# Patient Record
Sex: FEMALE | Race: WHITE | Employment: OTHER | URBAN - METROPOLITAN AREA
[De-identification: names, ages, dates, MRNs, and addresses within clinical notes are randomized per-mention and may not be internally consistent; named-entity substitution may affect disease eponyms.]

---

## 2017-01-03 ENCOUNTER — OFFICE VISIT (OUTPATIENT)
Dept: SURGERY | Facility: CLINIC | Age: 77
End: 2017-01-03

## 2017-01-03 VITALS — TEMPERATURE: 99 F

## 2017-01-03 DIAGNOSIS — K64.2 PROLAPSED INTERNAL HEMORRHOIDS, GRADE 3: Primary | ICD-10-CM

## 2017-01-03 DIAGNOSIS — N81.6 RECTOCELE: ICD-10-CM

## 2017-01-03 PROCEDURE — 46221 LIGATION OF HEMORRHOID(S): CPT | Performed by: COLON & RECTAL SURGERY

## 2017-01-03 NOTE — PROGRESS NOTES
Follow Up Visit Note       Active Problems      1. Prolapsed internal hemorrhoids, grade 3    2.  Rectocele          Chief Complaint   Hemorrhoid Banding    History of Present Illness        Allergies  Minerva is allergic to allergy; contrast dye; doxycyclin history and social history have been reviewed by me today.     Family History   Problem Relation Age of Onset   • Heart Attack Sister      AMI   • Other [Other] [OTHER] Mother      atherosclerosis   • Cancer Father      bladder   • Cancer Brother      bladd Psychiatric/Behavioral: Negative.          Physical Findings   Temp(Src) 99.1 °F (37.3 °C)  LMP 01/01/1992  Physical Exam  Assessment    Assessment   Prolapsed internal hemorrhoids, grade 3  (primary encounter diagnosis)  Rectocele    Plan   At today's of

## 2017-01-03 NOTE — PATIENT INSTRUCTIONS
At today's office visit we treated a left lateral internal hemorrhoid. At today's visit, the patient underwent an uncomplicated application of a rubber band and injection of a 5% phenol solution into the base of the rubberbanded hemorrhoid.     The patie

## 2017-01-03 NOTE — PROCEDURES
Pre op diagnosis: Internal Hemorrhoids    Post op diagnosis: Same    Procedure: Anoscopy with O-ring Rubber Band Ligation of Internal Hemorrhoids    Surgeon: Warrick Dancer, MD    History of present illness:    This patient has internal hemorrhoids that are s

## 2017-01-05 ENCOUNTER — HOSPITAL ENCOUNTER (OUTPATIENT)
Dept: MAMMOGRAPHY | Facility: HOSPITAL | Age: 77
Discharge: HOME | End: 2017-01-05
Attending: FAMILY MEDICINE
Payer: MEDICARE

## 2017-01-05 DIAGNOSIS — Z12.31 ENCOUNTER FOR SCREENING MAMMOGRAM FOR BREAST CANCER: ICD-10-CM

## 2017-01-05 PROCEDURE — 77067 SCR MAMMO BI INCL CAD: CPT

## 2017-01-23 ENCOUNTER — OFFICE VISIT (OUTPATIENT)
Dept: SURGERY | Facility: CLINIC | Age: 77
End: 2017-01-23

## 2017-01-23 VITALS
BODY MASS INDEX: 23.55 KG/M2 | SYSTOLIC BLOOD PRESSURE: 164 MMHG | TEMPERATURE: 98 F | HEIGHT: 62 IN | DIASTOLIC BLOOD PRESSURE: 82 MMHG | HEART RATE: 75 BPM | WEIGHT: 128 LBS | RESPIRATION RATE: 16 BRPM

## 2017-01-23 DIAGNOSIS — K64.2 PROLAPSED INTERNAL HEMORRHOIDS, GRADE 3: Primary | ICD-10-CM

## 2017-01-23 PROCEDURE — 46221 LIGATION OF HEMORRHOID(S): CPT | Performed by: COLON & RECTAL SURGERY

## 2017-01-23 NOTE — PROGRESS NOTES
Follow Up Visit Note       Active Problems      1.  Prolapsed internal hemorrhoids, grade 3          Chief Complaint   Hemorrhoid Banding    History of Present Illness        Allergies  Minerva is allergic to allergy; contrast dye; doxycycline; lansoprazole; social history have been reviewed by me today.     Family History   Problem Relation Age of Onset   • Heart Attack Sister      AMI   • Other [Other] [OTHER] Mother      atherosclerosis   • Cancer Father      bladder   • Cancer Brother      bladder   • Heart swallowing. Respiratory: Negative for apnea, cough, shortness of breath and wheezing. Cardiovascular: Negative for chest pain, palpitations and leg swelling.    Gastrointestinal: Negative for nausea, vomiting, abdominal pain, diarrhea, constipation, b

## 2017-01-24 NOTE — PATIENT INSTRUCTIONS
At today's office visit we treated a right posterior lateral internal hemorrhoid. At today's visit, the patient underwent an uncomplicated application of a rubber band and injection of a 5% phenol solution into the base of the rubberbanded hemorrhoid.

## 2017-01-24 NOTE — PROCEDURES
Pre op diagnosis: Internal Hemorrhoids    Post op diagnosis: Same    Procedure: Anoscopy with O-ring Rubber Band Ligation of Internal Hemorrhoids    Surgeon: Ml Pozo MD    History of present illness:    This patient has internal hemorrhoids that are s

## 2017-02-21 ENCOUNTER — OFFICE VISIT (OUTPATIENT)
Dept: SURGERY | Facility: CLINIC | Age: 77
End: 2017-02-21

## 2017-02-21 VITALS
HEART RATE: 87 BPM | SYSTOLIC BLOOD PRESSURE: 124 MMHG | DIASTOLIC BLOOD PRESSURE: 84 MMHG | HEIGHT: 62 IN | WEIGHT: 128 LBS | TEMPERATURE: 98 F | BODY MASS INDEX: 23.55 KG/M2

## 2017-02-21 DIAGNOSIS — K64.2 PROLAPSED INTERNAL HEMORRHOIDS, GRADE 3: Primary | ICD-10-CM

## 2017-02-21 PROCEDURE — 46221 LIGATION OF HEMORRHOID(S): CPT | Performed by: COLON & RECTAL SURGERY

## 2017-02-21 RX ORDER — CLINDAMYCIN HYDROCHLORIDE 150 MG/1
150 CAPSULE ORAL ONCE AS NEEDED
Refills: 0 | COMMUNITY
Start: 2016-12-20 | End: 2017-09-07 | Stop reason: ALTCHOICE

## 2017-02-21 NOTE — PROGRESS NOTES
Follow Up Visit Note       Active Problems      1. Prolapsed internal hemorrhoids, grade 3          Chief Complaint   Patient presents with:  Hemorrhoid Banding: pt here for possible 4th hemorrhoid banding tx.  Some soreness when having a BM, some bleeding adenoidectomy    THYROIDECTOMY      Comment partial    GASTRODUODENOSTOMY  11/22/2013    Comment +hiatal hernia    JACKIE NEEDLE LOCALIZATION W/ SPECIMEN 1 SITE LEFT  1985    JACKIE NEEDLE LOCALIZATION W/ SPECIMEN 1 SITE LEFT  1990       The family history and s has been reviewed by me during today. Review of Systems   Constitutional: Negative for fever, chills, diaphoresis, fatigue and unexpected weight change. HENT: Negative for hearing loss, nosebleeds, sore throat and trouble swallowing.     Respiratory: Neg her hemorrhoidal tissues. I have no further follow-up scheduled with this patient at this time. This patient can see me on an as-needed basis. This patient should return urgently for any problems or complications related to my surgical intervention.

## 2017-02-21 NOTE — PROCEDURES
Pre op diagnosis: Internal Hemorrhoids    Post op diagnosis: Same    Procedure: Anoscopy with O-ring Rubber Band Ligation of Internal Hemorrhoids    Surgeon: Dave Echols MD    History of present illness:    This patient has internal hemorrhoids that are s

## 2017-05-11 RX ORDER — FAMOTIDINE 40 MG/1
TABLET, FILM COATED ORAL
Qty: 60 TABLET | Refills: 0 | Status: SHIPPED | OUTPATIENT
Start: 2017-05-11 | End: 2017-07-16

## 2017-05-15 ENCOUNTER — PRIOR ORIGINAL RECORDS (OUTPATIENT)
Dept: OTHER | Age: 77
End: 2017-05-15

## 2017-05-15 ENCOUNTER — LAB ENCOUNTER (OUTPATIENT)
Dept: LAB | Facility: HOSPITAL | Age: 77
End: 2017-05-15
Attending: OTOLARYNGOLOGY
Payer: MEDICARE

## 2017-05-15 DIAGNOSIS — E04.1 NONTOXIC SINGLE THYROID NODULE: Primary | ICD-10-CM

## 2017-05-15 PROCEDURE — 84443 ASSAY THYROID STIM HORMONE: CPT

## 2017-05-15 PROCEDURE — 36415 COLL VENOUS BLD VENIPUNCTURE: CPT

## 2017-05-15 PROCEDURE — 82607 VITAMIN B-12: CPT

## 2017-05-15 PROCEDURE — 84439 ASSAY OF FREE THYROXINE: CPT

## 2017-05-16 ENCOUNTER — LAB ENCOUNTER (OUTPATIENT)
Dept: LAB | Facility: HOSPITAL | Age: 77
End: 2017-05-16
Attending: INTERNAL MEDICINE
Payer: MEDICARE

## 2017-05-16 ENCOUNTER — OFFICE VISIT (OUTPATIENT)
Dept: FAMILY MEDICINE CLINIC | Facility: CLINIC | Age: 77
End: 2017-05-16

## 2017-05-16 VITALS
BODY MASS INDEX: 24.11 KG/M2 | HEIGHT: 62 IN | SYSTOLIC BLOOD PRESSURE: 130 MMHG | WEIGHT: 131 LBS | DIASTOLIC BLOOD PRESSURE: 60 MMHG | RESPIRATION RATE: 14 BRPM | HEART RATE: 78 BPM

## 2017-05-16 DIAGNOSIS — K64.2 PROLAPSED INTERNAL HEMORRHOIDS, GRADE 3: ICD-10-CM

## 2017-05-16 DIAGNOSIS — J30.9 ALLERGIC RHINITIS, UNSPECIFIED ALLERGIC RHINITIS TRIGGER, UNSPECIFIED RHINITIS SEASONALITY: ICD-10-CM

## 2017-05-16 DIAGNOSIS — Z00.00 ENCOUNTER FOR ANNUAL HEALTH EXAMINATION: Primary | ICD-10-CM

## 2017-05-16 DIAGNOSIS — E78.00 PURE HYPERCHOLESTEROLEMIA: ICD-10-CM

## 2017-05-16 DIAGNOSIS — K21.9 GASTROESOPHAGEAL REFLUX DISEASE, ESOPHAGITIS PRESENCE NOT SPECIFIED: ICD-10-CM

## 2017-05-16 DIAGNOSIS — E04.1 NONTOXIC SINGLE THYROID NODULE: Primary | ICD-10-CM

## 2017-05-16 DIAGNOSIS — K57.30 DIVERTICULOSIS OF LARGE INTESTINE WITHOUT HEMORRHAGE: ICD-10-CM

## 2017-05-16 DIAGNOSIS — N81.6 RECTOCELE: ICD-10-CM

## 2017-05-16 DIAGNOSIS — Z12.11 SCREENING FOR COLON CANCER: ICD-10-CM

## 2017-05-16 DIAGNOSIS — E78.5 DYSLIPIDEMIA: ICD-10-CM

## 2017-05-16 DIAGNOSIS — E03.9 ACQUIRED HYPOTHYROIDISM: ICD-10-CM

## 2017-05-16 DIAGNOSIS — E55.9 VITAMIN D DEFICIENCY: ICD-10-CM

## 2017-05-16 DIAGNOSIS — G47.33 OSA (OBSTRUCTIVE SLEEP APNEA): ICD-10-CM

## 2017-05-16 DIAGNOSIS — H26.9 CATARACT OF BOTH EYES, UNSPECIFIED CATARACT TYPE: ICD-10-CM

## 2017-05-16 DIAGNOSIS — Z78.0 MENOPAUSE: ICD-10-CM

## 2017-05-16 DIAGNOSIS — R31.9 HEMATURIA, UNSPECIFIED: ICD-10-CM

## 2017-05-16 DIAGNOSIS — I10 ESSENTIAL HYPERTENSION: ICD-10-CM

## 2017-05-16 DIAGNOSIS — L30.9 ECZEMA, UNSPECIFIED TYPE: ICD-10-CM

## 2017-05-16 DIAGNOSIS — K44.9 HIATAL HERNIA: ICD-10-CM

## 2017-05-16 DIAGNOSIS — Z13.31 DEPRESSION SCREENING: ICD-10-CM

## 2017-05-16 DIAGNOSIS — F40.01 AGORAPHOBIA WITH PANIC DISORDER: ICD-10-CM

## 2017-05-16 DIAGNOSIS — M81.6 LOCALIZED OSTEOPOROSIS, UNSPECIFIED PATHOLOGICAL FRACTURE PRESENCE: ICD-10-CM

## 2017-05-16 PROCEDURE — G0439 PPPS, SUBSEQ VISIT: HCPCS | Performed by: FAMILY MEDICINE

## 2017-05-16 PROCEDURE — G0444 DEPRESSION SCREEN ANNUAL: HCPCS | Performed by: FAMILY MEDICINE

## 2017-05-16 RX ORDER — DIPHENHYDRAMINE HYDROCHLORIDE 25 MG/1
TABLET ORAL DAILY
COMMUNITY
End: 2019-05-07 | Stop reason: ALTCHOICE

## 2017-05-16 RX ORDER — AZELASTINE HCL 205.5 UG/1
SPRAY NASAL
COMMUNITY
End: 2018-02-05

## 2017-05-16 NOTE — PROGRESS NOTES
HPI:   Emilio Carrizales is a 78year old female who presents for a Medicare Subsequent Annual Wellness visit (Pt already had Initial Annual Wellness). Pt. Complains of fatigue and falling asleep at the wheel. Hx of MINGO.     Her last annual assessment h 5/16/17 encounter (Office Visit) with Elaina Gomez DO:  Azelastine HCl 0.15 % Nasal Solution by Nasal route. Biotin (BIOTIN 5000) 5 MG Oral Cap Take by mouth.    FAMOTIDINE 40 MG Oral Tab TAKE ONE TABLET BY MOUTH TWO TIMES A DAY   Multiple Vitamins-Mine SYSTEMS:   GENERAL: feels well otherwise  SKIN: denies any unusual skin lesions  EYES: denies blurred vision or double vision  HEENT: denies nasal congestion, sinus pain or ST  LUNGS: denies shortness of breath with exertion  CARDIOVASCULAR: denies chest p SUMMARY:   Diagnoses and all orders for this visit:    Encounter for annual health examination    Essential hypertension  -     Comp Metabolic Panel (14) [E]; Future    Dyslipidemia  -     Lipid Panel [E];  Future    Localized osteoporosis, unspecified path a good energy level?: Appropriate Exercise;Daily Walks    How would you describe your daily physical activity?: Moderate    How would you describe your current health state?: Good    How do you maintain positive mental well-being?: Social Interaction; Visit No     Please go to \"Cognitive Assessment\" under Medicare Assessment section in Charting, test patient and document. Then, refresh your progress note to see your input here.   Cognitive Assessment     What day of the week is this?: Correct    What nereida for: CHLAMYDIA No flowsheet data found.     Screening Mammogram      Mammogram Annually to 78, then as discussed Mammogram,1 Yr due on 01/05/2018 Update Health Maintenance if applicable     Immunizations (Update Immunization Activity if applicable)     Infl Creat/alb ratio  Annually      LDL  Annually LDL CHOLESTEROL (mg/dL)   Date Value   12/02/2016 88     LDL CHOLESTROL (mg/dL)   Date Value   09/06/2012 142*    No flowsheet data found. Dilated Eye exam  Annually No flowsheet data found.   No flowsheet da

## 2017-05-16 NOTE — PATIENT INSTRUCTIONS
Minerva Hameed's SCREENING SCHEDULE   Tests on this list are recommended by your physician but may not be covered, or covered at this frequency, by your insurer. Please check with your insurance carrier before scheduling to verify coverage.    PREVENTATI lifetime   • Anyone with a family history    Colorectal Cancer Screening  Covered up to Age 76     Colonoscopy Screen   Covered every 10 years- more often if abnormal Colonoscopy,10 Years due on 05/02/2017 Update Health Maintenance if applicable    Flex Si or performed in visit on 10/27/15  -FLU VACC [address] ANTIG   Orders placed or performed in visit on 10/24/14  -INFLUENZA VIRUS VACCINE, >=1YEARS OF AGE   Orders placed or performed in visit on 10/11/13  -INFLUENZA VIRUS VACCINE, PRESERV FREE, >=3 YE Directives. It also has the State forms available on it's website for anyone to review and print using their home computer and printer. (the forms are also available in [address])  [email or web address]. [email or web address] org  This link also has information from the [employer]

## 2017-05-25 ENCOUNTER — APPOINTMENT (OUTPATIENT)
Dept: LAB | Facility: HOSPITAL | Age: 77
End: 2017-05-25
Attending: FAMILY MEDICINE
Payer: MEDICARE

## 2017-05-25 ENCOUNTER — PRIOR ORIGINAL RECORDS (OUTPATIENT)
Dept: OTHER | Age: 77
End: 2017-05-25

## 2017-05-25 DIAGNOSIS — I10 ESSENTIAL HYPERTENSION: ICD-10-CM

## 2017-05-25 DIAGNOSIS — E78.5 DYSLIPIDEMIA: ICD-10-CM

## 2017-05-25 PROCEDURE — 80053 COMPREHEN METABOLIC PANEL: CPT

## 2017-05-25 PROCEDURE — 80061 LIPID PANEL: CPT

## 2017-05-25 PROCEDURE — 36415 COLL VENOUS BLD VENIPUNCTURE: CPT

## 2017-05-30 ENCOUNTER — OFFICE VISIT (OUTPATIENT)
Dept: FAMILY MEDICINE CLINIC | Facility: CLINIC | Age: 77
End: 2017-05-30

## 2017-05-30 ENCOUNTER — LAB ENCOUNTER (OUTPATIENT)
Dept: LAB | Age: 77
End: 2017-05-30
Attending: FAMILY MEDICINE
Payer: MEDICARE

## 2017-05-30 VITALS
HEART RATE: 88 BPM | HEIGHT: 62 IN | WEIGHT: 129 LBS | DIASTOLIC BLOOD PRESSURE: 60 MMHG | BODY MASS INDEX: 23.74 KG/M2 | TEMPERATURE: 103 F | SYSTOLIC BLOOD PRESSURE: 120 MMHG | RESPIRATION RATE: 14 BRPM

## 2017-05-30 DIAGNOSIS — R50.9 FEVER, UNSPECIFIED FEVER CAUSE: ICD-10-CM

## 2017-05-30 DIAGNOSIS — N12 PYELONEPHRITIS: Primary | ICD-10-CM

## 2017-05-30 DIAGNOSIS — N12 PYELONEPHRITIS: ICD-10-CM

## 2017-05-30 PROCEDURE — 87086 URINE CULTURE/COLONY COUNT: CPT

## 2017-05-30 PROCEDURE — 80053 COMPREHEN METABOLIC PANEL: CPT

## 2017-05-30 PROCEDURE — 87186 SC STD MICRODIL/AGAR DIL: CPT

## 2017-05-30 PROCEDURE — 87088 URINE BACTERIA CULTURE: CPT

## 2017-05-30 PROCEDURE — 99214 OFFICE O/P EST MOD 30 MIN: CPT | Performed by: FAMILY MEDICINE

## 2017-05-30 PROCEDURE — 81003 URINALYSIS AUTO W/O SCOPE: CPT | Performed by: FAMILY MEDICINE

## 2017-05-30 PROCEDURE — 85025 COMPLETE CBC W/AUTO DIFF WBC: CPT

## 2017-05-30 PROCEDURE — 36415 COLL VENOUS BLD VENIPUNCTURE: CPT

## 2017-05-30 RX ORDER — CIPROFLOXACIN 500 MG/1
500 TABLET, FILM COATED ORAL 2 TIMES DAILY
Qty: 20 TABLET | Refills: 0 | Status: SHIPPED | OUTPATIENT
Start: 2017-05-30 | End: 2017-06-12 | Stop reason: ALTCHOICE

## 2017-05-30 NOTE — PROGRESS NOTES
Patrick Ocampo is a 78year old female. HPI:   Patient states she has not been feeling well for the past 2-3 weeks. She initially noticed that when she would go to the bathroom she would notice a pressure down below.   She just checked it up to discomfor Face Flushing  Pcn [Penicillins]       Rash  Peanuts                     Comment:vomiting  Pravastatin             Myalgia  Simvastatin             Myalgia  Sulfa Antibiotics       Rash  Sulfa Drugs Cross R*    Rash  Valium                      Comment: Ta breath with exertion  CARDIOVASCULAR: denies chest pain on exertion  GI: denies abdominal pain,denies heartburn  : Discomfort while urinating and urge to urinate without any urine coming out  MUSCULOSKELETAL: denies back pain  EXTREMITIES:  No pain or nu

## 2017-06-01 ENCOUNTER — TELEPHONE (OUTPATIENT)
Dept: FAMILY MEDICINE CLINIC | Facility: CLINIC | Age: 77
End: 2017-06-01

## 2017-06-01 DIAGNOSIS — N10 ACUTE PYELONEPHRITIS: Primary | ICD-10-CM

## 2017-06-12 PROBLEM — G47.30 SLEEP-DISORDERED BREATHING: Status: ACTIVE | Noted: 2017-06-12

## 2017-06-13 ENCOUNTER — APPOINTMENT (OUTPATIENT)
Dept: LAB | Facility: HOSPITAL | Age: 77
End: 2017-06-13
Attending: FAMILY MEDICINE
Payer: MEDICARE

## 2017-06-13 DIAGNOSIS — N10 ACUTE PYELONEPHRITIS: ICD-10-CM

## 2017-06-13 PROCEDURE — 87086 URINE CULTURE/COLONY COUNT: CPT

## 2017-06-14 ENCOUNTER — TELEPHONE (OUTPATIENT)
Dept: FAMILY MEDICINE CLINIC | Facility: CLINIC | Age: 77
End: 2017-06-14

## 2017-06-14 NOTE — TELEPHONE ENCOUNTER
Call to pt reaches voice mail. Left vmm advising of dr Saul Noonan recommendation. Informed as soon as culture results received we will call back with dr's further recommendations.

## 2017-06-14 NOTE — TELEPHONE ENCOUNTER
Pt was seen on 5/30/17 for UTI. States she just finished Cipro on 6/9/17. States on Tuesday 6/13/17 she began \"burning and lower left back pain\". Said she had a urinalysis performed at THE [facility] ER on 6/13/17. Did not say results.   Is asking for Nephrolo

## 2017-06-14 NOTE — TELEPHONE ENCOUNTER
Per chart her cx is still pending, has had her sx's intermittent past few weeks. She is asking for nephrology referral.    Should we wait until final cx done? Any other recommendation? Imaging? Please advise. Thanks.

## 2017-06-16 DIAGNOSIS — M54.5 LEFT LOW BACK PAIN, UNSPECIFIED CHRONICITY, WITH SCIATICA PRESENCE UNSPECIFIED: ICD-10-CM

## 2017-06-16 DIAGNOSIS — R30.0 BURNING WITH URINATION: ICD-10-CM

## 2017-06-16 DIAGNOSIS — R31.9 HEMATURIA, UNSPECIFIED: Primary | ICD-10-CM

## 2017-06-30 ENCOUNTER — TELEPHONE (OUTPATIENT)
Dept: FAMILY MEDICINE CLINIC | Facility: CLINIC | Age: 77
End: 2017-06-30

## 2017-06-30 NOTE — TELEPHONE ENCOUNTER
Pt had a temp of 96 one day last week and today. Pt is a little concerned b/c she has kidney disease and is wondering if this is a side effect of the med she is taking for her kidney.

## 2017-07-03 NOTE — TELEPHONE ENCOUNTER
T.C. To pt. She does not have Kidney Disease. She had a UTI. Pt is feeling fine and her temperature has now been 97.0. I advised her to please make sure she calls back if she has any re-occurence of symptoms. Pt verbalized understanding.

## 2017-07-17 RX ORDER — FAMOTIDINE 40 MG/1
TABLET, FILM COATED ORAL
Qty: 60 TABLET | Refills: 0 | Status: SHIPPED | OUTPATIENT
Start: 2017-07-17 | End: 2017-09-15

## 2017-07-26 ENCOUNTER — OFFICE VISIT (OUTPATIENT)
Dept: FAMILY MEDICINE CLINIC | Facility: CLINIC | Age: 77
End: 2017-07-26

## 2017-07-26 VITALS
WEIGHT: 129 LBS | OXYGEN SATURATION: 98 % | BODY MASS INDEX: 23.74 KG/M2 | RESPIRATION RATE: 14 BRPM | HEIGHT: 62 IN | TEMPERATURE: 99 F | DIASTOLIC BLOOD PRESSURE: 62 MMHG | HEART RATE: 80 BPM | SYSTOLIC BLOOD PRESSURE: 120 MMHG

## 2017-07-26 DIAGNOSIS — M79.89 TOE SWELLING: ICD-10-CM

## 2017-07-26 DIAGNOSIS — T14.8XXA BLISTER: ICD-10-CM

## 2017-07-26 DIAGNOSIS — T63.441A BEE STING, ACCIDENTAL OR UNINTENTIONAL, INITIAL ENCOUNTER: Primary | ICD-10-CM

## 2017-07-26 PROCEDURE — 99214 OFFICE O/P EST MOD 30 MIN: CPT | Performed by: FAMILY MEDICINE

## 2017-07-26 PROCEDURE — 81015 MICROSCOPIC EXAM OF URINE: CPT | Performed by: UROLOGY

## 2017-07-26 NOTE — PROGRESS NOTES
Charles Villatoro is a 78year old female. HPI:   Patient states that last Wednesday she got stung on the lateral aspect of her right great toe. She was also wearing a shoe that is probably irritating the area.   She had noted over time that a blister forme Dysfunction of eustachian tube    • Dyspepsia and other specified disorders of function of stomach    • Dysphagia, unspecified(787.20)    • Heartburn    • MVP (mitral valve prolapse)    • Obstructive sleep apnea (adult) (pediatric)    • Personal history of encounter diagnosis)  Blister  Toe swelling    No orders of the defined types were placed in this encounter. Meds & Refills for this Visit:  No prescriptions requested or ordered in this encounter    Imaging & Consults:  None   Monitor.   Neosporin  No

## 2017-08-08 ENCOUNTER — OFFICE VISIT (OUTPATIENT)
Dept: FAMILY MEDICINE CLINIC | Facility: CLINIC | Age: 77
End: 2017-08-08

## 2017-08-08 VITALS
BODY MASS INDEX: 24 KG/M2 | SYSTOLIC BLOOD PRESSURE: 138 MMHG | RESPIRATION RATE: 14 BRPM | DIASTOLIC BLOOD PRESSURE: 70 MMHG | WEIGHT: 130 LBS | HEART RATE: 68 BPM | TEMPERATURE: 98 F

## 2017-08-08 DIAGNOSIS — R10.2 PELVIC PRESSURE IN FEMALE: ICD-10-CM

## 2017-08-08 DIAGNOSIS — L30.9 DERMATITIS: Primary | ICD-10-CM

## 2017-08-08 LAB
BILIRUB UR QL STRIP.AUTO: NEGATIVE
CLARITY UR REFRACT.AUTO: CLEAR
COLOR UR AUTO: YELLOW
GLUCOSE UR STRIP.AUTO-MCNC: NEGATIVE MG/DL
KETONES UR STRIP.AUTO-MCNC: NEGATIVE MG/DL
LEUKOCYTE ESTERASE UR QL STRIP.AUTO: NEGATIVE
NITRITE UR QL STRIP.AUTO: NEGATIVE
PH UR STRIP.AUTO: 5 [PH] (ref 4.5–8)
PROT UR STRIP.AUTO-MCNC: NEGATIVE MG/DL
RBC UR QL AUTO: NEGATIVE
SP GR UR STRIP.AUTO: 1.01 (ref 1–1.03)
UROBILINOGEN UR STRIP.AUTO-MCNC: <2 MG/DL

## 2017-08-08 PROCEDURE — 81003 URINALYSIS AUTO W/O SCOPE: CPT | Performed by: FAMILY MEDICINE

## 2017-08-08 PROCEDURE — 99213 OFFICE O/P EST LOW 20 MIN: CPT | Performed by: FAMILY MEDICINE

## 2017-08-08 NOTE — PROGRESS NOTES
Murali Saini is a 78year old female. HPI:   Patient states she had noticed her bee sting bite about 3 weeks ago. She used some Benadryl gel on her leg for the bee sting was. And she decided to take some old Benadryl from 2013.   She took it the day o Niacin                  Face Flushing  Pcn [Penicillins]       Rash  Peanuts                     Comment:vomiting  Pravastatin             Myalgia  Simvastatin             Myalgia  Sulfa Antibiotics       Rash  Sulfa Drugs Cross R*    Rash  Moncho Cells None Seen Small /LPF   Transitional Cells None Seen Small /LPF   Yeast Urine None Seen None Seen   Mucous Urine 1+ (A) None Seen       REVIEW OF SYSTEMS:   GENERAL: feels well otherwise  SKIN: denies any unusual skin lesions  LUNGS: denies shortness

## 2017-08-17 ENCOUNTER — PRIOR ORIGINAL RECORDS (OUTPATIENT)
Dept: OTHER | Age: 77
End: 2017-08-17

## 2017-08-17 LAB
CHOLESTEROL, TOTAL: 180 MG/DL
FREE T4: 1 MG/DL
HDL CHOLESTEROL: 61 MG/DL
LDL CHOLESTEROL: 91 MG/DL
NON-HDL CHOLESTEROL: 119 MG/DL
THYROID STIMULATING HORMONE: 2.84 MLU/L
TRIGLYCERIDES: 139 MG/DL

## 2017-09-05 ENCOUNTER — PRIOR ORIGINAL RECORDS (OUTPATIENT)
Dept: OTHER | Age: 77
End: 2017-09-05

## 2017-09-07 ENCOUNTER — HOSPITAL ENCOUNTER (OUTPATIENT)
Age: 77
Discharge: HOME | End: 2017-09-07
Attending: FAMILY MEDICINE
Payer: MEDICARE

## 2017-09-07 ENCOUNTER — TELEPHONE (OUTPATIENT)
Dept: FAMILY MEDICINE CLINIC | Facility: CLINIC | Age: 77
End: 2017-09-07

## 2017-09-07 VITALS
HEART RATE: 73 BPM | OXYGEN SATURATION: 100 % | DIASTOLIC BLOOD PRESSURE: 88 MMHG | SYSTOLIC BLOOD PRESSURE: 161 MMHG | RESPIRATION RATE: 18 BRPM | BODY MASS INDEX: 24 KG/M2 | TEMPERATURE: 98 F | WEIGHT: 130.06 LBS

## 2017-09-07 DIAGNOSIS — R30.0 DYSURIA: Primary | ICD-10-CM

## 2017-09-07 LAB
POCT BILIRUBIN URINE: NEGATIVE
POCT GLUCOSE URINE: NEGATIVE MG/DL
POCT KETONE URINE: NEGATIVE MG/DL
POCT LEUKOCYTE ESTERASE URINE: NEGATIVE
POCT NITRITE URINE: NEGATIVE
POCT PH URINE: 6.5 (ref 5–8)
POCT PROTEIN URINE: NEGATIVE MG/DL
POCT SPECIFIC GRAVITY URINE: 1.02
POCT URINE CLARITY: CLEAR
POCT URINE COLOR: YELLOW
POCT UROBILINOGEN URINE: 0.2 MG/DL

## 2017-09-07 PROCEDURE — 99212 OFFICE O/P EST SF 10 MIN: CPT

## 2017-09-07 PROCEDURE — 99202 OFFICE O/P NEW SF 15 MIN: CPT

## 2017-09-07 PROCEDURE — 81002 URINALYSIS NONAUTO W/O SCOPE: CPT | Performed by: FAMILY MEDICINE

## 2017-09-07 NOTE — ED PROVIDER NOTES
Patient Seen in: [address]    History   Patient presents with:  Urinary Symptoms (urologic)    Stated Complaint: possible UTI    The history is provided by the patient. Patient is a 30-year-old female.   Coming in [facility] TONSILLECTOMY      Comment: w/ adenoidectomy  3/29/2010: UPPER GI ENDOSCOPY,EXAM      Comment: A. STOMACH BX:  GASTRIC ANTRAL MUCOSA, NO PATH               CHANGES   B.  ESOPHAGEAL BX  BENIGN STRATIFIED                SQUAMOUS MUCOSA, NO PATH CHANGES.   NO °C)  Temp src: Temporal  SpO2: 100 %  O2 Device: None (Room air)    Current:BP (!) 161/88   Pulse 73   Temp 98.4 °F (36.9 °C) (Temporal)   Resp 18   Wt 59 kg   LMP 01/01/1992   SpO2 100%   BMI 23.79 kg/m²         Physical Exam   Constitutional: She is orie

## 2017-09-07 NOTE — TELEPHONE ENCOUNTER
Pt states she is having UTI symptoms:  Frequency, leaking before she reaches bathroom, left side towards back is sore occasionally. Symptoms for three days. Pt is asking if an order for a urine test can be placed.   She would like to go to [facility]

## 2017-09-07 NOTE — TELEPHONE ENCOUNTER
Pt has had hx of UTI. I have advised her to go to urgent care now. She is on her way home from a sleep study. I have asked her to stop at [facility]. pt agrees to do this. Just FYI

## 2017-09-13 ENCOUNTER — PRIOR ORIGINAL RECORDS (OUTPATIENT)
Dept: OTHER | Age: 77
End: 2017-09-13

## 2017-09-14 ENCOUNTER — OFFICE VISIT (OUTPATIENT)
Dept: SLEEP CENTER | Facility: HOSPITAL | Age: 77
End: 2017-09-14
Attending: INTERNAL MEDICINE
Payer: MEDICARE

## 2017-09-14 PROCEDURE — 95810 POLYSOM 6/> YRS 4/> PARAM: CPT

## 2017-09-18 RX ORDER — FAMOTIDINE 40 MG/1
TABLET, FILM COATED ORAL
Qty: 60 TABLET | Refills: 0 | Status: SHIPPED | OUTPATIENT
Start: 2017-09-18 | End: 2017-11-15

## 2017-09-22 NOTE — PROCEDURES
[address] [address],[facility]       Accredited by the [place] [employer] ([employer]ASM)    PATIENT'S NAME:        Mone Wood  ATTENDING PHYSICIAN:   Christine Pulido M.D.   REFERRING PHYSICIAN:   Christine Pulido M.D.  PA was 308 minutes for a sleep efficiency of 83.7% which is slightly decreased as compared to expected norms. Sleep onset latency was decreased at 5.4 minutes. Wake after sleep onset was slightly increased at 55.5 minutes.   Sleep was fragmented by arousals bedtime. The patient should avoid driving while sleepy.       Dictated By Anna Griffin M.D.  d: 09/22/2017 16:08:25  t: 09/22/2017 16:34:50  [facility] [id number]  AK/

## 2017-10-04 PROCEDURE — 88305 TISSUE EXAM BY PATHOLOGIST: CPT | Performed by: INTERNAL MEDICINE

## 2017-10-05 ENCOUNTER — PRIOR ORIGINAL RECORDS (OUTPATIENT)
Dept: OTHER | Age: 77
End: 2017-10-05

## 2017-10-19 ENCOUNTER — OFFICE VISIT (OUTPATIENT)
Dept: FAMILY MEDICINE CLINIC | Facility: CLINIC | Age: 77
End: 2017-10-19

## 2017-10-19 VITALS
SYSTOLIC BLOOD PRESSURE: 102 MMHG | RESPIRATION RATE: 14 BRPM | WEIGHT: 126 LBS | DIASTOLIC BLOOD PRESSURE: 60 MMHG | BODY MASS INDEX: 23 KG/M2 | HEART RATE: 80 BPM | TEMPERATURE: 98 F

## 2017-10-19 DIAGNOSIS — L03.114 CELLULITIS OF LEFT UPPER EXTREMITY: Primary | ICD-10-CM

## 2017-10-19 PROCEDURE — 99213 OFFICE O/P EST LOW 20 MIN: CPT | Performed by: FAMILY MEDICINE

## 2017-10-19 RX ORDER — METHYLPREDNISOLONE 4 MG/1
TABLET ORAL
Qty: 1 KIT | Refills: 0 | Status: SHIPPED | OUTPATIENT
Start: 2017-10-19 | End: 2017-11-16 | Stop reason: ALTCHOICE

## 2017-10-19 RX ORDER — AMOXICILLIN AND CLAVULANATE POTASSIUM 875; 125 MG/1; MG/1
1 TABLET, FILM COATED ORAL 2 TIMES DAILY
Qty: 20 TABLET | Refills: 0 | Status: SHIPPED | OUTPATIENT
Start: 2017-10-19 | End: 2017-10-29

## 2017-10-19 NOTE — PROGRESS NOTES
Marie Wolfe is a 78year old female. HPI:   Patient states she was working in the yard on Tuesday. She does not recall getting bitten but she did note to fainting-like marks on her left forearm after coming in from working in the garden.   Since then, Dysfunction of eustachian tube    • Dyspepsia and other specified disorders of function of stomach    • Dysphagia, unspecified(787.20)    • Heartburn    • MVP (mitral valve prolapse)    • Obstructive sleep apnea (adult) (pediatric) EDW PSG-9/14/17    AHI 4 one measures 0.2 cm, the other measures 0.3 cm in   greatest dimension. The specimen is submitted in toto in cassette A.   JW/tjf  [FORMATTING REMOVED]        REVIEW OF SYSTEMS:   GENERAL: feels well otherwise  SKIN: denies any unusual skin lesions; bite? stomach otherwise she has never had any issues with penicillins or amoxicillin in the past.  I did explain to her that usually a systemic reaction would occur from an allergy to medication not just a spot on the stomach.   She did state that Dr. Ronan Sweet had

## 2017-11-16 ENCOUNTER — OFFICE VISIT (OUTPATIENT)
Dept: FAMILY MEDICINE CLINIC | Facility: CLINIC | Age: 77
End: 2017-11-16

## 2017-11-16 VITALS
RESPIRATION RATE: 18 BRPM | BODY MASS INDEX: 24.29 KG/M2 | HEART RATE: 86 BPM | DIASTOLIC BLOOD PRESSURE: 78 MMHG | WEIGHT: 132 LBS | TEMPERATURE: 99 F | OXYGEN SATURATION: 99 % | HEIGHT: 62 IN | SYSTOLIC BLOOD PRESSURE: 118 MMHG

## 2017-11-16 DIAGNOSIS — R35.0 URINARY FREQUENCY: ICD-10-CM

## 2017-11-16 DIAGNOSIS — J31.0 RHINITIS, UNSPECIFIED CHRONICITY, UNSPECIFIED TYPE: Primary | ICD-10-CM

## 2017-11-16 PROCEDURE — 99213 OFFICE O/P EST LOW 20 MIN: CPT | Performed by: NURSE PRACTITIONER

## 2017-11-16 PROCEDURE — 87086 URINE CULTURE/COLONY COUNT: CPT | Performed by: NURSE PRACTITIONER

## 2017-11-16 PROCEDURE — 81003 URINALYSIS AUTO W/O SCOPE: CPT | Performed by: NURSE PRACTITIONER

## 2017-11-16 RX ORDER — FAMOTIDINE 40 MG/1
TABLET, FILM COATED ORAL
Qty: 60 TABLET | Refills: 0 | Status: SHIPPED | OUTPATIENT
Start: 2017-11-16 | End: 2018-01-22

## 2017-11-16 NOTE — PROGRESS NOTES
Radha Worthington is a 78year old female. HPI:   Patient presents today reporting nasal congestion, runny nose, sinus pressure and headaches x3 weeks.  She reports that her symptoms \"come and go\" and are not present every day or all day long when they are Social History:  Smoking status: Never Smoker                                                              Smokeless tobacco: Never Used                      Alcohol use: No                 REVIEW OF SYSTEMS:   GENERAL HEALTH: feels well otherwise.  Vijay Lorenz provider specified. 1. Rhinitis, unspecified chronicity, unspecified type  Discussed OTC Claritin or Allegra. Resume using Azelastine nasal spray. Monitor. 2. Urinary frequency  UA showed trace leukocytes. Negative Nitrates. Negative Blood.   Will

## 2017-11-17 ENCOUNTER — PRIOR ORIGINAL RECORDS (OUTPATIENT)
Dept: OTHER | Age: 77
End: 2017-11-17

## 2017-11-22 ENCOUNTER — PRIOR ORIGINAL RECORDS (OUTPATIENT)
Dept: OTHER | Age: 77
End: 2017-11-22

## 2017-11-28 ENCOUNTER — IMMUNIZATION (OUTPATIENT)
Dept: FAMILY MEDICINE CLINIC | Facility: CLINIC | Age: 77
End: 2017-11-28

## 2017-11-28 PROCEDURE — 90653 IIV ADJUVANT VACCINE IM: CPT | Performed by: FAMILY MEDICINE

## 2017-11-28 PROCEDURE — G0008 ADMIN INFLUENZA VIRUS VAC: HCPCS | Performed by: FAMILY MEDICINE

## 2017-12-11 ENCOUNTER — PRIOR ORIGINAL RECORDS (OUTPATIENT)
Dept: OTHER | Age: 77
End: 2017-12-11

## 2018-01-12 ENCOUNTER — PRIOR ORIGINAL RECORDS (OUTPATIENT)
Dept: OTHER | Age: 78
End: 2018-01-12

## 2018-01-16 ENCOUNTER — HOSPITAL ENCOUNTER (OUTPATIENT)
Dept: MAMMOGRAPHY | Age: 78
Discharge: HOME | End: 2018-01-16
Attending: FAMILY MEDICINE
Payer: MEDICARE

## 2018-01-16 ENCOUNTER — HOSPITAL ENCOUNTER (OUTPATIENT)
Dept: BONE DENSITY | Age: 78
Discharge: HOME | End: 2018-01-16
Attending: FAMILY MEDICINE
Payer: MEDICARE

## 2018-01-16 ENCOUNTER — TELEPHONE (OUTPATIENT)
Dept: FAMILY MEDICINE CLINIC | Facility: CLINIC | Age: 78
End: 2018-01-16

## 2018-01-16 DIAGNOSIS — M81.6 LOCALIZED OSTEOPOROSIS, UNSPECIFIED PATHOLOGICAL FRACTURE PRESENCE: ICD-10-CM

## 2018-01-16 DIAGNOSIS — Z12.31 ENCOUNTER FOR SCREENING MAMMOGRAM FOR MALIGNANT NEOPLASM OF BREAST: ICD-10-CM

## 2018-01-16 PROCEDURE — 77067 SCR MAMMO BI INCL CAD: CPT | Performed by: FAMILY MEDICINE

## 2018-01-16 PROCEDURE — 77080 DXA BONE DENSITY AXIAL: CPT | Performed by: FAMILY MEDICINE

## 2018-01-16 NOTE — TELEPHONE ENCOUNTER
Call to pt-advised of lynn's request for appt to discuss dexa scan results. Pt sts just received call from our ofc and scheduled appt with lynn for tomorrow-1/17/18 1130 am to discuss results.   Patient voices understanding/agrees with plan/no furth

## 2018-01-17 ENCOUNTER — OFFICE VISIT (OUTPATIENT)
Dept: FAMILY MEDICINE CLINIC | Facility: CLINIC | Age: 78
End: 2018-01-17

## 2018-01-17 ENCOUNTER — APPOINTMENT (OUTPATIENT)
Dept: LAB | Age: 78
End: 2018-01-17
Attending: NURSE PRACTITIONER
Payer: MEDICARE

## 2018-01-17 ENCOUNTER — PRIOR ORIGINAL RECORDS (OUTPATIENT)
Dept: OTHER | Age: 78
End: 2018-01-17

## 2018-01-17 VITALS
RESPIRATION RATE: 16 BRPM | HEIGHT: 62 IN | SYSTOLIC BLOOD PRESSURE: 124 MMHG | BODY MASS INDEX: 24.48 KG/M2 | DIASTOLIC BLOOD PRESSURE: 80 MMHG | HEART RATE: 74 BPM | TEMPERATURE: 98 F | WEIGHT: 133 LBS

## 2018-01-17 DIAGNOSIS — R35.0 URINARY FREQUENCY: ICD-10-CM

## 2018-01-17 DIAGNOSIS — Z86.39 HISTORY OF VITAMIN D DEFICIENCY: ICD-10-CM

## 2018-01-17 DIAGNOSIS — M81.0 OSTEOPOROSIS WITHOUT CURRENT PATHOLOGICAL FRACTURE, UNSPECIFIED OSTEOPOROSIS TYPE: ICD-10-CM

## 2018-01-17 DIAGNOSIS — M81.0 OSTEOPOROSIS WITHOUT CURRENT PATHOLOGICAL FRACTURE, UNSPECIFIED OSTEOPOROSIS TYPE: Primary | ICD-10-CM

## 2018-01-17 LAB
BILIRUB UR QL STRIP.AUTO: NEGATIVE
CLARITY UR REFRACT.AUTO: CLEAR
COLOR UR AUTO: YELLOW
GLUCOSE UR STRIP.AUTO-MCNC: NEGATIVE MG/DL
KETONES UR STRIP.AUTO-MCNC: NEGATIVE MG/DL
LEUKOCYTE ESTERASE UR QL STRIP.AUTO: NEGATIVE
NITRITE UR QL STRIP.AUTO: NEGATIVE
PH UR STRIP.AUTO: 6 [PH] (ref 4.5–8)
PROT UR STRIP.AUTO-MCNC: NEGATIVE MG/DL
RBC UR QL AUTO: NEGATIVE
SP GR UR STRIP.AUTO: 1.01 (ref 1–1.03)
UROBILINOGEN UR STRIP.AUTO-MCNC: <2 MG/DL

## 2018-01-17 PROCEDURE — 99214 OFFICE O/P EST MOD 30 MIN: CPT | Performed by: NURSE PRACTITIONER

## 2018-01-17 PROCEDURE — 81003 URINALYSIS AUTO W/O SCOPE: CPT

## 2018-01-17 PROCEDURE — 82306 VITAMIN D 25 HYDROXY: CPT

## 2018-01-17 PROCEDURE — 36415 COLL VENOUS BLD VENIPUNCTURE: CPT

## 2018-01-17 NOTE — PROGRESS NOTES
Kimberly Segovia is a 78year old female. HPI:   Patient presents today to follow up on her recent bone density scan. Her t-score for lumbar spine was -1.7. Hip -2.0. Femoral neck -2.9. She does not take any additional calcium supplements.  She reports she thyroid    • Unspecified sinusitis (chronic)    • Unspecified urinary incontinence       Social History:  Smoking status: Never Smoker                                                              Smokeless tobacco: Never Used                      Alcohol u pathological fracture, unspecified osteoporosis type  Discussed bone density results with the patient and osteoporosis. Discussed starting medication-including Alendronate and Prolia. Pt agreeable to starting Alendronate.   Pt reports she has an upcoming d

## 2018-01-17 NOTE — PATIENT INSTRUCTIONS
Dental exam tomorrow with Dr. Charmian Hodgkin call and update me after your appointment. Alendronate (Fosamax) once per week. Take this medication first thing in the morning with a LARGE glass of water. Sit upright for 30 minutes after taking medication.

## 2018-01-18 LAB — 25-HYDROXYVITAMIN D (TOTAL): 37.6 NG/ML (ref 30–100)

## 2018-01-23 RX ORDER — FAMOTIDINE 40 MG/1
TABLET, FILM COATED ORAL
Qty: 60 TABLET | Refills: 0 | Status: SHIPPED | OUTPATIENT
Start: 2018-01-23 | End: 2018-05-04

## 2018-01-29 ENCOUNTER — TELEPHONE (OUTPATIENT)
Dept: FAMILY MEDICINE CLINIC | Facility: CLINIC | Age: 78
End: 2018-01-29

## 2018-01-29 RX ORDER — ALENDRONATE SODIUM 70 MG/1
70 TABLET ORAL WEEKLY
Qty: 12 TABLET | Refills: 1 | Status: SHIPPED | OUTPATIENT
Start: 2018-01-29 | End: 2018-12-19

## 2018-01-29 NOTE — TELEPHONE ENCOUNTER
Rx sent over to patient's pharmacy for Fosamax. Remind her to take first thing in the AM- once weekly with a full glass of water and sit upright for 30 minutes after.

## 2018-01-29 NOTE — TELEPHONE ENCOUNTER
Pt. Called and was notified of information below. She thanked us for the reminder on how to take the med.

## 2018-01-29 NOTE — TELEPHONE ENCOUNTER
Patient was told to call and update Bhavani Howell in relation to Fosamax. Patient went to Dentist and has filling done. Dentist states he does not foresee any problems.

## 2018-02-07 ENCOUNTER — PRIOR ORIGINAL RECORDS (OUTPATIENT)
Dept: OTHER | Age: 78
End: 2018-02-07

## 2018-02-20 ENCOUNTER — OFFICE VISIT (OUTPATIENT)
Dept: FAMILY MEDICINE CLINIC | Facility: CLINIC | Age: 78
End: 2018-02-20

## 2018-02-20 VITALS
RESPIRATION RATE: 14 BRPM | HEIGHT: 62 IN | HEART RATE: 84 BPM | SYSTOLIC BLOOD PRESSURE: 124 MMHG | DIASTOLIC BLOOD PRESSURE: 70 MMHG | WEIGHT: 134 LBS | BODY MASS INDEX: 24.66 KG/M2

## 2018-02-20 DIAGNOSIS — I10 ESSENTIAL HYPERTENSION: Primary | ICD-10-CM

## 2018-02-20 DIAGNOSIS — R30.0 DYSURIA: ICD-10-CM

## 2018-02-20 DIAGNOSIS — G47.33 OSA (OBSTRUCTIVE SLEEP APNEA): ICD-10-CM

## 2018-02-20 DIAGNOSIS — K44.9 HIATAL HERNIA: ICD-10-CM

## 2018-02-20 DIAGNOSIS — Z78.0 MENOPAUSE: ICD-10-CM

## 2018-02-20 DIAGNOSIS — H26.9 CATARACT OF BOTH EYES, UNSPECIFIED CATARACT TYPE: ICD-10-CM

## 2018-02-20 DIAGNOSIS — E03.9 ACQUIRED HYPOTHYROIDISM: ICD-10-CM

## 2018-02-20 DIAGNOSIS — N81.6 RECTOCELE: ICD-10-CM

## 2018-02-20 DIAGNOSIS — Z87.19 HISTORY OF HEMORRHOIDS: ICD-10-CM

## 2018-02-20 DIAGNOSIS — I83.93 VARICOSE VEINS OF BOTH LOWER EXTREMITIES: ICD-10-CM

## 2018-02-20 DIAGNOSIS — R31.9 HEMATURIA, UNSPECIFIED TYPE: ICD-10-CM

## 2018-02-20 DIAGNOSIS — K57.30 DIVERTICULOSIS OF LARGE INTESTINE WITHOUT HEMORRHAGE: ICD-10-CM

## 2018-02-20 DIAGNOSIS — K21.9 GASTROESOPHAGEAL REFLUX DISEASE, ESOPHAGITIS PRESENCE NOT SPECIFIED: ICD-10-CM

## 2018-02-20 DIAGNOSIS — L30.9 ECZEMA, UNSPECIFIED TYPE: ICD-10-CM

## 2018-02-20 DIAGNOSIS — E55.9 VITAMIN D DEFICIENCY: ICD-10-CM

## 2018-02-20 DIAGNOSIS — F40.01 AGORAPHOBIA WITH PANIC DISORDER: ICD-10-CM

## 2018-02-20 DIAGNOSIS — G47.30 SLEEP-DISORDERED BREATHING: ICD-10-CM

## 2018-02-20 DIAGNOSIS — M81.6 LOCALIZED OSTEOPOROSIS, UNSPECIFIED PATHOLOGICAL FRACTURE PRESENCE: ICD-10-CM

## 2018-02-20 DIAGNOSIS — E78.00 PURE HYPERCHOLESTEROLEMIA: ICD-10-CM

## 2018-02-20 DIAGNOSIS — J30.9 ALLERGIC RHINITIS, UNSPECIFIED SEASONALITY, UNSPECIFIED TRIGGER: ICD-10-CM

## 2018-02-20 LAB — MULTISTIX LOT#: NORMAL NUMERIC

## 2018-02-20 PROCEDURE — 87086 URINE CULTURE/COLONY COUNT: CPT | Performed by: FAMILY MEDICINE

## 2018-02-20 PROCEDURE — 99214 OFFICE O/P EST MOD 30 MIN: CPT | Performed by: FAMILY MEDICINE

## 2018-02-20 PROCEDURE — 81003 URINALYSIS AUTO W/O SCOPE: CPT | Performed by: FAMILY MEDICINE

## 2018-02-20 NOTE — PROGRESS NOTES
Tracee Seo is a 78year old female. HPI:   Pt. Is here for med check.   HTN -- stable; not currently on meds  Dyslipidemia -- stable on lipitor; no side effects  GERD -- stable on famotadine -- taking only 40 mg at night and no side effects  Osteoporo g Rfl: 3        Allergy                     Comment:Denied Adhesive Tape  Contrast Dye [Gadol*        Comment:Iodinated Contrast Media  Doxycycline             Other (See Comments)    Comment:Abdominal cramps  Lansoprazole            Palpitations  Latex Negative Negative   Ketones Urine Negative Negative mg/dL   Blood Urine Negative Negative   pH Urine 6.0 4.5 - 8.0   Protein Urine Negative Negative mg/dl   Urobilinogen Urine <2.0 0.2 - 2.0 mg/dL   Nitrite Urine Negative Negative   Leukocyte Esterase Urin fracture presence  Cataract of both eyes, unspecified cataract type  Rectocele  [place] (obstructive sleep apnea)  Sleep-disordered breathing  History of hemorrhoids  Varicose veins of both lower extremities  Dysuria      Orders Placed This Encounter      Urin

## 2018-02-21 ENCOUNTER — PRIOR ORIGINAL RECORDS (OUTPATIENT)
Dept: OTHER | Age: 78
End: 2018-02-21

## 2018-03-20 ENCOUNTER — HOSPITAL ENCOUNTER (OUTPATIENT)
Dept: CT IMAGING | Facility: HOSPITAL | Age: 78
Discharge: HOME | End: 2018-03-20
Attending: OTOLARYNGOLOGY
Payer: MEDICARE

## 2018-03-20 DIAGNOSIS — J34.3 NASAL TURBINATE HYPERTROPHY: ICD-10-CM

## 2018-03-20 DIAGNOSIS — J32.0 CHRONIC MAXILLARY SINUSITIS: ICD-10-CM

## 2018-03-20 DIAGNOSIS — J34.89 NASAL OBSTRUCTION: ICD-10-CM

## 2018-03-20 PROCEDURE — 70486 CT MAXILLOFACIAL W/O DYE: CPT | Performed by: OTOLARYNGOLOGY

## 2018-04-09 ENCOUNTER — PRIOR ORIGINAL RECORDS (OUTPATIENT)
Dept: OTHER | Age: 78
End: 2018-04-09

## 2018-04-24 ENCOUNTER — MYAURORA ACCOUNT LINK (OUTPATIENT)
Dept: OTHER | Age: 78
End: 2018-04-24

## 2018-05-07 RX ORDER — FAMOTIDINE 40 MG/1
TABLET, FILM COATED ORAL
Qty: 60 TABLET | Refills: 0 | Status: SHIPPED | OUTPATIENT
Start: 2018-05-07 | End: 2018-07-03

## 2018-06-08 ENCOUNTER — OFFICE VISIT (OUTPATIENT)
Dept: FAMILY MEDICINE CLINIC | Facility: CLINIC | Age: 78
End: 2018-06-08

## 2018-06-08 VITALS
TEMPERATURE: 98 F | HEIGHT: 62 IN | HEART RATE: 72 BPM | SYSTOLIC BLOOD PRESSURE: 130 MMHG | RESPIRATION RATE: 18 BRPM | BODY MASS INDEX: 24.29 KG/M2 | WEIGHT: 132 LBS | DIASTOLIC BLOOD PRESSURE: 66 MMHG

## 2018-06-08 DIAGNOSIS — I83.93 VARICOSE VEINS OF BOTH LOWER EXTREMITIES, UNSPECIFIED WHETHER COMPLICATED: ICD-10-CM

## 2018-06-08 DIAGNOSIS — J30.9 ALLERGIC RHINITIS, UNSPECIFIED SEASONALITY, UNSPECIFIED TRIGGER: ICD-10-CM

## 2018-06-08 DIAGNOSIS — Z71.85 VACCINE COUNSELING: ICD-10-CM

## 2018-06-08 DIAGNOSIS — M81.6 LOCALIZED OSTEOPOROSIS, UNSPECIFIED PATHOLOGICAL FRACTURE PRESENCE: ICD-10-CM

## 2018-06-08 DIAGNOSIS — G47.33 OSA (OBSTRUCTIVE SLEEP APNEA): ICD-10-CM

## 2018-06-08 DIAGNOSIS — K44.9 HIATAL HERNIA: ICD-10-CM

## 2018-06-08 DIAGNOSIS — E55.9 VITAMIN D DEFICIENCY: ICD-10-CM

## 2018-06-08 DIAGNOSIS — Z78.0 MENOPAUSE: ICD-10-CM

## 2018-06-08 DIAGNOSIS — F40.01 AGORAPHOBIA WITH PANIC DISORDER: ICD-10-CM

## 2018-06-08 DIAGNOSIS — Z87.19 HISTORY OF HEMORRHOIDS: ICD-10-CM

## 2018-06-08 DIAGNOSIS — I10 ESSENTIAL HYPERTENSION: ICD-10-CM

## 2018-06-08 DIAGNOSIS — Z23 NEED FOR VACCINATION: ICD-10-CM

## 2018-06-08 DIAGNOSIS — Z01.419 WELL FEMALE EXAM WITH ROUTINE GYNECOLOGICAL EXAM: ICD-10-CM

## 2018-06-08 DIAGNOSIS — E78.00 PURE HYPERCHOLESTEROLEMIA: ICD-10-CM

## 2018-06-08 DIAGNOSIS — H26.9 CATARACT OF BOTH EYES, UNSPECIFIED CATARACT TYPE: ICD-10-CM

## 2018-06-08 DIAGNOSIS — Z13.31 DEPRESSION SCREENING: ICD-10-CM

## 2018-06-08 DIAGNOSIS — Z00.00 ENCOUNTER FOR ANNUAL HEALTH EXAMINATION: Primary | ICD-10-CM

## 2018-06-08 DIAGNOSIS — Z86.39 HISTORY OF VITAMIN D DEFICIENCY: ICD-10-CM

## 2018-06-08 DIAGNOSIS — L30.9 ECZEMA, UNSPECIFIED TYPE: ICD-10-CM

## 2018-06-08 DIAGNOSIS — K21.9 GASTROESOPHAGEAL REFLUX DISEASE, ESOPHAGITIS PRESENCE NOT SPECIFIED: ICD-10-CM

## 2018-06-08 DIAGNOSIS — K57.30 DIVERTICULOSIS OF LARGE INTESTINE WITHOUT HEMORRHAGE: ICD-10-CM

## 2018-06-08 DIAGNOSIS — E78.5 DYSLIPIDEMIA: ICD-10-CM

## 2018-06-08 DIAGNOSIS — E03.9 ACQUIRED HYPOTHYROIDISM: ICD-10-CM

## 2018-06-08 DIAGNOSIS — R31.9 HEMATURIA, UNSPECIFIED TYPE: ICD-10-CM

## 2018-06-08 DIAGNOSIS — N81.11 CYSTOCELE, MIDLINE: ICD-10-CM

## 2018-06-08 PROBLEM — G47.30 SLEEP-DISORDERED BREATHING: Status: RESOLVED | Noted: 2017-06-12 | Resolved: 2018-06-08

## 2018-06-08 PROCEDURE — G0101 CA SCREEN;PELVIC/BREAST EXAM: HCPCS | Performed by: FAMILY MEDICINE

## 2018-06-08 PROCEDURE — 88175 CYTOPATH C/V AUTO FLUID REDO: CPT | Performed by: FAMILY MEDICINE

## 2018-06-08 PROCEDURE — G0444 DEPRESSION SCREEN ANNUAL: HCPCS | Performed by: FAMILY MEDICINE

## 2018-06-08 PROCEDURE — G0439 PPPS, SUBSEQ VISIT: HCPCS | Performed by: FAMILY MEDICINE

## 2018-06-08 NOTE — PROGRESS NOTES
HPI:   Felix Whiting is a 78year old female who presents for a Medicare Subsequent Annual Wellness visit (Pt already had Initial Annual Wellness). Pt. Complains of nothing.     Her last annual assessment has been over 1 year: Annual Physical due on valium.     CURRENT MEDICATIONS:     Outpatient Prescriptions Marked as Taking for the 6/8/18 encounter (Office Visit) with Elaina Garcia DO:  Levothyroxine Sodium 25 MCG Oral Tab    FAMOTIDINE 40 MG Oral Tab TAKE 1 TABLET BY MOUTH 2 TIMES DAILY   AZELASTI father; Gastro-Intestinal Disorder in her sister; Heart Attack in her mother and sister; Heart Disease in her sister; Heart Surgery in her sister; Lung Disorder in her father; Other in her mother; Stroke in her mother; other in her maternal grandmother. B/L breasts symmetric. No tenderness. No nipple discharge. No masses. No axillary lymphadenopathy. ANUS & PERINEUM:  No visible rashes or lesions. RECTAL:  Normal sphincter tone. Ext. hemorrhoid. No masses. EXTERNAL GENITALIA:  Normal appearance.   No unspecified type    Vitamin D deficiency    Essential hypertension    Allergic rhinitis, unspecified seasonality, unspecified trigger    Eczema, unspecified type    Agoraphobia with panic disorder    Diverticulosis of large intestine without hemorrhage agrees to the plan. Return in about 3 months (around 9/8/2018) for dyslipidemia, osteopenia, medication management, GERD, etc....      Elaina Gomez DO, 5/16/2017     General Health     In the past six months, have you lost more than 10 pounds without tr go to [employer]" under Medicare Assessment section in Charting, test patient and document. Then, refresh your progress note to see your input here.   Cognitive Assessment     What day of the week is this?: Correct    What month is it?: Bharathi Annually if high risk No results found for: CHLAMYDIA No flowsheet data found. Screening Mammogram      Mammogram Annually to 78, then as discussed There are no preventive care reminders to display for this patient.  Update byUs if applicab found for: DIGOXIN, DIG, VALP No flowsheet data found. Diabetes      HgbA1C  Annually No results found for: A1C No flowsheet data found.     Creat/alb ratio  Annually      LDL  Annually LDL CHOLESTROL (mg/dL)   Date Value   09/06/2012 142 (H)     LDL Cho

## 2018-06-08 NOTE — PATIENT INSTRUCTIONS
Minerva Hameed's SCREENING SCHEDULE   Tests on this list are recommended by your physician but may not be covered, or covered at this frequency, by your insurer. Please check with your insurance carrier before scheduling to verify coverage.    PREVENTATI criteria:   • Men who are 73-68 years old and have smoked more than 100 cigarettes in their lifetime   • Anyone with a family history    Colorectal Cancer Screening  Covered up to Age 76     Colonoscopy Screen   Covered every 10 years- more often if abnorm Influenza  Covered Annually   Orders placed or performed in visit on 10/27/16  -FLU VACC [address] ANTIG   Orders placed or performed in visit on 10/27/15  -FLU VACC PRSV FREE INC ANTIG   Orders placed or performed in visit on 10/24/14  -INFLUENZA V [employer]. This site has a lot of good information including definitions of the different types of Advance Directives.  It also has the State forms available on it's website for anyone to review and print using their home computer and p

## 2018-06-13 ENCOUNTER — HOSPITAL ENCOUNTER (OUTPATIENT)
Dept: ULTRASOUND IMAGING | Age: 78
Discharge: HOME | End: 2018-06-13
Attending: OTOLARYNGOLOGY
Payer: MEDICARE

## 2018-06-13 DIAGNOSIS — E07.9 THYROID DISORDER: ICD-10-CM

## 2018-06-13 DIAGNOSIS — E04.1 THYROID NODULE: ICD-10-CM

## 2018-06-13 PROCEDURE — 76536 US EXAM OF HEAD AND NECK: CPT | Performed by: OTOLARYNGOLOGY

## 2018-07-03 RX ORDER — FAMOTIDINE 40 MG/1
TABLET, FILM COATED ORAL
Qty: 60 TABLET | Refills: 3 | Status: SHIPPED | OUTPATIENT
Start: 2018-07-03 | End: 2019-06-21

## 2018-07-13 ENCOUNTER — LAB ENCOUNTER (OUTPATIENT)
Dept: LAB | Age: 78
End: 2018-07-13
Attending: INTERNAL MEDICINE
Payer: MEDICARE

## 2018-07-13 DIAGNOSIS — E04.1 NONTOXIC UNINODULAR GOITER: Primary | ICD-10-CM

## 2018-07-13 DIAGNOSIS — G31.84 MILD COGNITIVE IMPAIRMENT, SO STATED: ICD-10-CM

## 2018-07-13 DIAGNOSIS — M81.0 SENILE OSTEOPOROSIS: ICD-10-CM

## 2018-07-13 LAB
HAV AB SERPL IA-ACNC: 435 PG/ML (ref 193–986)
TSI SER-ACNC: 1.46 MIU/ML (ref 0.35–5.5)

## 2018-07-13 PROCEDURE — 84443 ASSAY THYROID STIM HORMONE: CPT

## 2018-07-13 PROCEDURE — 36415 COLL VENOUS BLD VENIPUNCTURE: CPT

## 2018-07-13 PROCEDURE — 82607 VITAMIN B-12: CPT

## 2018-07-16 ENCOUNTER — TELEPHONE (OUTPATIENT)
Dept: FAMILY MEDICINE CLINIC | Facility: CLINIC | Age: 78
End: 2018-07-16

## 2018-07-16 NOTE — TELEPHONE ENCOUNTER
Call to pt-reports approx one wk hx of light tan stools. sts passes stools easily, no recent change in diet, frequency or consistency of stools and no abd pain.  Reports also feels abd muscles are weak-\"when standing, always used to be able to pull my abd

## 2018-07-16 NOTE — TELEPHONE ENCOUNTER
Pt stools have been a light tan color for a week now. Pt also states it feels like she is weak in the abdominal area.

## 2018-07-17 ENCOUNTER — LAB ENCOUNTER (OUTPATIENT)
Dept: LAB | Age: 78
End: 2018-07-17
Attending: NURSE PRACTITIONER
Payer: MEDICARE

## 2018-07-17 ENCOUNTER — OFFICE VISIT (OUTPATIENT)
Dept: FAMILY MEDICINE CLINIC | Facility: CLINIC | Age: 78
End: 2018-07-17
Payer: MEDICARE

## 2018-07-17 ENCOUNTER — PRIOR ORIGINAL RECORDS (OUTPATIENT)
Dept: OTHER | Age: 78
End: 2018-07-17

## 2018-07-17 VITALS
RESPIRATION RATE: 16 BRPM | HEIGHT: 62 IN | TEMPERATURE: 98 F | BODY MASS INDEX: 23.92 KG/M2 | HEART RATE: 80 BPM | DIASTOLIC BLOOD PRESSURE: 72 MMHG | WEIGHT: 130 LBS | SYSTOLIC BLOOD PRESSURE: 126 MMHG

## 2018-07-17 DIAGNOSIS — I10 ESSENTIAL HYPERTENSION: ICD-10-CM

## 2018-07-17 DIAGNOSIS — E03.9 ACQUIRED HYPOTHYROIDISM: ICD-10-CM

## 2018-07-17 DIAGNOSIS — R19.5 CHANGE IN STOOL: ICD-10-CM

## 2018-07-17 DIAGNOSIS — E78.00 PURE HYPERCHOLESTEROLEMIA: ICD-10-CM

## 2018-07-17 DIAGNOSIS — E55.9 VITAMIN D DEFICIENCY: ICD-10-CM

## 2018-07-17 DIAGNOSIS — R19.5 CHANGE IN STOOL: Primary | ICD-10-CM

## 2018-07-17 LAB
25-HYDROXYVITAMIN D (TOTAL): 31.7 NG/ML (ref 30–100)
ALBUMIN SERPL-MCNC: 4 G/DL (ref 3.5–4.8)
ALP LIVER SERPL-CCNC: 83 U/L (ref 55–142)
ALT SERPL-CCNC: 26 U/L (ref 14–54)
AST SERPL-CCNC: 19 U/L (ref 15–41)
BASOPHILS # BLD AUTO: 0.04 X10(3) UL (ref 0–0.1)
BASOPHILS NFR BLD AUTO: 0.7 %
BILIRUB SERPL-MCNC: 0.9 MG/DL (ref 0.1–2)
BUN BLD-MCNC: 19 MG/DL (ref 8–20)
CALCIUM BLD-MCNC: 9.4 MG/DL (ref 8.3–10.3)
CHLORIDE: 104 MMOL/L (ref 101–111)
CHOLEST SMN-MCNC: 197 MG/DL (ref ?–200)
CO2: 28 MMOL/L (ref 22–32)
CREAT BLD-MCNC: 0.75 MG/DL (ref 0.55–1.02)
EOSINOPHIL # BLD AUTO: 0.1 X10(3) UL (ref 0–0.3)
EOSINOPHIL NFR BLD AUTO: 1.7 %
ERYTHROCYTE [DISTWIDTH] IN BLOOD BY AUTOMATED COUNT: 12.5 % (ref 11.5–16)
FREE T4: 0.9 NG/DL (ref 0.9–1.8)
GLUCOSE BLD-MCNC: 74 MG/DL (ref 70–99)
HCT VFR BLD AUTO: 45.7 % (ref 34–50)
HDLC SERPL-MCNC: 63 MG/DL (ref 45–?)
HDLC SERPL: 3.13 {RATIO} (ref ?–4.44)
HGB BLD-MCNC: 14.1 G/DL (ref 12–16)
IMMATURE GRANULOCYTE COUNT: 0.01 X10(3) UL (ref 0–1)
IMMATURE GRANULOCYTE RATIO %: 0.2 %
LDLC SERPL CALC-MCNC: 106 MG/DL (ref ?–130)
LYMPHOCYTES # BLD AUTO: 1.55 X10(3) UL (ref 0.9–4)
LYMPHOCYTES NFR BLD AUTO: 26 %
M PROTEIN MFR SERPL ELPH: 7.7 G/DL (ref 6.1–8.3)
MCH RBC QN AUTO: 28.7 PG (ref 27–33.2)
MCHC RBC AUTO-ENTMCNC: 30.9 G/DL (ref 31–37)
MCV RBC AUTO: 92.9 FL (ref 81–100)
MONOCYTES # BLD AUTO: 0.6 X10(3) UL (ref 0.1–1)
MONOCYTES NFR BLD AUTO: 10.1 %
NEUTROPHIL ABS PRELIM: 3.66 X10 (3) UL (ref 1.3–6.7)
NEUTROPHILS # BLD AUTO: 3.66 X10(3) UL (ref 1.3–6.7)
NEUTROPHILS NFR BLD AUTO: 61.3 %
NONHDLC SERPL-MCNC: 134 MG/DL (ref ?–130)
PLATELET # BLD AUTO: 299 10(3)UL (ref 150–450)
POTASSIUM SERPL-SCNC: 4.3 MMOL/L (ref 3.6–5.1)
RBC # BLD AUTO: 4.92 X10(6)UL (ref 3.8–5.1)
RED CELL DISTRIBUTION WIDTH-SD: 42.9 FL (ref 35.1–46.3)
SODIUM SERPL-SCNC: 142 MMOL/L (ref 136–144)
TRIGL SERPL-MCNC: 138 MG/DL (ref ?–150)
VLDLC SERPL CALC-MCNC: 28 MG/DL (ref 5–40)
WBC # BLD AUTO: 6 X10(3) UL (ref 4–13)

## 2018-07-17 PROCEDURE — 82306 VITAMIN D 25 HYDROXY: CPT

## 2018-07-17 PROCEDURE — 85025 COMPLETE CBC W/AUTO DIFF WBC: CPT

## 2018-07-17 PROCEDURE — 36415 COLL VENOUS BLD VENIPUNCTURE: CPT

## 2018-07-17 PROCEDURE — 80061 LIPID PANEL: CPT

## 2018-07-17 PROCEDURE — 84439 ASSAY OF FREE THYROXINE: CPT

## 2018-07-17 PROCEDURE — 99213 OFFICE O/P EST LOW 20 MIN: CPT | Performed by: NURSE PRACTITIONER

## 2018-07-17 PROCEDURE — 80053 COMPREHEN METABOLIC PANEL: CPT

## 2018-07-17 NOTE — PROGRESS NOTES
Radha Worthington is a 78year old female. HPI:   Patient presents today reporting a change in her stools for the past 1.5-2 weeks. She reports that her stools have been tan in color up until this morning.  She reports that her stool this morning was brown i daily. Disp: 80 g Rfl: 3      Past Medical History:   Diagnosis Date   • Anxiety state, unspecified 1995 and 1993   • Dizziness and giddiness    • Dysfunction of eustachian tube    • Dyspepsia and other specified disorders of function of stomach    • Dysph bilaterally, throat clear no erythema without mass.    EYES: sclera clear without injection  NECK: supple,no adenopathy, thyroid normal to palpation  LUNGS: clear to auscultation no rales, rhonchi or wheezes  CARDIO: RRR without murmur  GI: Normal bowel killian

## 2018-08-07 ENCOUNTER — OFFICE VISIT (OUTPATIENT)
Dept: SURGERY | Facility: CLINIC | Age: 78
End: 2018-08-07
Payer: MEDICARE

## 2018-08-07 VITALS
HEART RATE: 89 BPM | BODY MASS INDEX: 23.92 KG/M2 | DIASTOLIC BLOOD PRESSURE: 74 MMHG | WEIGHT: 130 LBS | HEIGHT: 62 IN | SYSTOLIC BLOOD PRESSURE: 135 MMHG | TEMPERATURE: 99 F

## 2018-08-07 DIAGNOSIS — K64.8 INTERNAL AND EXTERNAL PROLAPSED HEMORRHOIDS: Primary | ICD-10-CM

## 2018-08-07 PROCEDURE — 46221 LIGATION OF HEMORRHOID(S): CPT | Performed by: COLON & RECTAL SURGERY

## 2018-08-07 NOTE — PROCEDURES
Pre op diagnosis: Internal Hemorrhoids    Post op diagnosis: Same    Procedure: Anoscopy with O-ring Rubber Band Ligation of Internal Hemorrhoids    Surgeon: Judy Torrez MD    History of present illness:    This patient has internal hemorrhoids that are s

## 2018-08-07 NOTE — PROGRESS NOTES
Follow Up Visit Note       Active Problems      1.  Internal and external prolapsed hemorrhoids          Chief Complaint   Patient presents with:  Hemorrhoids: est pt reccurent hemorrhoids, last seen in 2/2017 for hemorrhoids, had 5 bandings, Gyne told her She does not recall any significant tearing or an episiotomy with her delivery. I acted as a scribe in this encounter. The physician obtained a history, independently performed a physical exam, and developed an assessment and plan.   The physician gricelda JACKIE NEEDLE LOCALIZATION W/ SPECIMEN 1 SITE LEFT  No date: NASAL SURG PROC UNLISTED  7/3/2007: OTHER SURGICAL HISTORY      Comment: sleep study  1/8/2010: OTHER SURGICAL HISTORY      Comment: left thyroid removed nodule  No date: THYROIDECTOMY      Comment: tablet Rfl: 3   Clobetasol Propionate (TEMOVATE) 0.05 % External Ointment Bid to hands Disp: 60 g Rfl: 1   Azelastine HCl 0.1 % Nasal Solution 1 spray by Nasal route 2 (two) times daily.  Disp: 1 Bottle Rfl: 3   Levothyroxine Sodium 25 MCG Oral Tab Take 25 (36.9 °C)   Ht 62\"   Wt 130 lb   LMP 01/01/1992   BMI 23.78 kg/m²   Physical Exam   Constitutional: She is oriented to person, place, and time. She appears well-developed and well-nourished. No distress. HENT:   Head: Normocephalic and atraumatic.    Eye care physician, Dr. Giovani Chang. The patient felt a hemorrhoid returning in January 2018. This caused mild symptoms of protrusion. She then saw her primary care physician in June 2018.   She was told that she had a hemorrhoid and presents today for further sounds. Examination of the perineum reveals her to be a right anterior lateral mixed type III internal and external prolapsing hemorrhoid. Digital rectal exam demonstrates a large rectocele anteriorly. Anoscopy was performed.   This demonstrates the one

## 2018-08-07 NOTE — PATIENT INSTRUCTIONS
This patient presents today at the referral of her primary care physician, Dr. Alvarado Sanders. The patient felt a hemorrhoid returning in January 2018. This caused mild symptoms of protrusion. She then saw her primary care physician in June 2018.   She was to abdomen is soft, nontender, nondistended, normoactive bowel sounds. Examination of the perineum reveals her to be a right anterior lateral mixed type III internal and external prolapsing hemorrhoid.   Digital rectal exam demonstrates a large rectocele ante

## 2018-08-17 ENCOUNTER — OFFICE VISIT (OUTPATIENT)
Dept: FAMILY MEDICINE CLINIC | Facility: CLINIC | Age: 78
End: 2018-08-17
Payer: MEDICARE

## 2018-08-17 VITALS
BODY MASS INDEX: 24.29 KG/M2 | TEMPERATURE: 98 F | DIASTOLIC BLOOD PRESSURE: 80 MMHG | HEIGHT: 62 IN | RESPIRATION RATE: 14 BRPM | WEIGHT: 132 LBS | SYSTOLIC BLOOD PRESSURE: 130 MMHG | HEART RATE: 76 BPM

## 2018-08-17 DIAGNOSIS — F40.01 AGORAPHOBIA WITH PANIC DISORDER: ICD-10-CM

## 2018-08-17 DIAGNOSIS — I83.93 VARICOSE VEINS OF BOTH LOWER EXTREMITIES, UNSPECIFIED WHETHER COMPLICATED: ICD-10-CM

## 2018-08-17 DIAGNOSIS — K57.30 DIVERTICULOSIS OF LARGE INTESTINE WITHOUT HEMORRHAGE: ICD-10-CM

## 2018-08-17 DIAGNOSIS — E78.00 PURE HYPERCHOLESTEROLEMIA: Primary | ICD-10-CM

## 2018-08-17 DIAGNOSIS — K64.8 INTERNAL AND EXTERNAL PROLAPSED HEMORRHOIDS: ICD-10-CM

## 2018-08-17 DIAGNOSIS — E53.8 VITAMIN B12 DEFICIENCY: ICD-10-CM

## 2018-08-17 DIAGNOSIS — K21.9 GASTROESOPHAGEAL REFLUX DISEASE, ESOPHAGITIS PRESENCE NOT SPECIFIED: ICD-10-CM

## 2018-08-17 DIAGNOSIS — G47.33 OSA (OBSTRUCTIVE SLEEP APNEA): ICD-10-CM

## 2018-08-17 DIAGNOSIS — E55.9 VITAMIN D DEFICIENCY: ICD-10-CM

## 2018-08-17 DIAGNOSIS — J30.9 ALLERGIC RHINITIS, UNSPECIFIED SEASONALITY, UNSPECIFIED TRIGGER: ICD-10-CM

## 2018-08-17 DIAGNOSIS — Z12.31 ENCOUNTER FOR SCREENING MAMMOGRAM FOR MALIGNANT NEOPLASM OF BREAST: ICD-10-CM

## 2018-08-17 DIAGNOSIS — E78.5 DYSLIPIDEMIA: ICD-10-CM

## 2018-08-17 DIAGNOSIS — E03.9 ACQUIRED HYPOTHYROIDISM: ICD-10-CM

## 2018-08-17 DIAGNOSIS — E04.2 MULTIPLE THYROID NODULES: ICD-10-CM

## 2018-08-17 DIAGNOSIS — I10 ESSENTIAL HYPERTENSION: ICD-10-CM

## 2018-08-17 DIAGNOSIS — M81.6 LOCALIZED OSTEOPOROSIS, UNSPECIFIED PATHOLOGICAL FRACTURE PRESENCE: ICD-10-CM

## 2018-08-17 DIAGNOSIS — L30.9 ECZEMA, UNSPECIFIED TYPE: ICD-10-CM

## 2018-08-17 PROCEDURE — 99214 OFFICE O/P EST MOD 30 MIN: CPT | Performed by: FAMILY MEDICINE

## 2018-08-17 RX ORDER — CLOBETASOL PROPIONATE 0.5 MG/G
OINTMENT TOPICAL
Qty: 15 G | Refills: 1 | Status: SHIPPED | OUTPATIENT
Start: 2018-08-17 | End: 2019-03-22

## 2018-08-17 NOTE — PROGRESS NOTES
Shelly Patel is a 70year old female. HPI:   Pt. Is here for med check.   HTN -- stable; not currently on meds  Dyslipidemia -- stable on lipitor; no side effects  GERD -- stable on famotadine -- taking only 40 mg at night and no side effects  Osteoporo Sulfa Antibiotics       RASH, OTHER (SEE COMMENTS)    Comment:redness  Allergy                     Comment:Denied Adhesive Tape  Ciprofloxacin           RASH  Contrast Dye [Gadol*        Comment:Iodinated Contrast Media  Doxycycline             OTHE >=60   GFR, -American 90 >=60   Calcium, Total 9.4 8.3 - 10.3 mg/dL   Alkaline Phosphatase 83 55 - 142 U/L   AST 19 15 - 41 U/L   Alt 26 14 - 54 U/L   Bilirubin, Total 0.9 0.1 - 2.0 mg/dL   Total Protein 7.7 6.1 - 8.3 g/dL   Albumin 4.0 3.5 - 4.8 g/ on exertion  GI: denies abdominal pain,denies heartburn  :  dysuria   RECTAL: hemorrhoids  -- resolved  MUSCULOSKELETAL: denies back pain  NEURO: denies headaches  PSYCHE: denies depression or anxiety  HEMATOLOGIC: denies hx of anemia  ENDOCRINE: thyroid Imaging & Consults:  PNEUMOCOCCAL IMM (PNEUMOVAX)  JACKIE SCREENING BILAT (CPT=77081)   HTN -- stable; not currently on meds  Dyslipidemia -- stable on lipitor; no side effects  GERD -- stable on famotadine -- taking only 40 mg at night and no side ef

## 2018-08-27 ENCOUNTER — MYAURORA ACCOUNT LINK (OUTPATIENT)
Dept: OTHER | Age: 78
End: 2018-08-27

## 2018-08-27 ENCOUNTER — PRIOR ORIGINAL RECORDS (OUTPATIENT)
Dept: OTHER | Age: 78
End: 2018-08-27

## 2018-09-06 LAB
ALBUMIN: 4 G/DL
ALKALINE PHOSPHATATE(ALK PHOS): 83 IU/L
BILIRUBIN TOTAL: 0.9 MG/DL
BUN: 19 MG/DL
CALCIUM: 9.4 MG/DL
CHLORIDE: 104 MEQ/L
CHOLESTEROL, TOTAL: 197 MG/DL
CREATININE, SERUM: 0.75 MG/DL
FREE T4: 0.9 MG/DL
GLUCOSE: 74 MG/DL
HDL CHOLESTEROL: 63 MG/DL
HEMATOCRIT: 45.7 %
HEMOGLOBIN: 14.1 G/DL
LDL CHOLESTEROL: 106 MG/DL
PLATELETS: 299 K/UL
POTASSIUM, SERUM: 4.3 MEQ/L
PROTEIN, TOTAL: 7.7 G/DL
RED BLOOD COUNT: 4.92 X 10-6/U
SGOT (AST): 19 IU/L
SGPT (ALT): 26 IU/L
SODIUM: 142 MEQ/L
TRIGLYCERIDES: 138 MG/DL
VITAMIN D 25-OH: 31.7 NG/ML
WHITE BLOOD COUNT: 6 X 10-3/U

## 2018-09-25 ENCOUNTER — OFFICE VISIT (OUTPATIENT)
Dept: SLEEP CENTER | Facility: HOSPITAL | Age: 78
End: 2018-09-25
Attending: INTERNAL MEDICINE
Payer: MEDICARE

## 2018-09-25 DIAGNOSIS — G47.33 OSA (OBSTRUCTIVE SLEEP APNEA): ICD-10-CM

## 2018-09-25 PROCEDURE — 95810 POLYSOM 6/> YRS 4/> PARAM: CPT

## 2018-10-05 NOTE — PROCEDURES
[address]       Accredited by the [facility] of Sleep Medicine (AASM)    PATIENT'S NAME:        Cong Coulter  ATTENDING PHYSICIAN:   Xavi Elias M.D.   REFERRING PHYSICIAN:   Xavi Elias M.D.  PA pulse oximetry. In accordance with AASM recommendations, hypopnea events are scored based on an oxygen saturation more than or equal to 4 percent. Body position is documented via technician notes every 15 minutes.        In addition, oral appliance was wo 2. 8.    RECOMMENDATIONS:  At this time, use of an oral appliance in combination with positional therapy appears to effectively treat the patient's obstructive sleep apnea.   If the patient once again is unable to tolerate the oral appliance due to jaw pain

## 2018-11-20 ENCOUNTER — IMMUNIZATION (OUTPATIENT)
Dept: FAMILY MEDICINE CLINIC | Facility: CLINIC | Age: 78
End: 2018-11-20
Payer: MEDICARE

## 2018-11-20 PROCEDURE — G0008 ADMIN INFLUENZA VIRUS VAC: HCPCS | Performed by: FAMILY MEDICINE

## 2018-11-20 PROCEDURE — 90653 IIV ADJUVANT VACCINE IM: CPT | Performed by: FAMILY MEDICINE

## 2018-12-19 RX ORDER — ALENDRONATE SODIUM 70 MG/1
TABLET ORAL
Qty: 12 TABLET | Refills: 0 | Status: SHIPPED | OUTPATIENT
Start: 2018-12-19 | End: 2019-08-22

## 2019-02-15 ENCOUNTER — TELEPHONE (OUTPATIENT)
Dept: FAMILY MEDICINE CLINIC | Facility: CLINIC | Age: 79
End: 2019-02-15

## 2019-02-15 NOTE — TELEPHONE ENCOUNTER
I have LM for pt to cb for more info on this but do you do recall what she is referring to? ? You saw her 1/2018 for her dexa results. I don't see any notes suggesting her to do an [facility] (in case she meant the specials they run for that test in Feb).

## 2019-02-15 NOTE — TELEPHONE ENCOUNTER
Call back from pt-sts mention was made by [facility] at January 2018 appt re calling back to ofc re heart calcium testing offered by [facility]. Discussed that if heart calcium test recommended, it is called [facility].  Discussed this is a self pay test but edward run

## 2019-02-15 NOTE — TELEPHONE ENCOUNTER
Pt called. She said that Ryan Keene told her, at the end of 2018, to call the office around February 2019 to find out if [address] is offering a calcium test.  Please call pt and let her know.

## 2019-02-16 NOTE — TELEPHONE ENCOUNTER
I'm not entirely sure given this was 1+ years ago--would not be due to her osteoporosis. Looks as though patient does take Atorvastatin and her cholesterol has been stable.

## 2019-02-18 NOTE — TELEPHONE ENCOUNTER
[employer] message sent to pt w lynn comments and recommending she proceed w fasting labs dr Michael Razo ordered for January 2019, schedule follow up appt dr Michael Razo requested for February 2019 and discuss any need for heart calcium or other testing.

## 2019-02-28 VITALS
HEIGHT: 62 IN | RESPIRATION RATE: 16 BRPM | HEART RATE: 60 BPM | WEIGHT: 130 LBS | SYSTOLIC BLOOD PRESSURE: 132 MMHG | DIASTOLIC BLOOD PRESSURE: 72 MMHG | BODY MASS INDEX: 23.92 KG/M2

## 2019-02-28 VITALS
BODY MASS INDEX: 23.74 KG/M2 | DIASTOLIC BLOOD PRESSURE: 70 MMHG | SYSTOLIC BLOOD PRESSURE: 126 MMHG | WEIGHT: 129 LBS | HEART RATE: 84 BPM | HEIGHT: 62 IN

## 2019-02-28 VITALS
HEART RATE: 76 BPM | DIASTOLIC BLOOD PRESSURE: 76 MMHG | WEIGHT: 130 LBS | HEIGHT: 62 IN | RESPIRATION RATE: 16 BRPM | SYSTOLIC BLOOD PRESSURE: 124 MMHG | BODY MASS INDEX: 23.92 KG/M2

## 2019-02-28 VITALS
HEIGHT: 62 IN | SYSTOLIC BLOOD PRESSURE: 128 MMHG | DIASTOLIC BLOOD PRESSURE: 72 MMHG | WEIGHT: 132 LBS | BODY MASS INDEX: 24.29 KG/M2 | HEART RATE: 80 BPM

## 2019-03-07 ENCOUNTER — APPOINTMENT (OUTPATIENT)
Dept: LAB | Age: 79
End: 2019-03-07
Attending: FAMILY MEDICINE
Payer: MEDICARE

## 2019-03-07 DIAGNOSIS — E55.9 VITAMIN D DEFICIENCY: ICD-10-CM

## 2019-03-07 DIAGNOSIS — E78.5 DYSLIPIDEMIA: ICD-10-CM

## 2019-03-07 DIAGNOSIS — I10 ESSENTIAL HYPERTENSION: ICD-10-CM

## 2019-03-07 LAB
ALBUMIN SERPL-MCNC: 3.9 G/DL (ref 3.4–5)
ALBUMIN/GLOB SERPL: 1.1 {RATIO} (ref 1–2)
ALP LIVER SERPL-CCNC: 77 U/L (ref 55–142)
ALT SERPL-CCNC: 17 U/L (ref 13–56)
ANION GAP SERPL CALC-SCNC: 6 MMOL/L (ref 0–18)
AST SERPL-CCNC: 14 U/L (ref 15–37)
BILIRUB SERPL-MCNC: 1 MG/DL (ref 0.1–2)
BUN BLD-MCNC: 16 MG/DL (ref 7–18)
BUN/CREAT SERPL: 20.5 (ref 10–20)
CALCIUM BLD-MCNC: 8.9 MG/DL (ref 8.5–10.1)
CHLORIDE SERPL-SCNC: 105 MMOL/L (ref 98–107)
CHOLEST SMN-MCNC: 171 MG/DL (ref ?–200)
CO2 SERPL-SCNC: 28 MMOL/L (ref 21–32)
CREAT BLD-MCNC: 0.78 MG/DL (ref 0.55–1.02)
GLOBULIN PLAS-MCNC: 3.5 G/DL (ref 2.8–4.4)
GLUCOSE BLD-MCNC: 90 MG/DL (ref 70–99)
HDLC SERPL-MCNC: 71 MG/DL (ref 40–59)
LDLC SERPL CALC-MCNC: 83 MG/DL (ref ?–100)
M PROTEIN MFR SERPL ELPH: 7.4 G/DL (ref 6.4–8.2)
NONHDLC SERPL-MCNC: 100 MG/DL (ref ?–130)
OSMOLALITY SERPL CALC.SUM OF ELEC: 289 MOSM/KG (ref 275–295)
POTASSIUM SERPL-SCNC: 4.3 MMOL/L (ref 3.5–5.1)
SODIUM SERPL-SCNC: 139 MMOL/L (ref 136–145)
TRIGL SERPL-MCNC: 84 MG/DL (ref 30–149)
VIT D+METAB SERPL-MCNC: 37.2 NG/ML (ref 30–100)
VLDLC SERPL CALC-MCNC: 17 MG/DL (ref 0–30)

## 2019-03-07 PROCEDURE — 80053 COMPREHEN METABOLIC PANEL: CPT

## 2019-03-07 PROCEDURE — 80061 LIPID PANEL: CPT

## 2019-03-07 PROCEDURE — 82306 VITAMIN D 25 HYDROXY: CPT

## 2019-03-07 PROCEDURE — 36415 COLL VENOUS BLD VENIPUNCTURE: CPT

## 2019-05-07 ENCOUNTER — HOSPITAL ENCOUNTER (OUTPATIENT)
Dept: GENERAL RADIOLOGY | Age: 79
Discharge: HOME | End: 2019-05-07
Attending: NURSE PRACTITIONER
Payer: MEDICARE

## 2019-05-07 ENCOUNTER — OFFICE VISIT (OUTPATIENT)
Dept: FAMILY MEDICINE CLINIC | Facility: CLINIC | Age: 79
End: 2019-05-07
Payer: MEDICARE

## 2019-05-07 VITALS
HEIGHT: 62 IN | SYSTOLIC BLOOD PRESSURE: 138 MMHG | BODY MASS INDEX: 24.66 KG/M2 | HEART RATE: 84 BPM | DIASTOLIC BLOOD PRESSURE: 80 MMHG | WEIGHT: 134 LBS | TEMPERATURE: 100 F | RESPIRATION RATE: 20 BRPM

## 2019-05-07 DIAGNOSIS — R82.90 ABNORMAL URINE: ICD-10-CM

## 2019-05-07 DIAGNOSIS — R05.9 COUGH: ICD-10-CM

## 2019-05-07 DIAGNOSIS — R50.9 FEVER, UNSPECIFIED FEVER CAUSE: ICD-10-CM

## 2019-05-07 DIAGNOSIS — J06.9 URI WITH COUGH AND CONGESTION: Primary | ICD-10-CM

## 2019-05-07 PROCEDURE — 87081 CULTURE SCREEN ONLY: CPT | Performed by: NURSE PRACTITIONER

## 2019-05-07 PROCEDURE — 99214 OFFICE O/P EST MOD 30 MIN: CPT | Performed by: NURSE PRACTITIONER

## 2019-05-07 PROCEDURE — 81003 URINALYSIS AUTO W/O SCOPE: CPT | Performed by: NURSE PRACTITIONER

## 2019-05-07 PROCEDURE — 87086 URINE CULTURE/COLONY COUNT: CPT | Performed by: NURSE PRACTITIONER

## 2019-05-07 PROCEDURE — 71046 X-RAY EXAM CHEST 2 VIEWS: CPT | Performed by: NURSE PRACTITIONER

## 2019-05-07 PROCEDURE — 87880 STREP A ASSAY W/OPTIC: CPT | Performed by: NURSE PRACTITIONER

## 2019-05-07 RX ORDER — BENZONATATE 100 MG/1
100 CAPSULE ORAL 3 TIMES DAILY PRN
Qty: 12 CAPSULE | Refills: 0 | Status: SHIPPED | OUTPATIENT
Start: 2019-05-07 | End: 2019-05-17

## 2019-05-07 RX ORDER — METHYLPREDNISOLONE 4 MG/1
TABLET ORAL
Qty: 1 KIT | Refills: 0 | Status: SHIPPED | OUTPATIENT
Start: 2019-05-07 | End: 2019-05-17

## 2019-05-07 NOTE — PROGRESS NOTES
Murali Siani is a 70year old female. HPI:   Patient presents today reporting a productive cough, sore throat (feels like it is on \"fire\" at times), sinus congestion, fatigue and fever (100.1 F currently).  She reports that she is also losing her voic 3 times a week  Disp:  Rfl:       Past Medical History:   Diagnosis Date   • Anxiety state, unspecified 1995 and 1993   • Dizziness and giddiness    • Dysfunction of eustachian tube    • Dyspepsia and other specified disorders of function of stomach    • D auscultation--right base diminished-- no crackles, wheezing or rales. CARDIO: RRR without murmur  GI: abdomen is soft, non-tender, bowel sounds x4.   Neurological: nerves II through XII grossly intact no sensorimotor deficit  Psychological: Mood and affect agrees to the plan. The patient is asked to return if symptoms worsen or fail to improve.

## 2019-05-09 ENCOUNTER — TELEPHONE (OUTPATIENT)
Dept: FAMILY MEDICINE CLINIC | Facility: CLINIC | Age: 79
End: 2019-05-09

## 2019-05-09 NOTE — TELEPHONE ENCOUNTER
Update-Pt c/o fatigue and nasal drainage, cough and ST are a little better. Pt Denies any SOB and Fever. Pt is requesting a Zpak. States it helps with her nasal drainage. Please advise.

## 2019-05-09 NOTE — TELEPHONE ENCOUNTER
Pt calling stating that she is still not feeling better and she was wondering if a prescription for a Z-gissel could be called in. Please advise.

## 2019-05-10 RX ORDER — AZITHROMYCIN 250 MG/1
TABLET, FILM COATED ORAL
Qty: 6 TABLET | Refills: 0 | Status: SHIPPED | OUTPATIENT
Start: 2019-05-10 | End: 2019-05-17

## 2019-05-16 ENCOUNTER — TELEPHONE (OUTPATIENT)
Dept: FAMILY MEDICINE CLINIC | Facility: CLINIC | Age: 79
End: 2019-05-16

## 2019-05-16 NOTE — TELEPHONE ENCOUNTER
Pt calling w/condition update. Throat has improved (not completely), nasal congestion continues; still has lots of yellow/green mucous, fever gone, temp about 96-97. Voice is better but still weak, fatigue continues.     On May 11 her eyes looked red an

## 2019-05-17 ENCOUNTER — OFFICE VISIT (OUTPATIENT)
Dept: FAMILY MEDICINE CLINIC | Facility: CLINIC | Age: 79
End: 2019-05-17
Payer: MEDICARE

## 2019-05-17 VITALS
HEART RATE: 80 BPM | SYSTOLIC BLOOD PRESSURE: 120 MMHG | WEIGHT: 129 LBS | DIASTOLIC BLOOD PRESSURE: 64 MMHG | HEIGHT: 62 IN | RESPIRATION RATE: 18 BRPM | BODY MASS INDEX: 23.74 KG/M2 | TEMPERATURE: 99 F

## 2019-05-17 DIAGNOSIS — R05.9 COUGH: Primary | ICD-10-CM

## 2019-05-17 DIAGNOSIS — J30.9 ALLERGIC RHINITIS, UNSPECIFIED SEASONALITY, UNSPECIFIED TRIGGER: ICD-10-CM

## 2019-05-17 DIAGNOSIS — R23.8 VESICULAR RASH: ICD-10-CM

## 2019-05-17 PROCEDURE — 99214 OFFICE O/P EST MOD 30 MIN: CPT | Performed by: NURSE PRACTITIONER

## 2019-05-17 PROCEDURE — 87798 DETECT AGENT NOS DNA AMP: CPT | Performed by: NURSE PRACTITIONER

## 2019-05-17 RX ORDER — VALACYCLOVIR HYDROCHLORIDE 1 G/1
1 TABLET, FILM COATED ORAL EVERY 8 HOURS
Qty: 21 TABLET | Refills: 0 | Status: SHIPPED | OUTPATIENT
Start: 2019-05-17 | End: 2019-05-24

## 2019-05-17 RX ORDER — GUAIFENESIN 600 MG
1200 TABLET, EXTENDED RELEASE 12 HR ORAL 2 TIMES DAILY
COMMUNITY
End: 2019-06-25 | Stop reason: ALTCHOICE

## 2019-05-17 RX ORDER — LORATADINE 10 MG/1
CAPSULE, LIQUID FILLED ORAL
COMMUNITY
End: 2020-12-23 | Stop reason: ALTCHOICE

## 2019-05-17 NOTE — PROGRESS NOTES
Charles Villatoro is a 70year old female. HPI:   Patient presents today for a follow up on recent URI. Patient completed the course of Medrol and Azithromycin. She reports that her throat is feeling better and no longer sore.  She reports her voice is still WATER. SIT UPRIGHT FOR 30 MINUTES AFTER TAKING MEDICINE.  Disp: 12 tablet Rfl: 0   FAMOTIDINE 40 MG Oral Tab TAKE ONE TABLET BY MOUTH TWICE DAILY  (Patient taking differently: TAKE ONE TABLET BY MOUTH as needed) Disp: 60 tablet Rfl: 3   Levothyroxine Sodium 129 lb   LMP 01/01/1992   BMI 23.59 kg/m²   GENERAL: well developed, well nourished,in no apparent distress  SKIN: multiple mildly erythematous vesicular lesions noted to patient's right buttock.  I was able to unroof one lesion--clear fluid noted and a cul [id number]  Exp: 1/30/20  Dispense: 1 each; Refill: 0    2. Allergic rhinitis, unspecified seasonality, unspecified trigger  Continue Claritin. Continue Azelastine as ordered. 3. Vesicular rash  HSV 1/2 culture taken today.   Patient denies any pain, numbne

## 2019-05-24 ENCOUNTER — OFFICE VISIT (OUTPATIENT)
Dept: FAMILY MEDICINE CLINIC | Facility: CLINIC | Age: 79
End: 2019-05-24
Payer: MEDICARE

## 2019-05-24 VITALS
BODY MASS INDEX: 23.74 KG/M2 | TEMPERATURE: 99 F | WEIGHT: 129 LBS | RESPIRATION RATE: 18 BRPM | SYSTOLIC BLOOD PRESSURE: 124 MMHG | HEIGHT: 62 IN | HEART RATE: 84 BPM | DIASTOLIC BLOOD PRESSURE: 72 MMHG

## 2019-05-24 DIAGNOSIS — R05.9 COUGH: Primary | ICD-10-CM

## 2019-05-24 DIAGNOSIS — B00.9 HSV-2 INFECTION: ICD-10-CM

## 2019-05-24 DIAGNOSIS — J30.9 ALLERGIC RHINITIS, UNSPECIFIED SEASONALITY, UNSPECIFIED TRIGGER: ICD-10-CM

## 2019-05-24 PROCEDURE — 99213 OFFICE O/P EST LOW 20 MIN: CPT | Performed by: NURSE PRACTITIONER

## 2019-05-24 NOTE — PATIENT INSTRUCTIONS
Herpes Simplex Virus Culture and Typing  Does this test have other names? Viral isolation  What is this test?  This test looks for which type of herpes simplex virus (HSV) is causing your infection.   HSV is a common virus that comes in two types: HSV1 a · Antibodies against HSV in your blood  If your healthcare provider suspects that your brain has been affected by the infection, he or she may order a viral DNA test of your cerebrospinal fluid, the fluid around your brain and spinal cord.   What do my test

## 2019-05-24 NOTE — PROGRESS NOTES
Tracee Seo is a 70year old female. HPI:   Patient presents today for a follow up on her right buttock vesicular rash. A culture was completed last week and HSV-2 noted. She reports that she will finish the Valtrex today.  She reports the rash has got tablet by mouth daily. Disp:  Rfl:    Atorvastatin Calcium 10 MG Oral Tab Take 10 mg by mouth nightly.  Takes 3 times a week  Disp:  Rfl:       Past Medical History:   Diagnosis Date   • Anxiety state, unspecified 1995 and 1993   • Dizziness and giddiness PERRLA, EOM intact, sclera clear without injection  NECK: supple,no adenopathy  LUNGS: clear to auscultation no rales, rhonchi or wheezes  CARDIO: RRR without murmur  Neurological: nerves II through XII grossly intact no sensorimotor deficit  Psychological

## 2019-06-14 ENCOUNTER — LAB ENCOUNTER (OUTPATIENT)
Dept: LAB | Facility: HOSPITAL | Age: 79
End: 2019-06-14
Attending: OTOLARYNGOLOGY
Payer: MEDICARE

## 2019-06-14 DIAGNOSIS — E04.1 THYROID NODULE: ICD-10-CM

## 2019-06-14 DIAGNOSIS — E07.9 THYROID DISORDER: ICD-10-CM

## 2019-06-14 PROCEDURE — 84439 ASSAY OF FREE THYROXINE: CPT

## 2019-06-14 PROCEDURE — 84443 ASSAY THYROID STIM HORMONE: CPT

## 2019-06-14 PROCEDURE — 36415 COLL VENOUS BLD VENIPUNCTURE: CPT

## 2019-06-18 ENCOUNTER — HOSPITAL ENCOUNTER (OUTPATIENT)
Dept: ULTRASOUND IMAGING | Facility: HOSPITAL | Age: 79
Discharge: HOME | End: 2019-06-18
Attending: OTOLARYNGOLOGY
Payer: MEDICARE

## 2019-06-18 DIAGNOSIS — E04.1 THYROID NODULE: ICD-10-CM

## 2019-06-18 PROCEDURE — 76536 US EXAM OF HEAD AND NECK: CPT | Performed by: OTOLARYNGOLOGY

## 2019-06-21 RX ORDER — FAMOTIDINE 40 MG/1
TABLET, FILM COATED ORAL
Qty: 30 TABLET | Refills: 0 | Status: SHIPPED | OUTPATIENT
Start: 2019-06-21 | End: 2019-07-19

## 2019-06-25 ENCOUNTER — OFFICE VISIT (OUTPATIENT)
Dept: FAMILY MEDICINE CLINIC | Facility: CLINIC | Age: 79
End: 2019-06-25
Payer: MEDICARE

## 2019-06-25 VITALS
RESPIRATION RATE: 20 BRPM | DIASTOLIC BLOOD PRESSURE: 68 MMHG | BODY MASS INDEX: 23.92 KG/M2 | SYSTOLIC BLOOD PRESSURE: 118 MMHG | HEART RATE: 80 BPM | WEIGHT: 130 LBS | TEMPERATURE: 99 F | HEIGHT: 62 IN

## 2019-06-25 DIAGNOSIS — I83.93 VARICOSE VEINS OF BOTH LOWER EXTREMITIES, UNSPECIFIED WHETHER COMPLICATED: ICD-10-CM

## 2019-06-25 DIAGNOSIS — E78.2 MIXED HYPERLIPIDEMIA: ICD-10-CM

## 2019-06-25 DIAGNOSIS — E78.5 DYSLIPIDEMIA: ICD-10-CM

## 2019-06-25 DIAGNOSIS — H26.9 CATARACT OF BOTH EYES, UNSPECIFIED CATARACT TYPE: ICD-10-CM

## 2019-06-25 DIAGNOSIS — E78.00 PURE HYPERCHOLESTEROLEMIA: ICD-10-CM

## 2019-06-25 DIAGNOSIS — K64.8 INTERNAL AND EXTERNAL PROLAPSED HEMORRHOIDS: ICD-10-CM

## 2019-06-25 DIAGNOSIS — K21.9 GASTROESOPHAGEAL REFLUX DISEASE, ESOPHAGITIS PRESENCE NOT SPECIFIED: ICD-10-CM

## 2019-06-25 DIAGNOSIS — R79.89 ABNORMAL CBC: ICD-10-CM

## 2019-06-25 DIAGNOSIS — B00.9 HSV-2 INFECTION: ICD-10-CM

## 2019-06-25 DIAGNOSIS — K44.9 HIATAL HERNIA: ICD-10-CM

## 2019-06-25 DIAGNOSIS — Z13.31 DEPRESSION SCREENING: ICD-10-CM

## 2019-06-25 DIAGNOSIS — I10 ESSENTIAL HYPERTENSION: ICD-10-CM

## 2019-06-25 DIAGNOSIS — K57.30 DIVERTICULOSIS OF LARGE INTESTINE WITHOUT HEMORRHAGE: ICD-10-CM

## 2019-06-25 DIAGNOSIS — E03.9 ACQUIRED HYPOTHYROIDISM: ICD-10-CM

## 2019-06-25 DIAGNOSIS — F40.01 AGORAPHOBIA WITH PANIC DISORDER: ICD-10-CM

## 2019-06-25 DIAGNOSIS — J30.9 ALLERGIC RHINITIS, UNSPECIFIED SEASONALITY, UNSPECIFIED TRIGGER: ICD-10-CM

## 2019-06-25 DIAGNOSIS — N81.11 CYSTOCELE, MIDLINE: ICD-10-CM

## 2019-06-25 DIAGNOSIS — L30.9 ECZEMA, UNSPECIFIED TYPE: ICD-10-CM

## 2019-06-25 DIAGNOSIS — E53.8 VITAMIN B12 DEFICIENCY: ICD-10-CM

## 2019-06-25 DIAGNOSIS — E04.2 MULTIPLE THYROID NODULES: ICD-10-CM

## 2019-06-25 DIAGNOSIS — G47.33 OSA (OBSTRUCTIVE SLEEP APNEA): ICD-10-CM

## 2019-06-25 DIAGNOSIS — E55.9 VITAMIN D DEFICIENCY: ICD-10-CM

## 2019-06-25 DIAGNOSIS — Z00.00 ENCOUNTER FOR ANNUAL HEALTH EXAMINATION: Primary | ICD-10-CM

## 2019-06-25 DIAGNOSIS — M81.6 LOCALIZED OSTEOPOROSIS, UNSPECIFIED PATHOLOGICAL FRACTURE PRESENCE: ICD-10-CM

## 2019-06-25 PROCEDURE — 99214 OFFICE O/P EST MOD 30 MIN: CPT | Performed by: FAMILY MEDICINE

## 2019-06-25 PROCEDURE — G0439 PPPS, SUBSEQ VISIT: HCPCS | Performed by: FAMILY MEDICINE

## 2019-06-25 PROCEDURE — G0444 DEPRESSION SCREEN ANNUAL: HCPCS | Performed by: FAMILY MEDICINE

## 2019-06-25 NOTE — PATIENT INSTRUCTIONS
Minerva Hameed's SCREENING SCHEDULE   Tests on this list are recommended by your physician but may not be covered, or covered at this frequency, by your insurer. Please check with your insurance carrier before scheduling to verify coverage.    PREVENTATI 73-68 years old and have smoked more than 100 cigarettes in their lifetime   • Anyone with a family history    Colorectal Cancer Screening  Covered up to Age 76     Colonoscopy Screen   Covered every 10 years- more often if abnormal Colonoscopy due on 10/0 Orders placed or performed in visit on 10/27/16   • FLU VACC [address] ANTIG   Orders placed or performed in visit on 10/27/15   • FLU VACC [address] ANTIG   Orders placed or performed in visit on 10/24/14   • INFLUENZA VIRUS VACCINE, >=3 YEARS OF Health. This site has a lot of good information including definitions of the different types of Advance Directives.  It also has the State forms available on it's website for anyone to review and print using their home computer and printer. (the forms are a

## 2019-06-25 NOTE — PROGRESS NOTES
HPI:   Marie Wolfe is a 70year old female who presents for a Medicare Subsequent Annual Wellness visit (Pt already had Initial Annual Wellness). Pt. Is here for AWV. Pt. States her last dental exam was 4 months ago.   Last eye exam -- 11/18   See on file in [place].    Advance care planning including the explanation and discussion of advance directives standard forms performed Face to Face with patient and Family/surrogate (if present), and forms available to patient in AVS         She has never smoked HGB 14.1 07/17/2018    .0 07/17/2018        ALLERGIES:   She is allergic to sulfa antibiotics; allergy; ciprofloxacin; contrast dye [gadolinium derivatives]; doxycycline; lansoprazole; latex; metoprolol tartrate; mushrooms; niacin; peanuts; penic gastroduodenostomy (11/22/2013); yobany needle localization w/ specimen 1 site left (1985); yobany needle localization w/ specimen 1 site left (1990); and colonoscopy (N/A, 10/4/2017).     Her family history includes Arthritis in her mother; Cancer in her brother ear canals, both ears   Nose: Nares normal, septum midline,mucosa normal, no drainage or sinus tenderness   Throat: Lips, mucosa, and tongue normal; teeth and gums normal   Neck: Supple, symmetrical, trachea midline, no adenopathy;  thyroid: not enlarged, 25-HYDROXY; Future    Abnormal CBC  -     CBC WITH DIFFERENTIAL WITH PLATELET;  Future    Allergic rhinitis, unspecified seasonality, unspecified trigger    HSV-2 infection    Acquired hypothyroidism    Gastroesophageal reflux disease, esophagitis presence (obstructive sleep apnea)  Stable; CPM    18. Varicose veins of both lower extremities, unspecified whether complicated  Stable; CPM    19. Internal and external prolapsed hemorrhoids  Stable; CPM    20. Multiple thyroid nodules  Stable; CPM    21.  Vitamin found. Fecal Occult Blood Annually No results found for: FOB No flowsheet data found. Glaucoma Screening      Ophthalmology Visit Annually: Diabetics, FHx Glaucoma, AA>50, > 65 No flowsheet data found.     Bone Density Screening      Dexascan FEMALE [24042]

## 2019-06-29 NOTE — PROGRESS NOTES
Received notification of unviewed test results. Informed pt of results and recommendations per KO, verb understanding.

## 2019-07-09 RX ORDER — ATORVASTATIN CALCIUM 10 MG/1
TABLET, FILM COATED ORAL
Qty: 12 TABLET | Refills: 5 | Status: SHIPPED | OUTPATIENT
Start: 2019-07-09 | End: 2020-05-15 | Stop reason: SDUPTHER

## 2019-07-19 RX ORDER — FAMOTIDINE 40 MG/1
TABLET, FILM COATED ORAL
Qty: 30 TABLET | Refills: 5 | Status: SHIPPED | OUTPATIENT
Start: 2019-07-19 | End: 2020-02-02 | Stop reason: SDUPTHER

## 2019-08-01 ENCOUNTER — OFFICE VISIT (OUTPATIENT)
Dept: CARDIOLOGY | Age: 79
End: 2019-08-01

## 2019-08-01 VITALS
SYSTOLIC BLOOD PRESSURE: 132 MMHG | DIASTOLIC BLOOD PRESSURE: 72 MMHG | BODY MASS INDEX: 24.11 KG/M2 | HEART RATE: 90 BPM | WEIGHT: 131 LBS | HEIGHT: 62 IN

## 2019-08-01 DIAGNOSIS — I65.23 ASYMPTOMATIC BILATERAL CAROTID ARTERY STENOSIS: ICD-10-CM

## 2019-08-01 DIAGNOSIS — Z13.1 ENCOUNTER FOR SCREENING FOR DIABETES MELLITUS: Primary | ICD-10-CM

## 2019-08-01 DIAGNOSIS — I34.1 MITRAL VALVE PROLAPSE: ICD-10-CM

## 2019-08-01 DIAGNOSIS — E78.5 DYSLIPIDEMIA: ICD-10-CM

## 2019-08-01 DIAGNOSIS — Z87.898 HISTORY OF PALPITATIONS: ICD-10-CM

## 2019-08-01 DIAGNOSIS — I10 ESSENTIAL HYPERTENSION: ICD-10-CM

## 2019-08-01 DIAGNOSIS — I83.813 VARICOSE VEINS OF BILATERAL LOWER EXTREMITIES WITH PAIN: ICD-10-CM

## 2019-08-01 PROCEDURE — 99214 OFFICE O/P EST MOD 30 MIN: CPT | Performed by: INTERNAL MEDICINE

## 2019-08-01 PROCEDURE — 3075F SYST BP GE 130 - 139MM HG: CPT | Performed by: INTERNAL MEDICINE

## 2019-08-01 PROCEDURE — 3078F DIAST BP <80 MM HG: CPT | Performed by: INTERNAL MEDICINE

## 2019-08-01 RX ORDER — CLOBETASOL PROPIONATE 0.5 MG/G
OINTMENT TOPICAL
COMMUNITY
Start: 2019-03-22

## 2019-08-01 RX ORDER — MULTIVIT,IRON,MINERALS/LUTEIN
1 TABLET ORAL
COMMUNITY
End: 2020-10-02 | Stop reason: ALTCHOICE

## 2019-08-01 RX ORDER — ALENDRONATE SODIUM 70 MG/1
TABLET ORAL
COMMUNITY
Start: 2018-12-19 | End: 2020-10-02 | Stop reason: ALTCHOICE

## 2019-08-01 RX ORDER — FAMOTIDINE 40 MG/1
40 TABLET, FILM COATED ORAL
COMMUNITY
Start: 2017-08-17 | End: 2022-10-07 | Stop reason: DRUGHIGH

## 2019-08-01 RX ORDER — LEVOTHYROXINE SODIUM 0.03 MG/1
TABLET ORAL
COMMUNITY
Start: 2010-06-17

## 2019-08-01 RX ORDER — AZELASTINE HCL 205.5 UG/1
SPRAY NASAL
COMMUNITY
Start: 2019-05-13 | End: 2020-10-02 | Stop reason: ALTCHOICE

## 2019-08-01 RX ORDER — LORATADINE 10 MG/1
CAPSULE, LIQUID FILLED ORAL
COMMUNITY
End: 2020-10-02 | Stop reason: ALTCHOICE

## 2019-08-01 ASSESSMENT — ENCOUNTER SYMPTOMS
COUGH: 0
WEIGHT LOSS: 0
HEMATOCHEZIA: 0
FEVER: 0
WEIGHT GAIN: 0
CHILLS: 0
BRUISES/BLEEDS EASILY: 0
SUSPICIOUS LESIONS: 0
ALLERGIC/IMMUNOLOGIC COMMENTS: NO NEW FOOD ALLERGIES
HEMOPTYSIS: 0

## 2019-08-02 ENCOUNTER — LAB ENCOUNTER (OUTPATIENT)
Dept: LAB | Facility: HOSPITAL | Age: 79
End: 2019-08-02
Attending: CLINICAL NURSE SPECIALIST
Payer: MEDICARE

## 2019-08-02 DIAGNOSIS — Z13.1 SCREENING FOR DIABETES MELLITUS: Primary | ICD-10-CM

## 2019-08-02 DIAGNOSIS — E16.2 HYPOGLYCEMIA, UNSPECIFIED: ICD-10-CM

## 2019-08-02 LAB
ALBUMIN SERPL-MCNC: NORMAL G/DL
ALBUMIN/GLOB SERPL: NORMAL {RATIO}
ALP SERPL-CCNC: NORMAL U/L
ALT SERPL-CCNC: NORMAL U/L
ANION GAP SERPL CALC-SCNC: NORMAL MMOL/L
AST SERPL-CCNC: NORMAL U/L
BILIRUB SERPL-MCNC: NORMAL MG/DL
BUN SERPL-MCNC: NORMAL MG/DL
BUN/CREAT SERPL: NORMAL
CALCIUM SERPL-MCNC: NORMAL MG/DL
CHLORIDE SERPL-SCNC: NORMAL MMOL/L
CO2 SERPL-SCNC: NORMAL MMOL/L
CREAT SERPL-MCNC: NORMAL MG/DL
GLOBULIN SER-MCNC: NORMAL G/DL
GLUCOSE 1H P GLC SERPL-MCNC: 112 MG/DL
GLUCOSE BLD-MCNC: 86 MG/DL (ref 70–99)
GLUCOSE P FAST SERPL-MCNC: 92 MG/DL (ref 70–99)
GLUCOSE SERPL-MCNC: 92 MG/DL
LENGTH OF FAST TIME PATIENT: NORMAL H
POTASSIUM SERPL-SCNC: NORMAL MMOL/L
PROT SERPL-MCNC: NORMAL G/DL
SODIUM SERPL-SCNC: NORMAL MMOL/L

## 2019-08-02 PROCEDURE — 82962 GLUCOSE BLOOD TEST: CPT

## 2019-08-02 PROCEDURE — 82950 GLUCOSE TEST: CPT

## 2019-08-02 PROCEDURE — 82947 ASSAY GLUCOSE BLOOD QUANT: CPT

## 2019-08-02 PROCEDURE — 36415 COLL VENOUS BLD VENIPUNCTURE: CPT

## 2019-08-05 ENCOUNTER — MED REC SCAN ONLY (OUTPATIENT)
Dept: FAMILY MEDICINE CLINIC | Facility: CLINIC | Age: 79
End: 2019-08-05

## 2019-08-06 ENCOUNTER — CLINICAL ABSTRACT (OUTPATIENT)
Dept: CARDIOLOGY | Age: 79
End: 2019-08-06

## 2019-08-06 ENCOUNTER — TELEPHONE (OUTPATIENT)
Dept: CARDIOLOGY | Age: 79
End: 2019-08-06

## 2019-08-06 DIAGNOSIS — R42 DIZZINESS: Primary | ICD-10-CM

## 2019-08-10 ENCOUNTER — HOSPITAL ENCOUNTER (OUTPATIENT)
Dept: CV DIAGNOSTICS | Facility: HOSPITAL | Age: 79
Discharge: HOME | End: 2019-08-10
Attending: INTERNAL MEDICINE
Payer: MEDICARE

## 2019-08-10 DIAGNOSIS — R42 DIZZINESS: ICD-10-CM

## 2019-08-10 PROCEDURE — 93226 XTRNL ECG REC<48 HR SCAN A/R: CPT | Performed by: INTERNAL MEDICINE

## 2019-08-10 PROCEDURE — 93225 XTRNL ECG REC<48 HRS REC: CPT | Performed by: INTERNAL MEDICINE

## 2019-08-10 PROCEDURE — 93227 XTRNL ECG REC<48 HR R&I: CPT | Performed by: INTERNAL MEDICINE

## 2019-08-22 RX ORDER — ALENDRONATE SODIUM 70 MG/1
TABLET ORAL
Qty: 12 TABLET | Refills: 0 | Status: SHIPPED | OUTPATIENT
Start: 2019-08-22 | End: 2020-01-27

## 2019-08-26 ENCOUNTER — TELEPHONE (OUTPATIENT)
Dept: CARDIOLOGY | Age: 79
End: 2019-08-26

## 2019-08-27 ENCOUNTER — TELEPHONE (OUTPATIENT)
Dept: CARDIOLOGY | Age: 79
End: 2019-08-27

## 2019-09-18 ENCOUNTER — OFFICE VISIT (OUTPATIENT)
Dept: SLEEP CENTER | Age: 79
End: 2019-09-18
Attending: DENTIST
Payer: MEDICARE

## 2019-09-18 DIAGNOSIS — R06.81 APNEA: ICD-10-CM

## 2019-09-18 PROCEDURE — 95810 POLYSOM 6/> YRS 4/> PARAM: CPT

## 2019-09-26 NOTE — PROCEDURES
[address] [address],[facility] [address]       Accredited by the [facility] of Sleep Medicine (AASM)    PATIENT'S NAME:        Jenny Delma  ATTENDING PHYSICIAN:   Luis Daniel Barroso M.D.   REFERRING PHYSICIAN:     PATIENT ACCOUNT #: norms. Sleep onset latency was slightly decreased at 9.1 minutes. Wake after sleep onset was increased at 101.5 minutes. Sleep was fragmented by occasional arousals due to respiratory and idiopathic events.   The amount of stage 1, or light sleep, was se correlation is recommended. The patient will follow up with Dr. Andry Torres, [address] [place] [address], for further clinical decision making.     2.   The patient should attempt to avoid supine sleep given the significant worsening of the patient's respiratory events in this posit

## 2019-09-27 ENCOUNTER — LAB ENCOUNTER (OUTPATIENT)
Dept: LAB | Facility: HOSPITAL | Age: 79
End: 2019-09-27
Attending: FAMILY MEDICINE
Payer: MEDICARE

## 2019-09-27 DIAGNOSIS — I10 ESSENTIAL HYPERTENSION: ICD-10-CM

## 2019-09-27 DIAGNOSIS — R79.89 ABNORMAL CBC: ICD-10-CM

## 2019-09-27 DIAGNOSIS — E78.2 MIXED HYPERLIPIDEMIA: ICD-10-CM

## 2019-09-27 DIAGNOSIS — E55.9 VITAMIN D DEFICIENCY: ICD-10-CM

## 2019-09-27 LAB
ALBUMIN SERPL-MCNC: 3.6 G/DL (ref 3.4–5)
ALBUMIN/GLOB SERPL: 0.9 {RATIO} (ref 1–2)
ALP LIVER SERPL-CCNC: 80 U/L (ref 55–142)
ALT SERPL-CCNC: 18 U/L (ref 13–56)
ANION GAP SERPL CALC-SCNC: 4 MMOL/L (ref 0–18)
AST SERPL-CCNC: 17 U/L (ref 15–37)
BASOPHILS # BLD AUTO: 0.02 X10(3) UL (ref 0–0.2)
BASOPHILS NFR BLD AUTO: 0.4 %
BILIRUB SERPL-MCNC: 0.5 MG/DL (ref 0.1–2)
BUN BLD-MCNC: 17 MG/DL (ref 7–18)
BUN/CREAT SERPL: 19.8 (ref 10–20)
CALCIUM BLD-MCNC: 9.1 MG/DL (ref 8.5–10.1)
CHLORIDE SERPL-SCNC: 104 MMOL/L (ref 98–112)
CHOLEST SMN-MCNC: 157 MG/DL (ref ?–200)
CO2 SERPL-SCNC: 30 MMOL/L (ref 21–32)
CREAT BLD-MCNC: 0.86 MG/DL (ref 0.55–1.02)
DEPRECATED RDW RBC AUTO: 40.2 FL (ref 35.1–46.3)
EOSINOPHIL # BLD AUTO: 0.16 X10(3) UL (ref 0–0.7)
EOSINOPHIL NFR BLD AUTO: 2.9 %
ERYTHROCYTE [DISTWIDTH] IN BLOOD BY AUTOMATED COUNT: 12.4 % (ref 11–15)
GLOBULIN PLAS-MCNC: 3.8 G/DL (ref 2.8–4.4)
GLUCOSE BLD-MCNC: 85 MG/DL (ref 70–99)
HCT VFR BLD AUTO: 42 % (ref 35–48)
HDLC SERPL-MCNC: 55 MG/DL (ref 40–59)
HGB BLD-MCNC: 13.7 G/DL (ref 12–16)
IMM GRANULOCYTES # BLD AUTO: 0.02 X10(3) UL (ref 0–1)
IMM GRANULOCYTES NFR BLD: 0.4 %
LDLC SERPL CALC-MCNC: 75 MG/DL (ref ?–100)
LYMPHOCYTES # BLD AUTO: 1.5 X10(3) UL (ref 1–4)
LYMPHOCYTES NFR BLD AUTO: 26.8 %
M PROTEIN MFR SERPL ELPH: 7.4 G/DL (ref 6.4–8.2)
MCH RBC QN AUTO: 29.2 PG (ref 26–34)
MCHC RBC AUTO-ENTMCNC: 32.6 G/DL (ref 31–37)
MCV RBC AUTO: 89.6 FL (ref 80–100)
MONOCYTES # BLD AUTO: 0.57 X10(3) UL (ref 0.1–1)
MONOCYTES NFR BLD AUTO: 10.2 %
NEUTROPHILS # BLD AUTO: 3.32 X10 (3) UL (ref 1.5–7.7)
NEUTROPHILS # BLD AUTO: 3.32 X10(3) UL (ref 1.5–7.7)
NEUTROPHILS NFR BLD AUTO: 59.3 %
NONHDLC SERPL-MCNC: 102 MG/DL (ref ?–130)
OSMOLALITY SERPL CALC.SUM OF ELEC: 287 MOSM/KG (ref 275–295)
PLATELET # BLD AUTO: 277 10(3)UL (ref 150–450)
POTASSIUM SERPL-SCNC: 4.8 MMOL/L (ref 3.5–5.1)
RBC # BLD AUTO: 4.69 X10(6)UL (ref 3.8–5.3)
SODIUM SERPL-SCNC: 138 MMOL/L (ref 136–145)
TRIGL SERPL-MCNC: 133 MG/DL (ref 30–149)
VIT D+METAB SERPL-MCNC: 40.2 NG/ML (ref 30–100)
VLDLC SERPL CALC-MCNC: 27 MG/DL (ref 0–30)
WBC # BLD AUTO: 5.6 X10(3) UL (ref 4–11)

## 2019-09-27 PROCEDURE — 36415 COLL VENOUS BLD VENIPUNCTURE: CPT

## 2019-09-27 PROCEDURE — 82306 VITAMIN D 25 HYDROXY: CPT

## 2019-09-27 PROCEDURE — 80061 LIPID PANEL: CPT

## 2019-09-27 PROCEDURE — 80053 COMPREHEN METABOLIC PANEL: CPT

## 2019-09-27 PROCEDURE — 85025 COMPLETE CBC W/AUTO DIFF WBC: CPT

## 2019-10-01 ENCOUNTER — OFFICE VISIT (OUTPATIENT)
Dept: FAMILY MEDICINE CLINIC | Facility: CLINIC | Age: 79
End: 2019-10-01
Payer: MEDICARE

## 2019-10-01 VITALS
TEMPERATURE: 99 F | DIASTOLIC BLOOD PRESSURE: 84 MMHG | HEIGHT: 62 IN | BODY MASS INDEX: 24.66 KG/M2 | HEART RATE: 64 BPM | RESPIRATION RATE: 16 BRPM | WEIGHT: 134 LBS | SYSTOLIC BLOOD PRESSURE: 132 MMHG

## 2019-10-01 DIAGNOSIS — J06.9 UPPER RESPIRATORY INFECTION WITH COUGH AND CONGESTION: Primary | ICD-10-CM

## 2019-10-01 PROCEDURE — 99213 OFFICE O/P EST LOW 20 MIN: CPT | Performed by: NURSE PRACTITIONER

## 2019-10-01 RX ORDER — AZITHROMYCIN 250 MG/1
TABLET, FILM COATED ORAL
Qty: 6 TABLET | Refills: 0 | Status: SHIPPED | OUTPATIENT
Start: 2019-10-01 | End: 2020-01-27 | Stop reason: ALTCHOICE

## 2019-10-01 NOTE — PROGRESS NOTES
Beckie Pedraza is a 70year old female. HPI:   Patient presents today reporting a 2 week history of sinus congestion, cough- sometimes productive other times dry, on/off sore throat. She denies any sinus pressure.  She has had on/off fever's- T-Max 101 F- unspecified 1995 and 1993   • Dizziness and giddiness    • Dysfunction of eustachian tube    • Dyspepsia and other specified disorders of function of stomach    • Dysphagia, unspecified(787.20)    • Heartburn    • MVP (mitral valve prolapse)    • Obstructi Upper respiratory infection with cough and congestion  (primary encounter diagnosis)    No orders of the defined types were placed in this encounter.       Meds & Refills for this Visit:  Requested Prescriptions     Signed Prescriptions Disp Refills   •

## 2019-10-09 ENCOUNTER — IMMUNIZATION (OUTPATIENT)
Dept: FAMILY MEDICINE CLINIC | Facility: CLINIC | Age: 79
End: 2019-10-09
Payer: MEDICARE

## 2019-10-09 DIAGNOSIS — Z23 NEED FOR VACCINATION: ICD-10-CM

## 2019-10-09 PROCEDURE — 90662 IIV NO PRSV INCREASED AG IM: CPT | Performed by: FAMILY MEDICINE

## 2019-10-09 PROCEDURE — G0008 ADMIN INFLUENZA VIRUS VAC: HCPCS | Performed by: FAMILY MEDICINE

## 2020-01-27 ENCOUNTER — HOSPITAL ENCOUNTER (OUTPATIENT)
Dept: GENERAL RADIOLOGY | Age: 80
Discharge: HOME | End: 2020-01-27
Attending: NURSE PRACTITIONER
Payer: MEDICARE

## 2020-01-27 ENCOUNTER — OFFICE VISIT (OUTPATIENT)
Dept: FAMILY MEDICINE CLINIC | Facility: CLINIC | Age: 80
End: 2020-01-27
Payer: MEDICARE

## 2020-01-27 VITALS
WEIGHT: 133 LBS | HEART RATE: 88 BPM | TEMPERATURE: 98 F | DIASTOLIC BLOOD PRESSURE: 76 MMHG | OXYGEN SATURATION: 98 % | RESPIRATION RATE: 16 BRPM | BODY MASS INDEX: 24.48 KG/M2 | SYSTOLIC BLOOD PRESSURE: 128 MMHG | HEIGHT: 62 IN

## 2020-01-27 DIAGNOSIS — M54.42 ACUTE LEFT-SIDED LOW BACK PAIN WITH LEFT-SIDED SCIATICA: Primary | ICD-10-CM

## 2020-01-27 DIAGNOSIS — M54.42 ACUTE LEFT-SIDED LOW BACK PAIN WITH LEFT-SIDED SCIATICA: ICD-10-CM

## 2020-01-27 PROCEDURE — 99213 OFFICE O/P EST LOW 20 MIN: CPT | Performed by: NURSE PRACTITIONER

## 2020-01-27 PROCEDURE — 72110 X-RAY EXAM L-2 SPINE 4/>VWS: CPT | Performed by: NURSE PRACTITIONER

## 2020-01-27 NOTE — PROGRESS NOTES
Osmin Vinson is a 70year old female. HPI:   Patient presents today reporting left low back pain- will radiate down the left leg \"on occasion. \" Reports discomfort started after she went to a silver heels exercise class- was doing a lot of twisting mo week         Past Medical History:   Diagnosis Date   • Anxiety state, unspecified 1995 and 1993   • Dizziness and giddiness    • Dysfunction of eustachian tube    • Dyspepsia and other specified disorders of function of stomach    • Dysphagia, unspecified patient's spine or to her left or right lower back. No discomfort with bilateral straight leg raises. No discomfort with flexion, extension, internal/external rotation, adduction or abduction of bilateral lower extremities. No calf tenderness.  +2 patellar

## 2020-02-02 RX ORDER — FAMOTIDINE 20 MG/1
TABLET, FILM COATED ORAL
Qty: 180 TABLET | Refills: 0 | Status: SHIPPED | OUTPATIENT
Start: 2020-02-02 | End: 2020-12-23

## 2020-02-19 ENCOUNTER — TELEPHONE (OUTPATIENT)
Dept: CARDIOLOGY | Age: 80
End: 2020-02-19

## 2020-02-27 ENCOUNTER — APPOINTMENT (OUTPATIENT)
Dept: PHYSICAL THERAPY | Facility: HOSPITAL | Age: 80
End: 2020-02-27
Attending: NURSE PRACTITIONER
Payer: MEDICARE

## 2020-03-03 ENCOUNTER — OFFICE VISIT (OUTPATIENT)
Dept: PHYSICAL THERAPY | Facility: HOSPITAL | Age: 80
End: 2020-03-03
Attending: NURSE PRACTITIONER
Payer: MEDICARE

## 2020-03-03 PROCEDURE — 97110 THERAPEUTIC EXERCISES: CPT

## 2020-03-03 PROCEDURE — 97161 PT EVAL LOW COMPLEX 20 MIN: CPT

## 2020-03-05 ENCOUNTER — OFFICE VISIT (OUTPATIENT)
Dept: PHYSICAL THERAPY | Facility: HOSPITAL | Age: 80
End: 2020-03-05
Attending: NURSE PRACTITIONER
Payer: MEDICARE

## 2020-03-05 PROCEDURE — 97140 MANUAL THERAPY 1/> REGIONS: CPT

## 2020-03-05 PROCEDURE — 97110 THERAPEUTIC EXERCISES: CPT

## 2020-03-05 NOTE — PROGRESS NOTES
Dx: low back pain with sciatica (L>R)         Insurance (Authorized # of Visits):  Medicare (8 per POC)          Authorizing Physician: Dr. Edith García MD visit: none scheduled  Fall Risk: standard         Precautions: n/a             Subjective: Pt states TX#: 4/ Date:                 TX#: 5/ Date:    Tx#: 6/   5 min recum bike, level 3       15x lower trunk rotation  10x6\" DKTC red SB  15 marches with ta brace       Pt in R sidelying over foam cylinder: flex/rotate mob, pt tolerated well; L sided QL re

## 2020-03-10 ENCOUNTER — OFFICE VISIT (OUTPATIENT)
Dept: PHYSICAL THERAPY | Facility: HOSPITAL | Age: 80
End: 2020-03-10
Attending: NURSE PRACTITIONER
Payer: MEDICARE

## 2020-03-10 PROCEDURE — 97140 MANUAL THERAPY 1/> REGIONS: CPT

## 2020-03-10 PROCEDURE — 97110 THERAPEUTIC EXERCISES: CPT

## 2020-03-10 NOTE — PROGRESS NOTES
Dx: low back pain with sciatica (L>R)         Insurance (Authorized # of Visits):  Medicare (8 per POC)          Authorizing Physician: Dr. Klaudia Segovia  Next MD visit: none scheduled  Fall Risk: standard         Precautions: n/a             Subjective: Pt report Date:                 TX#: 5/ Date:    Tx#: 6/   5 min recum bike, level 3 5 min recum bike level 3      15x lower trunk rotation  10x6\" DKTC red SB  15 marches with ta brace 15x lower trunk rotation  10x6\" DKTC red SB  15 marches with ta brace      Pt in

## 2020-03-12 ENCOUNTER — OFFICE VISIT (OUTPATIENT)
Dept: PHYSICAL THERAPY | Facility: HOSPITAL | Age: 80
End: 2020-03-12
Attending: NURSE PRACTITIONER
Payer: MEDICARE

## 2020-03-12 PROCEDURE — 97140 MANUAL THERAPY 1/> REGIONS: CPT

## 2020-03-12 PROCEDURE — 97110 THERAPEUTIC EXERCISES: CPT

## 2020-03-12 NOTE — PROGRESS NOTES
Dx: low back pain with sciatica (L>R)         Insurance (Authorized # of Visits):  Medicare (8 per POC)          Authorizing Physician: Dr. Melinda Castelan  Next MD visit: none scheduled  Fall Risk: standard         Precautions: n/a             Subjective: Pt states 3/5/2020  TX#: 2/8 Date:3/10/2020                TX#: 3/8 Date: 3/12/2020              TX#: 4/8 Date:                 TX#: 5/ Date:    Tx#: 6/   5 min recum bike, level 3 5 min recum bike level 3 5 min recum level 4     15x lower trunk rotation  10x6\" DKTC

## 2020-03-16 ENCOUNTER — TELEPHONE (OUTPATIENT)
Dept: PHYSICAL THERAPY | Facility: HOSPITAL | Age: 80
End: 2020-03-16

## 2020-03-17 ENCOUNTER — APPOINTMENT (OUTPATIENT)
Dept: PHYSICAL THERAPY | Facility: HOSPITAL | Age: 80
End: 2020-03-17
Attending: NURSE PRACTITIONER
Payer: MEDICARE

## 2020-03-17 ENCOUNTER — TELEPHONE (OUTPATIENT)
Dept: PHYSICAL THERAPY | Facility: HOSPITAL | Age: 80
End: 2020-03-17

## 2020-03-17 NOTE — TELEPHONE ENCOUNTER
Contacted pt to discuss department's initiative to protect all non-essential and high risk patients from COVID-19 exposure. No answer so left VM. This PT is not primary PT so did not give any recommendations regarding HEP.  Explained that the clinic is canc

## 2020-03-19 ENCOUNTER — APPOINTMENT (OUTPATIENT)
Dept: PHYSICAL THERAPY | Facility: HOSPITAL | Age: 80
End: 2020-03-19
Attending: NURSE PRACTITIONER
Payer: MEDICARE

## 2020-03-24 ENCOUNTER — APPOINTMENT (OUTPATIENT)
Dept: PHYSICAL THERAPY | Facility: HOSPITAL | Age: 80
End: 2020-03-24
Attending: NURSE PRACTITIONER
Payer: MEDICARE

## 2020-03-26 ENCOUNTER — APPOINTMENT (OUTPATIENT)
Dept: PHYSICAL THERAPY | Facility: HOSPITAL | Age: 80
End: 2020-03-26
Attending: NURSE PRACTITIONER
Payer: MEDICARE

## 2020-05-04 ENCOUNTER — LAB ENCOUNTER (OUTPATIENT)
Dept: LAB | Facility: HOSPITAL | Age: 80
End: 2020-05-04
Attending: FAMILY MEDICINE
Payer: MEDICARE

## 2020-05-04 ENCOUNTER — TELEMEDICINE (OUTPATIENT)
Dept: FAMILY MEDICINE CLINIC | Facility: CLINIC | Age: 80
End: 2020-05-04

## 2020-05-04 DIAGNOSIS — R82.90 CLOUDY URINE: Primary | ICD-10-CM

## 2020-05-04 DIAGNOSIS — R30.0 DYSURIA: ICD-10-CM

## 2020-05-04 DIAGNOSIS — R82.90 CLOUDY URINE: ICD-10-CM

## 2020-05-04 PROBLEM — I83.813 VARICOSE VEINS OF BILATERAL LOWER EXTREMITIES WITH PAIN: Status: ACTIVE | Noted: 2017-09-05

## 2020-05-04 PROCEDURE — 81001 URINALYSIS AUTO W/SCOPE: CPT

## 2020-05-04 PROCEDURE — 99213 OFFICE O/P EST LOW 20 MIN: CPT | Performed by: FAMILY MEDICINE

## 2020-05-04 PROCEDURE — 87186 SC STD MICRODIL/AGAR DIL: CPT

## 2020-05-04 PROCEDURE — 87086 URINE CULTURE/COLONY COUNT: CPT

## 2020-05-04 PROCEDURE — 87088 URINE BACTERIA CULTURE: CPT

## 2020-05-04 NOTE — PROGRESS NOTES
Minerva Hameedverbally consents to a virtual check in-service on [unfilled]. Patient understands and accepts the financial responsibility for any deductible, coinsurance, and/or co-pays associated with the service. Pebbles Saleh is a 70year old female. cramps  Lansoprazole            PALPITATIONS  Latex                   RASH  Metoprolol Tartrate     UNKNOWN    Comment:POWD  Mushrooms                 Niacin                  FACE FLUSHING  Peanuts                     Comment:vomiting  Penicillin G 17 15 - 37 U/L    ALT 18 13 - 56 U/L    Alkaline Phosphatase 80 55 - 142 U/L    Bilirubin, Total 0.5 0.1 - 2.0 mg/dL    Total Protein 7.4 6.4 - 8.2 g/dL    Albumin 3.6 3.4 - 5.0 g/dL    Globulin  3.8 2.8 - 4.4 g/dL    A/G Ratio 0.9 (L) 1.0 - 2.0   LIPID PA diagnosis)  Dysuria    Orders Placed This Encounter      UA/M WITH CULTURE REFLEX [3020]      Meds & Refills for this Visit:  Requested Prescriptions      No prescriptions requested or ordered in this encounter       Imaging & Consults:  None     Push flui

## 2020-05-15 RX ORDER — ATORVASTATIN CALCIUM 10 MG/1
TABLET, FILM COATED ORAL
Qty: 12 TABLET | Refills: 5 | Status: SHIPPED | OUTPATIENT
Start: 2020-05-15 | End: 2020-05-26 | Stop reason: SDUPTHER

## 2020-05-28 RX ORDER — ATORVASTATIN CALCIUM 10 MG/1
TABLET, FILM COATED ORAL
Qty: 12 TABLET | Refills: 5 | Status: SHIPPED | OUTPATIENT
Start: 2020-05-28 | End: 2020-07-01 | Stop reason: SDUPTHER

## 2020-06-08 ENCOUNTER — APPOINTMENT (OUTPATIENT)
Dept: LAB | Facility: HOSPITAL | Age: 80
End: 2020-06-08
Attending: FAMILY MEDICINE
Payer: MEDICARE

## 2020-06-08 DIAGNOSIS — N30.01 ACUTE CYSTITIS WITH HEMATURIA: ICD-10-CM

## 2020-06-08 PROCEDURE — 81003 URINALYSIS AUTO W/O SCOPE: CPT

## 2020-07-01 ENCOUNTER — TELEPHONE (OUTPATIENT)
Dept: CARDIOLOGY | Age: 80
End: 2020-07-01

## 2020-07-01 RX ORDER — ATORVASTATIN CALCIUM 10 MG/1
TABLET, FILM COATED ORAL
Qty: 36 TABLET | Refills: 1 | Status: SHIPPED | OUTPATIENT
Start: 2020-07-01 | End: 2022-10-07 | Stop reason: SDUPTHER

## 2020-07-08 ENCOUNTER — TELEPHONE (OUTPATIENT)
Dept: FAMILY MEDICINE CLINIC | Facility: CLINIC | Age: 80
End: 2020-07-08

## 2020-07-08 ENCOUNTER — OFFICE VISIT (OUTPATIENT)
Dept: FAMILY MEDICINE CLINIC | Facility: CLINIC | Age: 80
End: 2020-07-08
Payer: MEDICARE

## 2020-07-08 VITALS
DIASTOLIC BLOOD PRESSURE: 68 MMHG | WEIGHT: 123 LBS | HEART RATE: 84 BPM | TEMPERATURE: 99 F | RESPIRATION RATE: 18 BRPM | HEIGHT: 62 IN | SYSTOLIC BLOOD PRESSURE: 118 MMHG | BODY MASS INDEX: 22.63 KG/M2

## 2020-07-08 DIAGNOSIS — Z78.0 MENOPAUSE: ICD-10-CM

## 2020-07-08 DIAGNOSIS — Z12.31 ENCOUNTER FOR SCREENING MAMMOGRAM FOR BREAST CANCER: ICD-10-CM

## 2020-07-08 DIAGNOSIS — K21.9 GASTROESOPHAGEAL REFLUX DISEASE, ESOPHAGITIS PRESENCE NOT SPECIFIED: ICD-10-CM

## 2020-07-08 DIAGNOSIS — I65.23 ASYMPTOMATIC BILATERAL CAROTID ARTERY STENOSIS: ICD-10-CM

## 2020-07-08 DIAGNOSIS — M81.6 LOCALIZED OSTEOPOROSIS, UNSPECIFIED PATHOLOGICAL FRACTURE PRESENCE: ICD-10-CM

## 2020-07-08 DIAGNOSIS — Z01.419 WELL WOMAN EXAM: ICD-10-CM

## 2020-07-08 DIAGNOSIS — L30.9 ECZEMA, UNSPECIFIED TYPE: ICD-10-CM

## 2020-07-08 DIAGNOSIS — E55.9 VITAMIN D DEFICIENCY: ICD-10-CM

## 2020-07-08 DIAGNOSIS — E04.2 MULTIPLE THYROID NODULES: ICD-10-CM

## 2020-07-08 DIAGNOSIS — Z00.00 ENCOUNTER FOR MEDICARE ANNUAL WELLNESS EXAM: ICD-10-CM

## 2020-07-08 DIAGNOSIS — F40.01 AGORAPHOBIA WITH PANIC DISORDER: ICD-10-CM

## 2020-07-08 DIAGNOSIS — K57.30 DIVERTICULOSIS OF LARGE INTESTINE WITHOUT HEMORRHAGE: ICD-10-CM

## 2020-07-08 DIAGNOSIS — I34.1 MITRAL VALVE PROLAPSE: ICD-10-CM

## 2020-07-08 DIAGNOSIS — K44.9 HIATAL HERNIA: ICD-10-CM

## 2020-07-08 DIAGNOSIS — Z13.31 DEPRESSION SCREENING: ICD-10-CM

## 2020-07-08 DIAGNOSIS — E03.9 ACQUIRED HYPOTHYROIDISM: ICD-10-CM

## 2020-07-08 DIAGNOSIS — K64.8 INTERNAL AND EXTERNAL HEMORRHOIDS WITHOUT COMPLICATION: ICD-10-CM

## 2020-07-08 DIAGNOSIS — R79.89 ABNORMAL CBC: ICD-10-CM

## 2020-07-08 DIAGNOSIS — B00.9 HSV-2 INFECTION: ICD-10-CM

## 2020-07-08 DIAGNOSIS — Z87.898 HISTORY OF PALPITATIONS: ICD-10-CM

## 2020-07-08 DIAGNOSIS — Z01.419 ENCOUNTER FOR ROUTINE GYNECOLOGICAL EXAMINATION WITH PAPANICOLAOU SMEAR OF CERVIX: ICD-10-CM

## 2020-07-08 DIAGNOSIS — N81.11 CYSTOCELE, MIDLINE: ICD-10-CM

## 2020-07-08 DIAGNOSIS — E53.8 VITAMIN B12 DEFICIENCY: ICD-10-CM

## 2020-07-08 DIAGNOSIS — H26.9 CATARACT OF BOTH EYES, UNSPECIFIED CATARACT TYPE: ICD-10-CM

## 2020-07-08 DIAGNOSIS — J30.9 ALLERGIC RHINITIS, UNSPECIFIED SEASONALITY, UNSPECIFIED TRIGGER: ICD-10-CM

## 2020-07-08 DIAGNOSIS — Z13.0 SCREENING FOR DEFICIENCY ANEMIA: ICD-10-CM

## 2020-07-08 DIAGNOSIS — G47.33 OSA (OBSTRUCTIVE SLEEP APNEA): ICD-10-CM

## 2020-07-08 DIAGNOSIS — K64.4 INTERNAL AND EXTERNAL HEMORRHOIDS WITHOUT COMPLICATION: ICD-10-CM

## 2020-07-08 DIAGNOSIS — Z00.00 ENCOUNTER FOR ANNUAL HEALTH EXAMINATION: Primary | ICD-10-CM

## 2020-07-08 DIAGNOSIS — E78.2 MIXED HYPERLIPIDEMIA: ICD-10-CM

## 2020-07-08 DIAGNOSIS — I83.93 VARICOSE VEINS OF BOTH LOWER EXTREMITIES, UNSPECIFIED WHETHER COMPLICATED: ICD-10-CM

## 2020-07-08 DIAGNOSIS — I10 ESSENTIAL HYPERTENSION: ICD-10-CM

## 2020-07-08 PROCEDURE — G0439 PPPS, SUBSEQ VISIT: HCPCS | Performed by: FAMILY MEDICINE

## 2020-07-08 PROCEDURE — G0101 CA SCREEN;PELVIC/BREAST EXAM: HCPCS | Performed by: FAMILY MEDICINE

## 2020-07-08 PROCEDURE — 88175 CYTOPATH C/V AUTO FLUID REDO: CPT | Performed by: FAMILY MEDICINE

## 2020-07-08 PROCEDURE — 99214 OFFICE O/P EST MOD 30 MIN: CPT | Performed by: FAMILY MEDICINE

## 2020-07-08 PROCEDURE — G0444 DEPRESSION SCREEN ANNUAL: HCPCS | Performed by: FAMILY MEDICINE

## 2020-07-08 NOTE — PROGRESS NOTES
HPI:   Kleber Strauss is a 70year old female who presents for a Medicare Subsequent Annual Wellness visit (Pt already had Initial Annual Wellness). PT. Is her for AWV and med check.   HTN -- stable; not currently on meds  Dyslipidemia -- stable on li planning including the explanation and discussion of advance directives standard forms performed Face to Face with patient and Family/surrogate (if present), and forms available to patient in AVS         She has never smoked tobacco.  Ms. Luis Robersongoldy does not c Encounters:  07/08/20 : 123 lb (55.8 kg)  01/27/20 : 133 lb (60.3 kg)  12/09/19 : 132 lb (59.9 kg)     Last Cholesterol Labs:   Lab Results   Component Value Date    CHOLEST 157 09/27/2019    HDL 55 09/27/2019    LDL 75 09/27/2019    TRIG 133 09/27/2019 disorder of thyroid, Unspecified sinusitis (chronic), and Unspecified urinary incontinence.     She  has a past surgical history that includes breast surgery procedure unlisted (1985 and 1991); colonoscopy (5/2/2007); upper gi endoscopy,exam (3/29/2010); na kg/m² as calculated from the following:    Height as of this encounter: 62\". Weight as of this encounter: 123 lb (55.8 kg).     Medicare Hearing Assessment  (Required for AWV/SWV)    Hearing Screening    Screening Method:  Finger Rub  Finger Rub Result: Free (02650) 10/09/2019   • FLUAD High Dose 65 yr and older (31926) 11/28/2017, 11/20/2018   • HIGH DOSE FLU 65 YRS AND OLDER PRSV FREE SINGLE D (75891) FLU CLINIC 10/27/2015, 10/27/2016   • Influenza 11/02/2011, 11/06/2012, 10/11/2013   • Pneumococcal (Pr exam    Depression screening  -     DEPRESSION SCREEN ANNUAL    Encounter for screening mammogram for breast cancer  -     [facility] EZRA 2D+3D SCREENING BILAT (CPT=77067/26310);  Future    Screening for deficiency anemia  -     CBC WITH DIFFERENTIAL WITH LANG stable; CPM    Varicose veins of both lower extremities, unspecified whether complicated  -- stable; CPM    Encounter for Medicare annual wellness exam  -- done today     Depression screening  -- done today  -     Sarai Armenta    Encounter for Fecal Occult Blood Annually No results found for: FOB No flowsheet data found. Glaucoma Screening      Ophthalmology Visit Annually: Diabetics, FHx Glaucoma, AA>50, > 65 No flowsheet data found.     Bone Density Screening      Dexascan Every t [63692]

## 2020-07-08 NOTE — PATIENT INSTRUCTIONS
Minerva Hameed's SCREENING SCHEDULE   Tests on this list are recommended by your physician but may not be covered, or covered at this frequency, by your insurer. Please check with your insurance carrier before scheduling to verify coverage.    PREVENTATI 73-68 years old and have smoked more than 100 cigarettes in their lifetime   • Anyone with a family history    Colorectal Cancer Screening  Covered up to Age 76     Colonoscopy Screen   Covered every 10 years- more often if abnormal Colonoscopy due on 10/0 Orders placed or performed in visit on 10/09/19   • FLU VACC HIGH DOSE PRSV FREE   Orders placed or performed in visit on 10/27/16   • FLU VACC [address] ANTIG   Orders placed or performed in visit on 10/27/15   • FLU VACC PRSV FREE INC ANTIG   Orders http://www. [place].state. [place]/public/books/advin.htm  A link to the [employer]. This site has a lot of good information including definitions of the different types of Advance Directives.  It also has the State forms available on it's webs

## 2020-08-20 ENCOUNTER — APPOINTMENT (OUTPATIENT)
Dept: CARDIOLOGY | Age: 80
End: 2020-08-20

## 2020-09-02 ENCOUNTER — HOSPITAL ENCOUNTER (OUTPATIENT)
Dept: BONE DENSITY | Age: 80
Discharge: HOME | End: 2020-09-02
Attending: FAMILY MEDICINE
Payer: MEDICARE

## 2020-09-02 ENCOUNTER — HOSPITAL ENCOUNTER (OUTPATIENT)
Dept: MAMMOGRAPHY | Age: 80
Discharge: HOME | End: 2020-09-02
Attending: FAMILY MEDICINE
Payer: MEDICARE

## 2020-09-02 DIAGNOSIS — M81.0 OSTEOPOROSIS: ICD-10-CM

## 2020-09-02 DIAGNOSIS — Z12.31 ENCOUNTER FOR SCREENING MAMMOGRAM FOR BREAST CANCER: ICD-10-CM

## 2020-09-02 PROCEDURE — 77063 BREAST TOMOSYNTHESIS BI: CPT | Performed by: FAMILY MEDICINE

## 2020-09-02 PROCEDURE — 77067 SCR MAMMO BI INCL CAD: CPT | Performed by: FAMILY MEDICINE

## 2020-09-02 PROCEDURE — 77080 DXA BONE DENSITY AXIAL: CPT | Performed by: FAMILY MEDICINE

## 2020-09-18 ENCOUNTER — HOSPITAL ENCOUNTER (OUTPATIENT)
Dept: ULTRASOUND IMAGING | Age: 80
Discharge: HOME | End: 2020-09-18
Attending: OTOLARYNGOLOGY
Payer: MEDICARE

## 2020-09-18 ENCOUNTER — HOSPITAL ENCOUNTER (OUTPATIENT)
Dept: ULTRASOUND IMAGING | Age: 80
Discharge: HOME | End: 2020-09-18
Attending: FAMILY MEDICINE
Payer: MEDICARE

## 2020-09-18 DIAGNOSIS — I65.23 ASYMPTOMATIC BILATERAL CAROTID ARTERY STENOSIS: ICD-10-CM

## 2020-09-18 DIAGNOSIS — E07.9 THYROID DYSFUNCTION: ICD-10-CM

## 2020-09-18 PROCEDURE — 76536 US EXAM OF HEAD AND NECK: CPT | Performed by: OTOLARYNGOLOGY

## 2020-09-18 PROCEDURE — 93880 EXTRACRANIAL BILAT STUDY: CPT | Performed by: FAMILY MEDICINE

## 2020-09-21 ENCOUNTER — APPOINTMENT (OUTPATIENT)
Dept: CARDIOLOGY | Age: 80
End: 2020-09-21

## 2020-09-23 NOTE — PROGRESS NOTES
Minerva, your thyroid ultrasound is showing the left thyroid nodule is slightly decreased in size along with a benign appearing lymph node that is also smaller. Please follow up as scheduled with  Dr Whit Sullivan to further discuss the next step.

## 2020-10-12 ENCOUNTER — IMMUNIZATION (OUTPATIENT)
Dept: FAMILY MEDICINE CLINIC | Facility: CLINIC | Age: 80
End: 2020-10-12
Payer: MEDICARE

## 2020-10-12 DIAGNOSIS — Z23 NEED FOR VACCINATION: ICD-10-CM

## 2020-10-12 PROCEDURE — G0008 ADMIN INFLUENZA VIRUS VAC: HCPCS | Performed by: FAMILY MEDICINE

## 2020-10-12 PROCEDURE — 90662 IIV NO PRSV INCREASED AG IM: CPT | Performed by: FAMILY MEDICINE

## 2020-10-14 ENCOUNTER — MED REC SCAN ONLY (OUTPATIENT)
Dept: FAMILY MEDICINE CLINIC | Facility: CLINIC | Age: 80
End: 2020-10-14

## 2020-10-22 ENCOUNTER — OFFICE VISIT (OUTPATIENT)
Dept: CARDIOLOGY | Age: 80
End: 2020-10-22

## 2020-10-22 VITALS
WEIGHT: 125 LBS | SYSTOLIC BLOOD PRESSURE: 128 MMHG | BODY MASS INDEX: 23 KG/M2 | HEART RATE: 74 BPM | HEIGHT: 62 IN | DIASTOLIC BLOOD PRESSURE: 82 MMHG

## 2020-10-22 DIAGNOSIS — I83.813 VARICOSE VEINS OF BILATERAL LOWER EXTREMITIES WITH PAIN: Primary | ICD-10-CM

## 2020-10-22 DIAGNOSIS — I34.1 MITRAL VALVE PROLAPSE: ICD-10-CM

## 2020-10-22 DIAGNOSIS — I65.23 ASYMPTOMATIC BILATERAL CAROTID ARTERY STENOSIS: ICD-10-CM

## 2020-10-22 DIAGNOSIS — I10 ESSENTIAL HYPERTENSION: ICD-10-CM

## 2020-10-22 PROCEDURE — 99214 OFFICE O/P EST MOD 30 MIN: CPT | Performed by: INTERNAL MEDICINE

## 2020-10-22 SDOH — HEALTH STABILITY: PHYSICAL HEALTH: ON AVERAGE, HOW MANY DAYS PER WEEK DO YOU ENGAGE IN MODERATE TO STRENUOUS EXERCISE (LIKE A BRISK WALK)?: 0 DAYS

## 2020-10-22 SDOH — HEALTH STABILITY: PHYSICAL HEALTH: ON AVERAGE, HOW MANY MINUTES DO YOU ENGAGE IN EXERCISE AT THIS LEVEL?: 0 MIN

## 2020-10-22 ASSESSMENT — PATIENT HEALTH QUESTIONNAIRE - PHQ9
SUM OF ALL RESPONSES TO PHQ9 QUESTIONS 1 AND 2: 0
SUM OF ALL RESPONSES TO PHQ9 QUESTIONS 1 AND 2: 0
1. LITTLE INTEREST OR PLEASURE IN DOING THINGS: NOT AT ALL
2. FEELING DOWN, DEPRESSED OR HOPELESS: NOT AT ALL
CLINICAL INTERPRETATION OF PHQ2 SCORE: NO FURTHER SCREENING NEEDED
CLINICAL INTERPRETATION OF PHQ9 SCORE: NO FURTHER SCREENING NEEDED

## 2020-10-22 ASSESSMENT — ENCOUNTER SYMPTOMS
CHILLS: 0
HEMATOCHEZIA: 0
WEIGHT GAIN: 0
HEMOPTYSIS: 0
ALLERGIC/IMMUNOLOGIC COMMENTS: NO NEW FOOD ALLERGIES
BRUISES/BLEEDS EASILY: 0
COUGH: 0
SUSPICIOUS LESIONS: 0
FEVER: 0
WEIGHT LOSS: 0

## 2020-10-26 ENCOUNTER — LAB ENCOUNTER (OUTPATIENT)
Dept: LAB | Age: 80
End: 2020-10-26
Attending: FAMILY MEDICINE
Payer: MEDICARE

## 2020-10-26 DIAGNOSIS — Z13.0 SCREENING FOR DEFICIENCY ANEMIA: ICD-10-CM

## 2020-10-26 DIAGNOSIS — E78.2 MIXED HYPERLIPIDEMIA: ICD-10-CM

## 2020-10-26 DIAGNOSIS — E07.9 THYROID DYSFUNCTION: ICD-10-CM

## 2020-10-26 DIAGNOSIS — E53.8 VITAMIN B12 DEFICIENCY: ICD-10-CM

## 2020-10-26 DIAGNOSIS — I10 ESSENTIAL HYPERTENSION: ICD-10-CM

## 2020-10-26 DIAGNOSIS — E55.9 VITAMIN D DEFICIENCY: ICD-10-CM

## 2020-10-26 PROCEDURE — 82607 VITAMIN B-12: CPT

## 2020-10-26 PROCEDURE — 84439 ASSAY OF FREE THYROXINE: CPT

## 2020-10-26 PROCEDURE — 84443 ASSAY THYROID STIM HORMONE: CPT

## 2020-10-26 PROCEDURE — 85025 COMPLETE CBC W/AUTO DIFF WBC: CPT

## 2020-10-26 PROCEDURE — 82306 VITAMIN D 25 HYDROXY: CPT

## 2020-10-26 PROCEDURE — 80053 COMPREHEN METABOLIC PANEL: CPT

## 2020-10-26 PROCEDURE — 36415 COLL VENOUS BLD VENIPUNCTURE: CPT

## 2020-10-26 PROCEDURE — 80061 LIPID PANEL: CPT

## 2020-11-10 ENCOUNTER — APPOINTMENT (OUTPATIENT)
Dept: GENERAL RADIOLOGY | Facility: HOSPITAL | Age: 80
End: 2020-11-10
Attending: EMERGENCY MEDICINE
Payer: MEDICARE

## 2020-11-10 ENCOUNTER — HOSPITAL ENCOUNTER (EMERGENCY)
Facility: HOSPITAL | Age: 80
Discharge: HOME | End: 2020-11-10
Attending: EMERGENCY MEDICINE
Payer: MEDICARE

## 2020-11-10 ENCOUNTER — TELEPHONE (OUTPATIENT)
Dept: FAMILY MEDICINE CLINIC | Facility: CLINIC | Age: 80
End: 2020-11-10

## 2020-11-10 VITALS
HEIGHT: 62 IN | RESPIRATION RATE: 18 BRPM | OXYGEN SATURATION: 98 % | DIASTOLIC BLOOD PRESSURE: 82 MMHG | WEIGHT: 124 LBS | TEMPERATURE: 97 F | BODY MASS INDEX: 22.82 KG/M2 | HEART RATE: 77 BPM | SYSTOLIC BLOOD PRESSURE: 154 MMHG

## 2020-11-10 DIAGNOSIS — S00.83XA CONTUSION OF FACE, INITIAL ENCOUNTER: Primary | ICD-10-CM

## 2020-11-10 DIAGNOSIS — S80.02XA CONTUSION OF LEFT KNEE, INITIAL ENCOUNTER: ICD-10-CM

## 2020-11-10 DIAGNOSIS — S09.93XA DENTAL TRAUMA, INITIAL ENCOUNTER: ICD-10-CM

## 2020-11-10 PROCEDURE — 99283 EMERGENCY DEPT VISIT LOW MDM: CPT

## 2020-11-10 PROCEDURE — 73560 X-RAY EXAM OF KNEE 1 OR 2: CPT | Performed by: EMERGENCY MEDICINE

## 2020-11-10 NOTE — TELEPHONE ENCOUNTER
Sunday patient was playing tennis when her R foot was forward, stepped on twig and lost her balance. Pt fell and her face/chin hit the pavement. She denies any loss of consciousness.  She states it took her <1 minute to stand up, despite not feeling she

## 2020-11-10 NOTE — TELEPHONE ENCOUNTER
I called pt she was notified she needs to go to the IC or the ER. Pt will go to the IC either in [facility] or [place]. Pt verbalized understanding.

## 2020-11-10 NOTE — TELEPHONE ENCOUNTER
1. What are your symptoms? Patient has bruise on abdomen right below belly button. Patient states yesterday was quite small. Patient states it is now about 2-3 inches by 2 to 3 inches. No nausea no vomiting no pain. Patient did not hit head.   Does h

## 2020-11-11 NOTE — ED INITIAL ASSESSMENT (HPI)
Pt reports falling on tennis court Sunday injurying lower lip and two front upper teeth. Denies LOC. Denies other injury. Told by pcp to be checked.

## 2020-11-11 NOTE — ED PROVIDER NOTES
Patient Seen in: [facility] Emergency Department      History   Patient presents with:  Trauma  Dental Problem  Laceration/Abrasion    Stated Complaint: fall on Sunday, injury to lip and front teeth. denies LOC or blood thinners.     HPI    [facility]   • GASTRODUODENOSTOMY  11/22/2013    +hiatal hernia   • GASTROSCOPY N/A 11/22/2013    Performed by Trey Leblanc MD at [facility] ENDOSCOPY   • JACKIE LOCALIZATION WIRE 1 SITE LEFT (CPT=19281)  1985   • JACKIE LOCALIZATION WIRE 1 SITE LEFT (CPT=19281) looseness. Mandible  Also displays no bony tenderness or looseness. Teeth otherwise come together normally. No tenderness of the facial bones. Nose: Nose normal.   Eyes: EOM are normal. Pupils are equal, round, and reactive to light.    Anterior c List

## 2020-12-23 ENCOUNTER — OFFICE VISIT (OUTPATIENT)
Dept: FAMILY MEDICINE CLINIC | Facility: CLINIC | Age: 80
End: 2020-12-23
Payer: MEDICARE

## 2020-12-23 VITALS
HEIGHT: 62 IN | DIASTOLIC BLOOD PRESSURE: 70 MMHG | WEIGHT: 130 LBS | TEMPERATURE: 97 F | HEART RATE: 84 BPM | SYSTOLIC BLOOD PRESSURE: 128 MMHG | BODY MASS INDEX: 23.92 KG/M2 | RESPIRATION RATE: 16 BRPM

## 2020-12-23 DIAGNOSIS — E53.8 VITAMIN B12 DEFICIENCY: ICD-10-CM
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
## 2020-12-23 DIAGNOSIS — E04.2 MULTIPLE THYROID NODULES: ICD-10-CM

## 2020-12-23 DIAGNOSIS — E55.9 VITAMIN D DEFICIENCY: ICD-10-CM

## 2020-12-23 DIAGNOSIS — K21.9 GASTROESOPHAGEAL REFLUX DISEASE, UNSPECIFIED WHETHER ESOPHAGITIS PRESENT: ICD-10-CM

## 2020-12-23 DIAGNOSIS — E03.9 ACQUIRED HYPOTHYROIDISM: ICD-10-CM

## 2020-12-23 DIAGNOSIS — J30.9 ALLERGIC RHINITIS, UNSPECIFIED SEASONALITY, UNSPECIFIED TRIGGER: ICD-10-CM

## 2020-12-23 DIAGNOSIS — Z79.899 MEDICATION MANAGEMENT: ICD-10-CM

## 2020-12-23 DIAGNOSIS — T14.8XXA HEMATOMA: ICD-10-CM

## 2020-12-23 DIAGNOSIS — Z71.85 VACCINE COUNSELING: ICD-10-CM

## 2020-12-23 DIAGNOSIS — I83.93 VARICOSE VEINS OF BOTH LOWER EXTREMITIES, UNSPECIFIED WHETHER COMPLICATED: ICD-10-CM

## 2020-12-23 DIAGNOSIS — G47.33 OSA (OBSTRUCTIVE SLEEP APNEA): ICD-10-CM

## 2020-12-23 DIAGNOSIS — M81.6 LOCALIZED OSTEOPOROSIS, UNSPECIFIED PATHOLOGICAL FRACTURE PRESENCE: ICD-10-CM

## 2020-12-23 DIAGNOSIS — K44.9 HIATAL HERNIA: ICD-10-CM

## 2020-12-23 DIAGNOSIS — E78.2 MIXED HYPERLIPIDEMIA: ICD-10-CM

## 2020-12-23 DIAGNOSIS — Z78.0 MENOPAUSE: ICD-10-CM

## 2020-12-23 DIAGNOSIS — I34.1 MITRAL VALVE PROLAPSE: ICD-10-CM

## 2020-12-23 DIAGNOSIS — I10 ESSENTIAL HYPERTENSION: Primary | ICD-10-CM

## 2020-12-23 PROCEDURE — 99214 OFFICE O/P EST MOD 30 MIN: CPT | Performed by: FAMILY MEDICINE

## 2020-12-23 RX ORDER — FAMOTIDINE 20 MG/1
20 TABLET ORAL DAILY PRN
Qty: 90 TABLET | Refills: 1 | Status: SHIPPED | OUTPATIENT
Start: 2020-12-23

## 2020-12-23 RX ORDER — LEVOTHYROXINE SODIUM 0.03 MG/1
25 TABLET ORAL
Qty: 90 TABLET | Refills: 1 | Status: SHIPPED | OUTPATIENT
Start: 2020-12-23 | End: 2021-06-27

## 2020-12-23 RX ORDER — ATORVASTATIN CALCIUM 10 MG/1
10 TABLET, FILM COATED ORAL
Qty: 36 TABLET | Refills: 1 | Status: SHIPPED | OUTPATIENT
Start: 2020-12-23 | End: 2021-03-23

## 2020-12-23 NOTE — PROGRESS NOTES
Nnamdi Lantigua is a 68year old female. HPI:   Pt. Is here for med check.   HTN -- stable; not currently on meds  Dyslipidemia -- stable on lipitor; no side effects  GERD -- stable on famotadine -- taking only 40 mg at night as needed and no side effects daily.            Sulfa Antibiotics       RASH, OTHER (SEE COMMENTS)    Comment:redness  Allergy                     Comment:Denied Adhesive Tape  Ciprofloxacin           RASH  Contrast Dye [Gadol*        Comment:Iodinated Contrast Media  Doxycycline ng/dL    TSH 2.690 0.358 - 3.740 mIU/mL   COMP METABOLIC PANEL (14)   Result Value Ref Range    Glucose 90 70 - 99 mg/dL    Sodium 137 136 - 145 mmol/L    Potassium 4.5 3.5 - 5.1 mmol/L    Chloride 104 98 - 112 mmol/L    CO2 31.0 21.0 - 32.0 mmol/L    Anio 61.1 %    Lymphocyte % 27.8 %    Monocyte % 9.1 %    Eosinophil % 1.1 %    Basophil % 0.6 %    Immature Granulocyte % 0.3 %       REVIEW OF SYSTEMS:   GENERAL: feels well otherwise  SKIN: denies any unusual skin lesions  EYES:denies blurred vision or doubl sequela    No orders of the defined types were placed in this encounter.       Meds & Refills for this Visit:  Requested Prescriptions     Signed Prescriptions Disp Refills   • Levothyroxine Sodium 25 MCG Oral Tab 90 tablet 1     Sig: Take 1 tablet (25 mcg

## 2020-12-29 ENCOUNTER — HOSPITAL ENCOUNTER (OUTPATIENT)
Dept: CV DIAGNOSTICS | Facility: HOSPITAL | Age: 80
Discharge: HOME | End: 2020-12-29
Attending: FAMILY MEDICINE
Payer: MEDICARE

## 2020-12-29 DIAGNOSIS — T14.8XXA HEMATOMA: ICD-10-CM

## 2020-12-29 DIAGNOSIS — E78.2 MIXED HYPERLIPIDEMIA: ICD-10-CM

## 2020-12-29 DIAGNOSIS — E03.9 ACQUIRED HYPOTHYROIDISM: ICD-10-CM

## 2020-12-29 DIAGNOSIS — I10 ESSENTIAL HYPERTENSION: ICD-10-CM

## 2020-12-29 DIAGNOSIS — I34.1 MITRAL VALVE PROLAPSE: ICD-10-CM

## 2020-12-29 DIAGNOSIS — G47.33 OSA (OBSTRUCTIVE SLEEP APNEA): ICD-10-CM

## 2020-12-29 PROCEDURE — 93306 TTE W/DOPPLER COMPLETE: CPT | Performed by: FAMILY MEDICINE

## 2021-03-12 DIAGNOSIS — Z23 NEED FOR VACCINATION: ICD-10-CM

## 2021-03-15 ENCOUNTER — IMMUNIZATION (OUTPATIENT)
Dept: LAB | Age: 81
End: 2021-03-15
Attending: HOSPITALIST
Payer: MEDICARE

## 2021-03-15 DIAGNOSIS — Z23 NEED FOR VACCINATION: Primary | ICD-10-CM

## 2021-03-15 PROCEDURE — 0001A SARSCOV2 VAC 30MCG/0.3ML IM: CPT

## 2021-04-05 ENCOUNTER — IMMUNIZATION (OUTPATIENT)
Dept: LAB | Age: 81
End: 2021-04-05
Attending: HOSPITALIST
Payer: MEDICARE

## 2021-04-05 DIAGNOSIS — Z23 NEED FOR VACCINATION: Primary | ICD-10-CM

## 2021-04-05 PROCEDURE — 0002A SARSCOV2 VAC 30MCG/0.3ML IM: CPT

## 2021-06-27 RX ORDER — LEVOTHYROXINE SODIUM 0.03 MG/1
25 TABLET ORAL
Qty: 90 TABLET | Refills: 1 | Status: SHIPPED | OUTPATIENT
Start: 2021-06-27 | End: 2021-12-21

## 2021-08-23 ENCOUNTER — OFFICE VISIT (OUTPATIENT)
Dept: FAMILY MEDICINE CLINIC | Facility: CLINIC | Age: 81
End: 2021-08-23
Payer: MEDICARE

## 2021-08-23 VITALS
WEIGHT: 130 LBS | RESPIRATION RATE: 16 BRPM | BODY MASS INDEX: 23.92 KG/M2 | HEIGHT: 62 IN | DIASTOLIC BLOOD PRESSURE: 78 MMHG | HEART RATE: 64 BPM | SYSTOLIC BLOOD PRESSURE: 134 MMHG

## 2021-08-23 DIAGNOSIS — I83.93 VARICOSE VEINS OF BOTH LOWER EXTREMITIES, UNSPECIFIED WHETHER COMPLICATED: ICD-10-CM

## 2021-08-23 DIAGNOSIS — M25.551 RIGHT HIP PAIN: ICD-10-CM
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
## 2021-08-23 DIAGNOSIS — M15.9 PRIMARY OSTEOARTHRITIS INVOLVING MULTIPLE JOINTS: ICD-10-CM

## 2021-08-23 DIAGNOSIS — R60.0 LEG EDEMA, RIGHT: ICD-10-CM

## 2021-08-23 PROCEDURE — 99214 OFFICE O/P EST MOD 30 MIN: CPT | Performed by: FAMILY MEDICINE

## 2021-08-23 NOTE — PROGRESS NOTES
Vilma Wong is a 80year old female. HPI:   Patient complains of a bug bite that was initially on her right medial ankle on August 10.   She also developed another bug bite on the posterior right calf and the more distal one on the posterior right calf PALPITATIONS  Latex                   RASH  Metoprolol Tartrate     UNKNOWN    Comment:POWD  Mushrooms                 Niacin                  FACE FLUSHING  Peanuts                     Comment:vomiting  Penicillin G            RASH  Pravastatin Calcium, Total 9.2 8.5 - 10.1 mg/dL    Calculated Osmolality 283 275 - 295 mOsm/kg    GFR, Non- 75 >=60    GFR, -American 87 >=60    AST 15 15 - 37 U/L    ALT 22 13 - 56 U/L    Alkaline Phosphatase 90 55 - 142 U/L    Bilirubin, Total denies back pain; right hip pain  EXTREMITIES:  No pain or numbness    EXAM:   /78   Pulse 64   Resp 16   Ht 5' 2\" (1.575 m)   Wt 130 lb (59 kg)   LMP 01/01/1992   BMI 23.78 kg/m²   GENERAL: well developed, well nourished,in no apparent distress  SK

## 2021-09-28 ENCOUNTER — OFFICE VISIT (OUTPATIENT)
Dept: FAMILY MEDICINE CLINIC | Facility: CLINIC | Age: 81
End: 2021-09-28
Payer: MEDICARE

## 2021-09-28 ENCOUNTER — TELEPHONE (OUTPATIENT)
Dept: FAMILY MEDICINE CLINIC | Facility: CLINIC | Age: 81
End: 2021-09-28

## 2021-09-28 VITALS
SYSTOLIC BLOOD PRESSURE: 126 MMHG | BODY MASS INDEX: 23.55 KG/M2 | WEIGHT: 128 LBS | HEART RATE: 84 BPM | HEIGHT: 62 IN | DIASTOLIC BLOOD PRESSURE: 74 MMHG | RESPIRATION RATE: 16 BRPM

## 2021-09-28 DIAGNOSIS — I34.1 MITRAL VALVE PROLAPSE: ICD-10-CM

## 2021-09-28 DIAGNOSIS — Z87.898 HISTORY OF PALPITATIONS: ICD-10-CM

## 2021-09-28 DIAGNOSIS — E04.2 MULTIPLE THYROID NODULES: ICD-10-CM

## 2021-09-28 DIAGNOSIS — K57.30 DIVERTICULOSIS OF LARGE INTESTINE WITHOUT HEMORRHAGE: ICD-10-CM

## 2021-09-28 DIAGNOSIS — M15.9 PRIMARY OSTEOARTHRITIS INVOLVING MULTIPLE JOINTS: ICD-10-CM

## 2021-09-28 DIAGNOSIS — Z13.31 DEPRESSION SCREENING: ICD-10-CM

## 2021-09-28 DIAGNOSIS — J30.9 ALLERGIC RHINITIS, UNSPECIFIED SEASONALITY, UNSPECIFIED TRIGGER: ICD-10-CM

## 2021-09-28 DIAGNOSIS — E55.9 VITAMIN D DEFICIENCY: ICD-10-CM

## 2021-09-28 DIAGNOSIS — R31.9 HEMATURIA, UNSPECIFIED TYPE: ICD-10-CM

## 2021-09-28 DIAGNOSIS — F40.01 AGORAPHOBIA WITH PANIC DISORDER: ICD-10-CM

## 2021-09-28 DIAGNOSIS — L30.9 ECZEMA, UNSPECIFIED TYPE: ICD-10-CM

## 2021-09-28 DIAGNOSIS — E78.2 MIXED HYPERLIPIDEMIA: ICD-10-CM

## 2021-09-28 DIAGNOSIS — K44.9 HIATAL HERNIA: ICD-10-CM

## 2021-09-28 DIAGNOSIS — I65.23 ASYMPTOMATIC BILATERAL CAROTID ARTERY STENOSIS: ICD-10-CM

## 2021-09-28 DIAGNOSIS — K21.9 GASTROESOPHAGEAL REFLUX DISEASE, UNSPECIFIED WHETHER ESOPHAGITIS PRESENT: ICD-10-CM

## 2021-09-28 DIAGNOSIS — K64.8 INTERNAL AND EXTERNAL HEMORRHOIDS WITHOUT COMPLICATION: ICD-10-CM

## 2021-09-28 DIAGNOSIS — N81.11 CYSTOCELE, MIDLINE: ICD-10-CM

## 2021-09-28 DIAGNOSIS — Z13.0 SCREENING FOR DEFICIENCY ANEMIA: ICD-10-CM

## 2021-09-28 DIAGNOSIS — K64.4 INTERNAL AND EXTERNAL HEMORRHOIDS WITHOUT COMPLICATION: ICD-10-CM

## 2021-09-28 DIAGNOSIS — E03.9 ACQUIRED HYPOTHYROIDISM: ICD-10-CM

## 2021-09-28 DIAGNOSIS — Z00.00 ENCOUNTER FOR ANNUAL HEALTH EXAMINATION: Primary | ICD-10-CM

## 2021-09-28 DIAGNOSIS — E53.8 VITAMIN B12 DEFICIENCY: ICD-10-CM

## 2021-09-28 DIAGNOSIS — Z78.0 MENOPAUSE: ICD-10-CM

## 2021-09-28 DIAGNOSIS — Z12.31 ENCOUNTER FOR SCREENING MAMMOGRAM FOR MALIGNANT NEOPLASM OF BREAST: ICD-10-CM

## 2021-09-28 DIAGNOSIS — G47.33 OSA (OBSTRUCTIVE SLEEP APNEA): ICD-10-CM

## 2021-09-28 DIAGNOSIS — I10 ESSENTIAL HYPERTENSION: ICD-10-CM

## 2021-09-28 DIAGNOSIS — H26.9 CATARACT OF BOTH EYES, UNSPECIFIED CATARACT TYPE: ICD-10-CM

## 2021-09-28 DIAGNOSIS — R82.998 PINK-COLORED URINE: Primary | ICD-10-CM

## 2021-09-28 DIAGNOSIS — M81.6 LOCALIZED OSTEOPOROSIS, UNSPECIFIED PATHOLOGICAL FRACTURE PRESENCE: ICD-10-CM

## 2021-09-28 DIAGNOSIS — B00.9 HSV-2 INFECTION: ICD-10-CM

## 2021-09-28 DIAGNOSIS — I83.93 VARICOSE VEINS OF BOTH LOWER EXTREMITIES, UNSPECIFIED WHETHER COMPLICATED: ICD-10-CM

## 2021-09-28 PROCEDURE — G0444 DEPRESSION SCREEN ANNUAL: HCPCS | Performed by: FAMILY MEDICINE

## 2021-09-28 PROCEDURE — 99214 OFFICE O/P EST MOD 30 MIN: CPT | Performed by: FAMILY MEDICINE

## 2021-09-28 PROCEDURE — G0439 PPPS, SUBSEQ VISIT: HCPCS | Performed by: FAMILY MEDICINE

## 2021-09-28 RX ORDER — ASPIRIN 325 MG
325 TABLET ORAL DAILY
COMMUNITY
End: 2022-01-22

## 2021-09-28 NOTE — PROGRESS NOTES
HPI:   Charles Villatoro is a 80year old female who presents for a Medicare Subsequent Annual Wellness visit (Pt already had Initial Annual Wellness). Pt. Is here for AWV and med check.   HTN -- stable; not currently on meds  Dyslipidemia -- stable on l the explanation and discussion of advance directives standard forms performed Face to Face with patient and Family/surrogate (if present), and forms available to patient in AVS       She has never smoked tobacco.    CAGE screening score of 0 on 9/28/2021, 10/26/2020    TSH 2.690 10/26/2020    CREATSERUM 0.76 10/26/2020    GLU 90 10/26/2020        CBC  (most recent labs)   Lab Results   Component Value Date    WBC 6.4 10/26/2020    HGB 14.3 10/26/2020    .0 10/26/2020        ALLERGIES:   She is allerg other surgical history (1/8/2010); tonsillectomy (1951); thyroidectomy; gastroduodenostomy (11/22/2013); yobany localization wire 1 site left (cpt=19281) (1985); yobany localization wire 1 site left (cpt=19281) (1990); and colonoscopy (N/A, 10/4/2017).     Her fa Head:  Normocephalic, without obvious abnormality, atraumatic   Eyes:  Conjunctiva/corneas clear, EOM's intact both eyes   Ears:  Normal TM's and external ear canals, both ears   Nose: WEARING MASK DUE TO COVID   Throat: WEARING MASK DUE TO COVID   Neck: Diagnoses and all orders for this visit:    Encounter for annual health examination  -- done today    Essential hypertension  -- stable; CPM  -     COMP METABOLIC PANEL (14); Future    Mixed hyperlipidemia  -- stable ; CPM  -     LIPID PANEL;  Future    V Future    Depression screening  -- done today  -     DEPRESSION SCREEN ANNUAL         Diet assessment: good     PLAN:  The patient indicates understanding of these issues and agrees to the plan. Reinforced healthy diet, lifestyle, and exercise.   1. Encoun Cancel: Urinalysis, Routine; Future; Expected date: 09/28/2021      Return in 6 months (on 3/28/2022).      Elaina Gomez DO, 9/28/2021     General Health     In the past six months, have you lost more than 10 pounds without trying?: 2 - No  Has your appeti of the following:    Colonoscopy   Covered every 10 years    Covered every 2 years if patient is at high risk or previous colonoscopy was abnormal 10/04/2017    Colonoscopy due on 10/04/2022    Flexible Sigmoidoscopy   Covered every 4 years -    Fecal Occu

## 2021-09-28 NOTE — TELEPHONE ENCOUNTER
Pt requesting urinalysis be added to her labs. States she very occasionally sees a very, very light tinge of pink in her urine. Please advise.

## 2021-09-28 NOTE — PATIENT INSTRUCTIONS
Minerva Hameed's SCREENING SCHEDULE   Tests on this list are recommended by your physician but may not be covered, or covered at this frequency, by your insurer. Please check with your insurance carrier before scheduling to verify coverage.    Archana Maxwell 09/02/2020      No recommendations at this time   Pap and Pelvic    Pap   Covered every 2 years for women at normal risk;  Annually if at high risk -  No recommendations at this time    Chlamydia Annually if high risk -  No recommendations at this time   [employer] Association regarding Advance Directives.

## 2021-09-29 ENCOUNTER — LAB ENCOUNTER (OUTPATIENT)
Dept: LAB | Age: 81
End: 2021-09-29
Attending: FAMILY MEDICINE
Payer: MEDICARE

## 2021-09-29 DIAGNOSIS — E55.9 VITAMIN D DEFICIENCY: ICD-10-CM

## 2021-09-29 DIAGNOSIS — Z13.0 SCREENING FOR DEFICIENCY ANEMIA: ICD-10-CM

## 2021-09-29 DIAGNOSIS — R82.998 PINK-COLORED URINE: ICD-10-CM

## 2021-09-29 DIAGNOSIS — E03.9 ACQUIRED HYPOTHYROIDISM: ICD-10-CM

## 2021-09-29 DIAGNOSIS — E53.8 VITAMIN B12 DEFICIENCY: ICD-10-CM

## 2021-09-29 DIAGNOSIS — E78.2 MIXED HYPERLIPIDEMIA: ICD-10-CM

## 2021-09-29 DIAGNOSIS — I10 ESSENTIAL HYPERTENSION: ICD-10-CM

## 2021-09-29 PROCEDURE — 84443 ASSAY THYROID STIM HORMONE: CPT

## 2021-09-29 PROCEDURE — 87186 SC STD MICRODIL/AGAR DIL: CPT

## 2021-09-29 PROCEDURE — 82607 VITAMIN B-12: CPT

## 2021-09-29 PROCEDURE — 80061 LIPID PANEL: CPT

## 2021-09-29 PROCEDURE — 80053 COMPREHEN METABOLIC PANEL: CPT

## 2021-09-29 PROCEDURE — 87077 CULTURE AEROBIC IDENTIFY: CPT

## 2021-09-29 PROCEDURE — 81001 URINALYSIS AUTO W/SCOPE: CPT

## 2021-09-29 PROCEDURE — 84439 ASSAY OF FREE THYROXINE: CPT

## 2021-09-29 PROCEDURE — 87086 URINE CULTURE/COLONY COUNT: CPT

## 2021-09-29 PROCEDURE — 36415 COLL VENOUS BLD VENIPUNCTURE: CPT

## 2021-09-29 PROCEDURE — 82306 VITAMIN D 25 HYDROXY: CPT

## 2021-09-29 PROCEDURE — 85025 COMPLETE CBC W/AUTO DIFF WBC: CPT

## 2021-10-01 DIAGNOSIS — N39.0 URINARY TRACT INFECTION WITHOUT HEMATURIA, SITE UNSPECIFIED: Primary | ICD-10-CM

## 2021-10-01 RX ORDER — NITROFURANTOIN 25; 75 MG/1; MG/1
100 CAPSULE ORAL 2 TIMES DAILY
Qty: 14 CAPSULE | Refills: 0 | Status: SHIPPED | OUTPATIENT
Start: 2021-10-01 | End: 2021-10-08

## 2021-10-14 ENCOUNTER — OFFICE VISIT (OUTPATIENT)
Dept: CARDIOLOGY | Age: 81
End: 2021-10-14

## 2021-10-14 VITALS
HEART RATE: 78 BPM | DIASTOLIC BLOOD PRESSURE: 93 MMHG | HEIGHT: 62 IN | WEIGHT: 129 LBS | BODY MASS INDEX: 23.74 KG/M2 | SYSTOLIC BLOOD PRESSURE: 156 MMHG

## 2021-10-14 DIAGNOSIS — I65.23 ASYMPTOMATIC BILATERAL CAROTID ARTERY STENOSIS: ICD-10-CM

## 2021-10-14 DIAGNOSIS — Z87.898 HISTORY OF PALPITATIONS: ICD-10-CM

## 2021-10-14 DIAGNOSIS — I34.1 MITRAL VALVE PROLAPSE: ICD-10-CM

## 2021-10-14 DIAGNOSIS — E78.5 DYSLIPIDEMIA: ICD-10-CM

## 2021-10-14 DIAGNOSIS — I83.813 VARICOSE VEINS OF BILATERAL LOWER EXTREMITIES WITH PAIN: Primary | ICD-10-CM

## 2021-10-14 DIAGNOSIS — I10 ESSENTIAL HYPERTENSION: ICD-10-CM

## 2021-10-14 PROCEDURE — 99214 OFFICE O/P EST MOD 30 MIN: CPT | Performed by: INTERNAL MEDICINE

## 2021-10-14 SDOH — HEALTH STABILITY: PHYSICAL HEALTH: ON AVERAGE, HOW MANY DAYS PER WEEK DO YOU ENGAGE IN MODERATE TO STRENUOUS EXERCISE (LIKE A BRISK WALK)?: 5 DAYS

## 2021-10-14 SDOH — HEALTH STABILITY: PHYSICAL HEALTH: ON AVERAGE, HOW MANY MINUTES DO YOU ENGAGE IN EXERCISE AT THIS LEVEL?: 10 MIN

## 2021-10-14 ASSESSMENT — PATIENT HEALTH QUESTIONNAIRE - PHQ9
CLINICAL INTERPRETATION OF PHQ2 SCORE: NO FURTHER SCREENING NEEDED
2. FEELING DOWN, DEPRESSED OR HOPELESS: NOT AT ALL
1. LITTLE INTEREST OR PLEASURE IN DOING THINGS: NOT AT ALL
SUM OF ALL RESPONSES TO PHQ9 QUESTIONS 1 AND 2: 0
CLINICAL INTERPRETATION OF PHQ9 SCORE: NO FURTHER SCREENING NEEDED
SUM OF ALL RESPONSES TO PHQ9 QUESTIONS 1 AND 2: 0

## 2021-10-15 ENCOUNTER — LAB ENCOUNTER (OUTPATIENT)
Dept: LAB | Age: 81
End: 2021-10-15
Attending: FAMILY MEDICINE
Payer: MEDICARE

## 2021-10-15 DIAGNOSIS — N39.0 URINARY TRACT INFECTION WITHOUT HEMATURIA, SITE UNSPECIFIED: ICD-10-CM

## 2021-10-15 PROCEDURE — 87086 URINE CULTURE/COLONY COUNT: CPT

## 2021-10-21 RX ORDER — ATORVASTATIN CALCIUM 10 MG/1
10 TABLET, FILM COATED ORAL
Qty: 36 TABLET | Refills: 1 | Status: SHIPPED | OUTPATIENT
Start: 2021-10-22 | End: 2022-04-22

## 2021-10-25 ENCOUNTER — HOSPITAL ENCOUNTER (OUTPATIENT)
Dept: ULTRASOUND IMAGING | Age: 81
Discharge: HOME | End: 2021-10-25
Attending: OTOLARYNGOLOGY
Payer: MEDICARE

## 2021-10-25 ENCOUNTER — LAB ENCOUNTER (OUTPATIENT)
Dept: LAB | Age: 81
End: 2021-10-25
Attending: OTOLARYNGOLOGY
Payer: MEDICARE

## 2021-10-25 DIAGNOSIS — E04.1 THYROID NODULE: ICD-10-CM

## 2021-10-25 DIAGNOSIS — E07.9 THYROID DYSFUNCTION: ICD-10-CM

## 2021-10-25 DIAGNOSIS — E07.9 THYROID DISORDER: ICD-10-CM

## 2021-10-25 PROCEDURE — 36415 COLL VENOUS BLD VENIPUNCTURE: CPT

## 2021-10-25 PROCEDURE — 76536 US EXAM OF HEAD AND NECK: CPT | Performed by: OTOLARYNGOLOGY

## 2021-10-25 PROCEDURE — 84443 ASSAY THYROID STIM HORMONE: CPT

## 2021-10-25 PROCEDURE — 84439 ASSAY OF FREE THYROXINE: CPT

## 2021-10-27 NOTE — PROGRESS NOTES
Minerva, your thyroid ultrasound is showing left sided thyroid nodule is just slightly increased in size. Please keep your follow up with Dr Joe Duncan as scheduled to further discuss the next step.

## 2021-11-12 ENCOUNTER — NURSE ONLY (OUTPATIENT)
Dept: FAMILY MEDICINE CLINIC | Facility: CLINIC | Age: 81
End: 2021-11-12
Payer: MEDICARE

## 2021-11-12 DIAGNOSIS — Z23 NEED FOR VACCINATION: Primary | ICD-10-CM

## 2021-11-12 PROCEDURE — G0008 ADMIN INFLUENZA VIRUS VAC: HCPCS | Performed by: FAMILY MEDICINE

## 2021-11-12 PROCEDURE — 90662 IIV NO PRSV INCREASED AG IM: CPT | Performed by: FAMILY MEDICINE

## 2021-11-12 NOTE — PROGRESS NOTES
Patient received high dose flu vaccine on left deltoid. Answered questions about when to get booster and no reactions.

## 2021-12-13 ENCOUNTER — LAB ENCOUNTER (OUTPATIENT)
Dept: LAB | Facility: HOSPITAL | Age: 81
End: 2021-12-13
Attending: OTOLARYNGOLOGY
Payer: MEDICARE

## 2021-12-13 DIAGNOSIS — E07.9 THYROID DISORDER: ICD-10-CM

## 2021-12-13 DIAGNOSIS — Z01.818 PREOP EXAMINATION: ICD-10-CM

## 2021-12-13 DIAGNOSIS — Z11.59 ENCOUNTER FOR SCREENING FOR OTHER VIRAL DISEASES: ICD-10-CM

## 2021-12-13 PROCEDURE — 36415 COLL VENOUS BLD VENIPUNCTURE: CPT

## 2021-12-13 PROCEDURE — 84439 ASSAY OF FREE THYROXINE: CPT

## 2021-12-13 PROCEDURE — 84443 ASSAY THYROID STIM HORMONE: CPT

## 2021-12-16 ENCOUNTER — HOSPITAL ENCOUNTER (OUTPATIENT)
Dept: ULTRASOUND IMAGING | Facility: HOSPITAL | Age: 81
Discharge: HOME | End: 2021-12-16
Attending: OTOLARYNGOLOGY
Payer: MEDICARE

## 2021-12-16 DIAGNOSIS — E07.9 THYROID DYSFUNCTION: ICD-10-CM

## 2021-12-16 PROCEDURE — 10005 FNA BX W/US GDN 1ST LES: CPT | Performed by: OTOLARYNGOLOGY

## 2021-12-16 PROCEDURE — 88173 CYTOPATH EVAL FNA REPORT: CPT | Performed by: OTOLARYNGOLOGY

## 2021-12-21 RX ORDER — LEVOTHYROXINE SODIUM 0.03 MG/1
25 TABLET ORAL
Qty: 90 TABLET | Refills: 1 | Status: SHIPPED | OUTPATIENT
Start: 2021-12-21 | End: 2022-06-18

## 2022-01-01 ENCOUNTER — EXTERNAL RECORD (OUTPATIENT)
Dept: OTHER | Age: 82
End: 2022-01-01

## 2022-01-10 ENCOUNTER — HOSPITAL ENCOUNTER (OUTPATIENT)
Dept: MAMMOGRAPHY | Age: 82
Discharge: HOME | End: 2022-01-10
Attending: FAMILY MEDICINE
Payer: MEDICARE

## 2022-01-10 DIAGNOSIS — Z12.31 ENCOUNTER FOR SCREENING MAMMOGRAM FOR MALIGNANT NEOPLASM OF BREAST: ICD-10-CM

## 2022-01-10 PROCEDURE — 77067 SCR MAMMO BI INCL CAD: CPT | Performed by: FAMILY MEDICINE

## 2022-01-10 PROCEDURE — 77063 BREAST TOMOSYNTHESIS BI: CPT | Performed by: FAMILY MEDICINE

## 2022-02-01 ENCOUNTER — TELEMEDICINE (OUTPATIENT)
Dept: FAMILY MEDICINE CLINIC | Facility: CLINIC | Age: 82
End: 2022-02-01
Payer: MEDICARE

## 2022-02-01 ENCOUNTER — LAB ENCOUNTER (OUTPATIENT)
Dept: LAB | Age: 82
End: 2022-02-01
Attending: STUDENT IN AN ORGANIZED HEALTH CARE EDUCATION/TRAINING PROGRAM
Payer: MEDICARE

## 2022-02-01 DIAGNOSIS — J06.9 VIRAL URI: Primary | ICD-10-CM

## 2022-02-01 DIAGNOSIS — J06.9 VIRAL URI: ICD-10-CM

## 2022-02-01 PROCEDURE — 99212 OFFICE O/P EST SF 10 MIN: CPT | Performed by: STUDENT IN AN ORGANIZED HEALTH CARE EDUCATION/TRAINING PROGRAM

## 2022-02-02 LAB — SARS-COV-2 RNA RESP QL NAA+PROBE: NOT DETECTED

## 2022-02-07 ENCOUNTER — ORDER TRANSCRIPTION (OUTPATIENT)
Dept: ADMINISTRATIVE | Facility: HOSPITAL | Age: 82
End: 2022-02-07

## 2022-02-07 ENCOUNTER — LAB ENCOUNTER (OUTPATIENT)
Dept: LAB | Facility: HOSPITAL | Age: 82
End: 2022-02-07
Attending: INTERNAL MEDICINE
Payer: MEDICARE

## 2022-02-07 DIAGNOSIS — Z01.818 PRE-OP TESTING: ICD-10-CM

## 2022-02-07 DIAGNOSIS — Z11.59 ENCOUNTER FOR SCREENING FOR OTHER VIRAL DISEASES: ICD-10-CM

## 2022-02-08 LAB — SARS-COV-2 RNA RESP QL NAA+PROBE: NOT DETECTED

## 2022-02-10 ENCOUNTER — HOSPITAL ENCOUNTER (OUTPATIENT)
Dept: GENERAL RADIOLOGY | Age: 82
Discharge: HOME | End: 2022-02-10
Attending: INTERNAL MEDICINE
Payer: MEDICARE

## 2022-02-10 DIAGNOSIS — R10.13 EPIGASTRIC PAIN: ICD-10-CM

## 2022-02-10 DIAGNOSIS — R14.0 ABDOMINAL BLOATING: ICD-10-CM

## 2022-02-10 DIAGNOSIS — K44.9 HIATAL HERNIA: ICD-10-CM

## 2022-02-10 PROCEDURE — 74246 X-RAY XM UPR GI TRC 2CNTRST: CPT | Performed by: INTERNAL MEDICINE

## 2022-02-11 NOTE — PROGRESS NOTES
UGI shows esophageal dysmotility and a small hiatal hernia. She has no dysphagia or esophageal symptoms. I recommend continuing famotidine PRN and limiting foods that can cause bloating. Results sent to the patient.

## 2022-03-04 ENCOUNTER — EXTERNAL RECORD (OUTPATIENT)
Dept: HEALTH INFORMATION MANAGEMENT | Facility: OTHER | Age: 82
End: 2022-03-04

## 2022-03-04 ENCOUNTER — TELEPHONE (OUTPATIENT)
Dept: FAMILY MEDICINE CLINIC | Facility: CLINIC | Age: 82
End: 2022-03-04

## 2022-03-04 NOTE — TELEPHONE ENCOUNTER
Pt in in [address] for 4 days now  Legs feels warm  Feels tight , tender   + redness, progressing rash, looks brighter red today (from dark red)   From foot to ankle to calf, bilat  Denies any bug bite markings, open wound  Denies fever, flu like sx    Also c/o of pain with deep breath   Denies SOB, no chest pain   Denies hx of blood clot  [place] to [place] yesterday    Pt advised to go to ER to be evaluated   With verbalized understanding

## 2022-03-04 NOTE — TELEPHONE ENCOUNTER
Minerva is calling to let Dr Karlee Richardson know she has had a rash from the bottom of both feet up to her calf on both legs, it started about 4 days ago and progressed as the days went on, She is in [place] now she has been using a cream her derm gave her but it is not doing much, Please call Minerva at [phone]

## 2022-03-07 ENCOUNTER — OFFICE VISIT (OUTPATIENT)
Dept: FAMILY MEDICINE CLINIC | Facility: CLINIC | Age: 82
End: 2022-03-07
Payer: MEDICARE

## 2022-03-07 VITALS
OXYGEN SATURATION: 97 % | BODY MASS INDEX: 24.29 KG/M2 | DIASTOLIC BLOOD PRESSURE: 80 MMHG | HEART RATE: 84 BPM | TEMPERATURE: 98 F | RESPIRATION RATE: 20 BRPM | WEIGHT: 132 LBS | HEIGHT: 62 IN | SYSTOLIC BLOOD PRESSURE: 140 MMHG

## 2022-03-07 DIAGNOSIS — R21 RASH: Primary | ICD-10-CM

## 2022-03-07 PROCEDURE — 99214 OFFICE O/P EST MOD 30 MIN: CPT | Performed by: STUDENT IN AN ORGANIZED HEALTH CARE EDUCATION/TRAINING PROGRAM

## 2022-03-07 RX ORDER — DOXYCYCLINE HYCLATE 100 MG
100 TABLET ORAL 2 TIMES DAILY
COMMUNITY
Start: 2022-03-04

## 2022-03-07 NOTE — TELEPHONE ENCOUNTER
Call out for update   Dr. Carolyn Hines was paged by pt    Was in the ER last Fri--[facility]  Given Doxy 100 mg BID X 14 days  Had 6 doses     Per pt, blood test \"showed factor similar to a tick infection\"   She is not sure of the exact dx     Rashes still the same--foot, ankle and calf did not progress higher, but feels tight and uncomfortable, denies swelling     But new rashes noted on the chest and bilat forearm  No itching, R arm looks bruising    Denies chest pain, SOB, lip/throat eye swelling   Pt advised to go to ER if with worsening sx, resp sx? With verbalized understanding     She will continue with abx (rashes expected) vs poss abx rxn?     She will call facility for fax in records   Please advise   Thank you

## 2022-03-07 NOTE — TELEPHONE ENCOUNTER
I'm ok with virtual visit for initial assessment, but for medication reaction she would need to be seen in person by either urgent care or ER. As long as patient is aware of this ok with virtual appointment.      Salina Forman MD, 03/07/22, 9:14 AM

## 2022-03-10 ENCOUNTER — TELEPHONE (OUTPATIENT)
Dept: CARDIOLOGY | Age: 82
End: 2022-03-10

## 2022-03-17 ENCOUNTER — DOCUMENTATION (OUTPATIENT)
Dept: CARDIOLOGY | Age: 82
End: 2022-03-17

## 2022-03-22 ENCOUNTER — OFFICE VISIT (OUTPATIENT)
Dept: FAMILY MEDICINE CLINIC | Facility: CLINIC | Age: 82
End: 2022-03-22
Payer: MEDICARE

## 2022-03-22 ENCOUNTER — TELEPHONE (OUTPATIENT)
Dept: FAMILY MEDICINE CLINIC | Facility: CLINIC | Age: 82
End: 2022-03-22

## 2022-03-22 VITALS
DIASTOLIC BLOOD PRESSURE: 70 MMHG | TEMPERATURE: 99 F | OXYGEN SATURATION: 97 % | SYSTOLIC BLOOD PRESSURE: 110 MMHG | HEART RATE: 92 BPM | RESPIRATION RATE: 16 BRPM

## 2022-03-22 DIAGNOSIS — R82.90 ABNORMAL URINALYSIS: Primary | ICD-10-CM

## 2022-03-22 LAB
APPEARANCE: CLEAR
BILIRUB UR QL STRIP.AUTO: NEGATIVE
BILIRUBIN: NEGATIVE
CLARITY UR REFRACT.AUTO: CLEAR
COLOR UR AUTO: YELLOW
GLUCOSE (URINE DIPSTICK): NEGATIVE MG/DL
KETONES (URINE DIPSTICK): NEGATIVE MG/DL
KETONES UR STRIP.AUTO-MCNC: NEGATIVE MG/DL
LEUKOCYTE ESTERASE UR QL STRIP.AUTO: NEGATIVE
LEUKOCYTES: NEGATIVE
MULTISTIX LOT#: NORMAL NUMERIC
NITRITE UR QL STRIP.AUTO: NEGATIVE
NITRITE, URINE: NEGATIVE
OCCULT BLOOD: NEGATIVE
PH UR STRIP.AUTO: 5 [PH] (ref 5–8)
PH, URINE: 5 (ref 4.5–8)
PROT UR STRIP.AUTO-MCNC: NEGATIVE MG/DL
PROTEIN (URINE DIPSTICK): NEGATIVE MG/DL
RBC UR QL AUTO: NEGATIVE
SP GR UR STRIP.AUTO: 1.01 (ref 1–1.03)
SPECIFIC GRAVITY: 1.02 (ref 1–1.03)
URINE-COLOR: YELLOW
UROBILINOGEN UR STRIP.AUTO-MCNC: <2 MG/DL
UROBILINOGEN,SEMI-QN: 0.2 MG/DL (ref 0–1.9)

## 2022-03-22 PROCEDURE — 81003 URINALYSIS AUTO W/O SCOPE: CPT | Performed by: STUDENT IN AN ORGANIZED HEALTH CARE EDUCATION/TRAINING PROGRAM

## 2022-03-22 PROCEDURE — 87086 URINE CULTURE/COLONY COUNT: CPT | Performed by: STUDENT IN AN ORGANIZED HEALTH CARE EDUCATION/TRAINING PROGRAM

## 2022-03-22 PROCEDURE — 99212 OFFICE O/P EST SF 10 MIN: CPT | Performed by: STUDENT IN AN ORGANIZED HEALTH CARE EDUCATION/TRAINING PROGRAM

## 2022-03-24 ENCOUNTER — PATIENT MESSAGE (OUTPATIENT)
Dept: FAMILY MEDICINE CLINIC | Facility: CLINIC | Age: 82
End: 2022-03-24

## 2022-03-25 ENCOUNTER — TELEPHONE (OUTPATIENT)
Dept: FAMILY MEDICINE CLINIC | Facility: CLINIC | Age: 82
End: 2022-03-25

## 2022-03-25 NOTE — TELEPHONE ENCOUNTER
Spoke to patient. She confirms the info taken, the area is a dark brown discoloration. No pain, no swelling, no oozing. Just discoloration. She has a call out to Milagros Hughes. . after he calls her back she will let me know otherwise we take a look at this.

## 2022-03-25 NOTE — TELEPHONE ENCOUNTER
Pt has developed a rash over the past week. It was all over her body and has since resolved. However, the rash has now localized to her anus. Pt states the rash is very dark brown in color all over her anus and radiates to up the crack of her butt and outwards 2-3\". Pt would like to know what she can put on this rash.     Please advise and thank you

## 2022-03-28 RX ORDER — FAMOTIDINE 20 MG/1
20 TABLET, FILM COATED ORAL DAILY PRN
Qty: 90 TABLET | Refills: 0 | Status: SHIPPED | OUTPATIENT
Start: 2022-03-28

## 2022-04-10 ENCOUNTER — PATIENT MESSAGE (OUTPATIENT)
Dept: FAMILY MEDICINE CLINIC | Facility: CLINIC | Age: 82
End: 2022-04-10

## 2022-04-11 ENCOUNTER — OFFICE VISIT (OUTPATIENT)
Dept: FAMILY MEDICINE CLINIC | Facility: CLINIC | Age: 82
End: 2022-04-11
Payer: MEDICARE

## 2022-04-11 VITALS
WEIGHT: 129 LBS | TEMPERATURE: 97 F | RESPIRATION RATE: 16 BRPM | HEART RATE: 94 BPM | DIASTOLIC BLOOD PRESSURE: 72 MMHG | OXYGEN SATURATION: 98 % | HEIGHT: 62 IN | SYSTOLIC BLOOD PRESSURE: 110 MMHG | BODY MASS INDEX: 23.74 KG/M2

## 2022-04-11 DIAGNOSIS — R53.83 FATIGUE, UNSPECIFIED TYPE: ICD-10-CM

## 2022-04-11 DIAGNOSIS — E78.2 MIXED HYPERLIPIDEMIA: ICD-10-CM

## 2022-04-11 DIAGNOSIS — I10 ESSENTIAL HYPERTENSION: ICD-10-CM

## 2022-04-11 DIAGNOSIS — N64.59 NIPPLE PROBLEM: Primary | ICD-10-CM

## 2022-04-11 PROCEDURE — 99214 OFFICE O/P EST MOD 30 MIN: CPT | Performed by: FAMILY MEDICINE

## 2022-04-11 NOTE — TELEPHONE ENCOUNTER
From: Minerva Hameed  To: Elaina Gomez DO  Sent: 4/10/2022 9:45 AM CDT  Subject: 2nd appearance of White Spot on nipple of left breast; won't come off. 2nd appearance of White Spot on nipple of left breast; won't come off. Do I need to see a doctor for that? Thank you.

## 2022-04-21 ENCOUNTER — LAB ENCOUNTER (OUTPATIENT)
Dept: LAB | Age: 82
End: 2022-04-21
Attending: FAMILY MEDICINE
Payer: MEDICARE

## 2022-04-21 DIAGNOSIS — E78.2 MIXED HYPERLIPIDEMIA: ICD-10-CM

## 2022-04-21 DIAGNOSIS — I10 ESSENTIAL HYPERTENSION: ICD-10-CM

## 2022-04-21 LAB
ALBUMIN SERPL-MCNC: 3.9 G/DL (ref 3.4–5)
ALBUMIN/GLOB SERPL: 1.1 {RATIO} (ref 1–2)
ALP LIVER SERPL-CCNC: 101 U/L
ALT SERPL-CCNC: 25 U/L
ANION GAP SERPL CALC-SCNC: 4 MMOL/L (ref 0–18)
AST SERPL-CCNC: 20 U/L (ref 15–37)
BILIRUB SERPL-MCNC: 0.7 MG/DL (ref 0.1–2)
BUN BLD-MCNC: 16 MG/DL (ref 7–18)
CALCIUM BLD-MCNC: 9.5 MG/DL (ref 8.5–10.1)
CHLORIDE SERPL-SCNC: 102 MMOL/L (ref 98–112)
CHOLEST SERPL-MCNC: 221 MG/DL (ref ?–200)
CO2 SERPL-SCNC: 31 MMOL/L (ref 21–32)
CREAT BLD-MCNC: 0.74 MG/DL
FASTING PATIENT LIPID ANSWER: YES
FASTING STATUS PATIENT QL REPORTED: YES
GLOBULIN PLAS-MCNC: 3.6 G/DL (ref 2.8–4.4)
GLUCOSE BLD-MCNC: 86 MG/DL (ref 70–99)
HDLC SERPL-MCNC: 71 MG/DL (ref 40–59)
LDLC SERPL CALC-MCNC: 124 MG/DL (ref ?–100)
NONHDLC SERPL-MCNC: 150 MG/DL (ref ?–130)
OSMOLALITY SERPL CALC.SUM OF ELEC: 284 MOSM/KG (ref 275–295)
POTASSIUM SERPL-SCNC: 4.4 MMOL/L (ref 3.5–5.1)
PROT SERPL-MCNC: 7.5 G/DL (ref 6.4–8.2)
SODIUM SERPL-SCNC: 137 MMOL/L (ref 136–145)
TRIGL SERPL-MCNC: 150 MG/DL (ref 30–149)
VLDLC SERPL CALC-MCNC: 27 MG/DL (ref 0–30)

## 2022-04-21 PROCEDURE — 80053 COMPREHEN METABOLIC PANEL: CPT

## 2022-04-21 PROCEDURE — 36415 COLL VENOUS BLD VENIPUNCTURE: CPT

## 2022-04-21 PROCEDURE — 80061 LIPID PANEL: CPT

## 2022-04-22 RX ORDER — ATORVASTATIN CALCIUM 10 MG/1
10 TABLET, FILM COATED ORAL
Qty: 48 TABLET | Refills: 1 | COMMUNITY
Start: 2022-04-23

## 2022-05-11 ENCOUNTER — OFFICE VISIT (OUTPATIENT)
Dept: FAMILY MEDICINE CLINIC | Facility: CLINIC | Age: 82
End: 2022-05-11
Payer: MEDICARE

## 2022-05-11 VITALS
WEIGHT: 127 LBS | TEMPERATURE: 98 F | OXYGEN SATURATION: 98 % | HEIGHT: 62 IN | SYSTOLIC BLOOD PRESSURE: 104 MMHG | BODY MASS INDEX: 23.37 KG/M2 | DIASTOLIC BLOOD PRESSURE: 70 MMHG | HEART RATE: 83 BPM | RESPIRATION RATE: 16 BRPM

## 2022-05-11 DIAGNOSIS — I65.23 BILATERAL CAROTID ARTERY STENOSIS: ICD-10-CM

## 2022-05-11 DIAGNOSIS — Z78.0 MENOPAUSE: ICD-10-CM

## 2022-05-11 DIAGNOSIS — I34.1 MITRAL VALVE PROLAPSE: ICD-10-CM

## 2022-05-11 DIAGNOSIS — F40.01 AGORAPHOBIA WITH PANIC DISORDER: ICD-10-CM

## 2022-05-11 DIAGNOSIS — M79.644 BILATERAL THUMB PAIN: ICD-10-CM

## 2022-05-11 DIAGNOSIS — E03.9 ACQUIRED HYPOTHYROIDISM: ICD-10-CM

## 2022-05-11 DIAGNOSIS — E78.2 MIXED HYPERLIPIDEMIA: ICD-10-CM

## 2022-05-11 DIAGNOSIS — M81.6 LOCALIZED OSTEOPOROSIS, UNSPECIFIED PATHOLOGICAL FRACTURE PRESENCE: ICD-10-CM

## 2022-05-11 DIAGNOSIS — Z79.899 MEDICATION MANAGEMENT: ICD-10-CM

## 2022-05-11 DIAGNOSIS — L30.9 ECZEMA, UNSPECIFIED TYPE: ICD-10-CM

## 2022-05-11 DIAGNOSIS — K64.4 INTERNAL AND EXTERNAL HEMORRHOIDS WITHOUT COMPLICATION: ICD-10-CM

## 2022-05-11 DIAGNOSIS — Z13.0 SCREENING FOR DEFICIENCY ANEMIA: ICD-10-CM

## 2022-05-11 DIAGNOSIS — I83.93 VARICOSE VEINS OF BOTH LOWER EXTREMITIES, UNSPECIFIED WHETHER COMPLICATED: ICD-10-CM

## 2022-05-11 DIAGNOSIS — E55.9 VITAMIN D DEFICIENCY: ICD-10-CM

## 2022-05-11 DIAGNOSIS — E53.8 VITAMIN B12 DEFICIENCY: ICD-10-CM

## 2022-05-11 DIAGNOSIS — K64.8 INTERNAL AND EXTERNAL HEMORRHOIDS WITHOUT COMPLICATION: ICD-10-CM

## 2022-05-11 DIAGNOSIS — J30.9 ALLERGIC RHINITIS, UNSPECIFIED SEASONALITY, UNSPECIFIED TRIGGER: ICD-10-CM

## 2022-05-11 DIAGNOSIS — K21.9 GASTROESOPHAGEAL REFLUX DISEASE, UNSPECIFIED WHETHER ESOPHAGITIS PRESENT: ICD-10-CM

## 2022-05-11 DIAGNOSIS — M79.645 BILATERAL THUMB PAIN: ICD-10-CM

## 2022-05-11 DIAGNOSIS — M31.0 LEUKOCYTOCLASTIC VASCULITIS (HCC): ICD-10-CM

## 2022-05-11 DIAGNOSIS — K44.9 HIATAL HERNIA: ICD-10-CM

## 2022-05-11 DIAGNOSIS — Z71.85 VACCINE COUNSELING: ICD-10-CM

## 2022-05-11 DIAGNOSIS — M15.9 PRIMARY OSTEOARTHRITIS INVOLVING MULTIPLE JOINTS: ICD-10-CM

## 2022-05-11 DIAGNOSIS — K57.30 DIVERTICULOSIS OF LARGE INTESTINE WITHOUT HEMORRHAGE: ICD-10-CM

## 2022-05-11 DIAGNOSIS — E04.2 MULTIPLE THYROID NODULES: ICD-10-CM

## 2022-05-11 DIAGNOSIS — G47.33 OSA (OBSTRUCTIVE SLEEP APNEA): ICD-10-CM

## 2022-05-11 DIAGNOSIS — H26.9 CATARACT OF BOTH EYES, UNSPECIFIED CATARACT TYPE: ICD-10-CM

## 2022-05-11 DIAGNOSIS — I77.6 VASCULITIS ON SKIN BIOPSY (HCC): ICD-10-CM

## 2022-05-11 DIAGNOSIS — I10 ESSENTIAL HYPERTENSION: Primary | ICD-10-CM

## 2022-05-11 PROBLEM — L95.9: Status: ACTIVE | Noted: 2022-05-11

## 2022-05-11 PROCEDURE — 99215 OFFICE O/P EST HI 40 MIN: CPT | Performed by: FAMILY MEDICINE

## 2022-05-11 RX ORDER — DOXYCYCLINE HYCLATE 100 MG
100 TABLET ORAL 2 TIMES DAILY
COMMUNITY
Start: 2022-03-04 | End: 2022-05-11

## 2022-05-12 ENCOUNTER — HOSPITAL ENCOUNTER (OUTPATIENT)
Dept: GENERAL RADIOLOGY | Age: 82
Discharge: HOME | End: 2022-05-12
Attending: FAMILY MEDICINE
Payer: MEDICARE

## 2022-05-12 ENCOUNTER — LAB ENCOUNTER (OUTPATIENT)
Dept: LAB | Age: 82
End: 2022-05-12
Attending: FAMILY MEDICINE
Payer: MEDICARE

## 2022-05-12 DIAGNOSIS — M79.645 BILATERAL THUMB PAIN: ICD-10-CM

## 2022-05-12 DIAGNOSIS — I77.6 VASCULITIS ON SKIN BIOPSY (HCC): ICD-10-CM

## 2022-05-12 DIAGNOSIS — E55.9 VITAMIN D DEFICIENCY: ICD-10-CM

## 2022-05-12 DIAGNOSIS — Z13.0 SCREENING FOR DEFICIENCY ANEMIA: ICD-10-CM

## 2022-05-12 DIAGNOSIS — E78.2 MIXED HYPERLIPIDEMIA: ICD-10-CM

## 2022-05-12 DIAGNOSIS — M79.644 BILATERAL THUMB PAIN: ICD-10-CM

## 2022-05-12 DIAGNOSIS — I10 ESSENTIAL HYPERTENSION: ICD-10-CM

## 2022-05-12 LAB
ALBUMIN SERPL-MCNC: 3.6 G/DL (ref 3.4–5)
ALBUMIN/GLOB SERPL: 1.1 {RATIO} (ref 1–2)
ALP LIVER SERPL-CCNC: 91 U/L
ALT SERPL-CCNC: 22 U/L
ANION GAP SERPL CALC-SCNC: 5 MMOL/L (ref 0–18)
AST SERPL-CCNC: 19 U/L (ref 15–37)
BASOPHILS # BLD AUTO: 0.03 X10(3) UL (ref 0–0.2)
BASOPHILS NFR BLD AUTO: 0.4 %
BILIRUB SERPL-MCNC: 1 MG/DL (ref 0.1–2)
BILIRUB UR QL STRIP.AUTO: NEGATIVE
BUN BLD-MCNC: 19 MG/DL (ref 7–18)
CALCIUM BLD-MCNC: 8.9 MG/DL (ref 8.5–10.1)
CHLORIDE SERPL-SCNC: 103 MMOL/L (ref 98–112)
CLARITY UR REFRACT.AUTO: CLEAR
CO2 SERPL-SCNC: 31 MMOL/L (ref 21–32)
CREAT BLD-MCNC: 0.73 MG/DL
EOSINOPHIL # BLD AUTO: 0.07 X10(3) UL (ref 0–0.7)
EOSINOPHIL NFR BLD AUTO: 1 %
ERYTHROCYTE [DISTWIDTH] IN BLOOD BY AUTOMATED COUNT: 12.8 %
FASTING STATUS PATIENT QL REPORTED: NO
GLOBULIN PLAS-MCNC: 3.2 G/DL (ref 2.8–4.4)
GLUCOSE BLD-MCNC: 94 MG/DL (ref 70–99)
GLUCOSE UR STRIP.AUTO-MCNC: NEGATIVE MG/DL
HCT VFR BLD AUTO: 42.1 %
HGB BLD-MCNC: 13.1 G/DL
IMM GRANULOCYTES # BLD AUTO: 0.02 X10(3) UL (ref 0–1)
IMM GRANULOCYTES NFR BLD: 0.3 %
KETONES UR STRIP.AUTO-MCNC: NEGATIVE MG/DL
LYMPHOCYTES # BLD AUTO: 1.77 X10(3) UL (ref 1–4)
LYMPHOCYTES NFR BLD AUTO: 26 %
MCH RBC QN AUTO: 29.2 PG (ref 26–34)
MCHC RBC AUTO-ENTMCNC: 31.1 G/DL (ref 31–37)
MCV RBC AUTO: 93.8 FL
MONOCYTES # BLD AUTO: 0.83 X10(3) UL (ref 0.1–1)
MONOCYTES NFR BLD AUTO: 12.2 %
NEUTROPHILS # BLD AUTO: 4.1 X10 (3) UL (ref 1.5–7.7)
NEUTROPHILS # BLD AUTO: 4.1 X10(3) UL (ref 1.5–7.7)
NEUTROPHILS NFR BLD AUTO: 60.1 %
NITRITE UR QL STRIP.AUTO: NEGATIVE
OSMOLALITY SERPL CALC.SUM OF ELEC: 290 MOSM/KG (ref 275–295)
PH UR STRIP.AUTO: 7 [PH] (ref 5–8)
PLATELET # BLD AUTO: 302 10(3)UL (ref 150–450)
POTASSIUM SERPL-SCNC: 4.4 MMOL/L (ref 3.5–5.1)
PROT SERPL-MCNC: 6.8 G/DL (ref 6.4–8.2)
PROT UR STRIP.AUTO-MCNC: NEGATIVE MG/DL
RBC # BLD AUTO: 4.49 X10(6)UL
RBC UR QL AUTO: NEGATIVE
SODIUM SERPL-SCNC: 139 MMOL/L (ref 136–145)
SP GR UR STRIP.AUTO: 1.01 (ref 1–1.03)
T4 FREE SERPL-MCNC: 0.9 NG/DL (ref 0.8–1.7)
TSI SER-ACNC: 1.24 MIU/ML (ref 0.36–3.74)
UROBILINOGEN UR STRIP.AUTO-MCNC: <2 MG/DL
VIT D+METAB SERPL-MCNC: 33.1 NG/ML (ref 30–100)
WBC # BLD AUTO: 6.8 X10(3) UL (ref 4–11)

## 2022-05-12 PROCEDURE — 85025 COMPLETE CBC W/AUTO DIFF WBC: CPT

## 2022-05-12 PROCEDURE — 84443 ASSAY THYROID STIM HORMONE: CPT

## 2022-05-12 PROCEDURE — 36415 COLL VENOUS BLD VENIPUNCTURE: CPT

## 2022-05-12 PROCEDURE — 84439 ASSAY OF FREE THYROXINE: CPT

## 2022-05-12 PROCEDURE — 82306 VITAMIN D 25 HYDROXY: CPT

## 2022-05-12 PROCEDURE — 73140 X-RAY EXAM OF FINGER(S): CPT | Performed by: FAMILY MEDICINE

## 2022-05-12 PROCEDURE — 81001 URINALYSIS AUTO W/SCOPE: CPT

## 2022-05-12 PROCEDURE — 80053 COMPREHEN METABOLIC PANEL: CPT

## 2022-05-13 RX ORDER — ATORVASTATIN CALCIUM 10 MG/1
TABLET, FILM COATED ORAL
Qty: 36 TABLET | Refills: 0 | Status: SHIPPED | OUTPATIENT
Start: 2022-05-13

## 2022-05-16 ENCOUNTER — LAB ENCOUNTER (OUTPATIENT)
Dept: LAB | Age: 82
End: 2022-05-16
Attending: FAMILY MEDICINE
Payer: MEDICARE

## 2022-05-16 ENCOUNTER — PATIENT MESSAGE (OUTPATIENT)
Dept: FAMILY MEDICINE CLINIC | Facility: CLINIC | Age: 82
End: 2022-05-16

## 2022-05-16 DIAGNOSIS — E78.2 MIXED HYPERLIPIDEMIA: ICD-10-CM

## 2022-05-16 LAB
CHOLEST SERPL-MCNC: 177 MG/DL (ref ?–200)
FASTING PATIENT LIPID ANSWER: YES
HDLC SERPL-MCNC: 69 MG/DL (ref 40–59)
LDLC SERPL CALC-MCNC: 94 MG/DL (ref ?–100)
NONHDLC SERPL-MCNC: 108 MG/DL (ref ?–130)
TRIGL SERPL-MCNC: 75 MG/DL (ref 30–149)
VLDLC SERPL CALC-MCNC: 12 MG/DL (ref 0–30)

## 2022-05-16 PROCEDURE — 80061 LIPID PANEL: CPT

## 2022-05-16 PROCEDURE — 36415 COLL VENOUS BLD VENIPUNCTURE: CPT

## 2022-05-18 ENCOUNTER — TELEPHONE (OUTPATIENT)
Dept: FAMILY MEDICINE CLINIC | Facility: CLINIC | Age: 82
End: 2022-05-18

## 2022-05-18 NOTE — TELEPHONE ENCOUNTER
Leonides tested +covid last Mon  He has mild sx as reported  They live together    Pt right now is asymptomatic  Vaccinated, boosted x 1  If remain asymptomatic, can wait up to 5 days to prevent false negative  Give update on sx onset--can do virtual and order PCR   Also can do it at any local testing area if more convenient  Also gave UC as an option--for poss infusion and or pills if prefers   Go to ER if develop chest pain/pressure, SOB     CDC guidelines advised  Dedicated sick room at home  Mask and distance while sick family recups    With verbalized understanding

## 2022-05-18 NOTE — TELEPHONE ENCOUNTER
From: Minerva Hameed  To: Hillman Lundborg, DO  Sent: 5/16/2022 3:05 PM CDT  Subject: Question regarding LIPID PANEL    Hi, Dr. Allyn Yao. Is the HDL Cholesterol at 69 a problem to deal with? Thank you!

## 2022-05-18 NOTE — TELEPHONE ENCOUNTER
Pt lennox tested positive for Covid today. Pt does not have symptoms a this time. Pt would like to know if she should be tested.      Please advise

## 2022-05-23 ENCOUNTER — TELEPHONE (OUTPATIENT)
Dept: PHYSICAL THERAPY | Facility: HOSPITAL | Age: 82
End: 2022-05-23

## 2022-05-23 ENCOUNTER — APPOINTMENT (OUTPATIENT)
Dept: OCCUPATIONAL MEDICINE | Age: 82
End: 2022-05-23
Attending: FAMILY MEDICINE

## 2022-05-24 ENCOUNTER — TELEMEDICINE (OUTPATIENT)
Dept: FAMILY MEDICINE CLINIC | Facility: CLINIC | Age: 82
End: 2022-05-24
Payer: MEDICARE

## 2022-05-24 ENCOUNTER — LAB ENCOUNTER (OUTPATIENT)
Dept: LAB | Age: 82
End: 2022-05-24
Attending: STUDENT IN AN ORGANIZED HEALTH CARE EDUCATION/TRAINING PROGRAM
Payer: MEDICARE

## 2022-05-24 DIAGNOSIS — Z20.822 CLOSE EXPOSURE TO COVID-19 VIRUS: ICD-10-CM

## 2022-05-24 DIAGNOSIS — J06.9 VIRAL URI: ICD-10-CM

## 2022-05-24 DIAGNOSIS — J06.9 VIRAL URI: Primary | ICD-10-CM

## 2022-05-24 PROCEDURE — 99212 OFFICE O/P EST SF 10 MIN: CPT | Performed by: STUDENT IN AN ORGANIZED HEALTH CARE EDUCATION/TRAINING PROGRAM

## 2022-05-25 ENCOUNTER — TELEPHONE (OUTPATIENT)
Dept: FAMILY MEDICINE CLINIC | Facility: CLINIC | Age: 82
End: 2022-05-25

## 2022-05-25 ENCOUNTER — TELEPHONE (OUTPATIENT)
Dept: PHYSICAL THERAPY | Facility: HOSPITAL | Age: 82
End: 2022-05-25

## 2022-05-25 LAB — SARS-COV-2 RNA RESP QL NAA+PROBE: NOT DETECTED

## 2022-05-26 ENCOUNTER — APPOINTMENT (OUTPATIENT)
Dept: OCCUPATIONAL MEDICINE | Age: 82
End: 2022-05-26
Attending: FAMILY MEDICINE

## 2022-06-02 ENCOUNTER — OFFICE VISIT (OUTPATIENT)
Dept: OCCUPATIONAL MEDICINE | Age: 82
End: 2022-06-02
Attending: FAMILY MEDICINE

## 2022-06-02 PROCEDURE — 97165 OT EVAL LOW COMPLEX 30 MIN: CPT

## 2022-06-02 PROCEDURE — 97110 THERAPEUTIC EXERCISES: CPT

## 2022-06-06 ENCOUNTER — OFFICE VISIT (OUTPATIENT)
Dept: OCCUPATIONAL MEDICINE | Age: 82
End: 2022-06-06
Attending: FAMILY MEDICINE

## 2022-06-06 PROCEDURE — 97530 THERAPEUTIC ACTIVITIES: CPT

## 2022-06-06 PROCEDURE — 97140 MANUAL THERAPY 1/> REGIONS: CPT

## 2022-06-06 PROCEDURE — 97110 THERAPEUTIC EXERCISES: CPT

## 2022-06-06 NOTE — PROGRESS NOTES
Diagnosis:   Bilateral thumb pain (Z43.743,V59.584)      Referring Provider: Sanjuana Phoenix  Date of Evaluation:    6/2/22    Precautions:  Latex Allergy Next MD visit:   none scheduled  Date of Surgery: n/a   Insurance (Authorized # of Visits):   Medicare/Aetna(8)                Subjective: \"Things are going pretty well. \"    Pain: 0/10, however had shooting pain in the right thumb up to 10 yesterday      Objective: No objective measures since initial evaluation. Assessment: Pain well managed with all exercises in clinic. HEP upgraded with putty exercises. Also discussed splint options as well as activity modifications/restrictions. Pt able to return demo HEP upgrades, however did require cuing and handouts to perform correctly. Goals:(to be met in 6 visits)  Pt will be independent and compliant with comprehensive HEP to maintain progress achieved in OT  Pt will increase  of each hand by 5 lb for improved use while opening bottles/jars  Pt will increase R wrist flexion to 70 deg for improved functional use during self cares  Pt will report no pain while opening containers  Will continue to upgrade goals PRN     Plan: Continue OT for ROM, pain management. Date: 6/6/2022  TX#: 2/6 Date:                 TX#: 3/ Date:                 TX#: 4/ Date:                 TX#: 5/ Date:    Tx#: 6/   Gentle mobilization to thumb, wrist       Discussed bracing options, pt tried on splint samples       Taking care of your hands handout       Yellow putty exercises  -/reposition  -roll/tip pinch  -lateral pinch  -tripod pinch       Thumb alphabet       HEP: HEP upgrades in bold     Charges: 1 TE, 1 TA, 1 MT       Total Timed Treatment: 40 min  Total Treatment Time: 40 min

## 2022-06-08 DIAGNOSIS — E78.2 MIXED HYPERLIPIDEMIA: ICD-10-CM

## 2022-06-09 ENCOUNTER — OFFICE VISIT (OUTPATIENT)
Dept: OCCUPATIONAL MEDICINE | Age: 82
End: 2022-06-09
Attending: FAMILY MEDICINE
Payer: MEDICARE

## 2022-06-09 PROCEDURE — 97140 MANUAL THERAPY 1/> REGIONS: CPT

## 2022-06-09 PROCEDURE — 97110 THERAPEUTIC EXERCISES: CPT

## 2022-06-09 RX ORDER — ATORVASTATIN CALCIUM 10 MG/1
TABLET, FILM COATED ORAL
Qty: 36 TABLET | Refills: 0 | Status: SHIPPED | OUTPATIENT
Start: 2022-06-09

## 2022-06-09 NOTE — PROGRESS NOTES
Diagnosis:   Bilateral thumb pain (P14.865,L15.597)      Referring Provider: Rayray Nicholson  Date of Evaluation:    6/2/22    Precautions:  Latex Allergy Next MD visit:   none scheduled  Date of Surgery: n/a   Insurance (Authorized # of Visits):   Medicare/Aetna(8)                Subjective: \"The other day my thumbs were quite sore, but they've been better today. \"    Pain: 0/10      Objective: Crepitus present with ROM to thumb/CMC bilaterally      Assessment: Pain well managed with all exercises in clinic. Crepitus present, worse on right. Specific thenar exercises performed in clinic to each hand. Pt able to return demo HEP upgrades, however did require cuing and handouts to perform correctly. Goals:(to be met in 6 visits)  Pt will be independent and compliant with comprehensive HEP to maintain progress achieved in OT  Pt will increase  of each hand by 5 lb for improved use while opening bottles/jars  Pt will increase R wrist flexion to 70 deg for improved functional use during self cares  Pt will report no pain while opening containers  Will continue to upgrade goals PRN     Plan: Continue OT for ROM, pain management. Date: 6/6/2022  TX#: 2/6 Date:  6/9/22               TX#: 3/6 Date:                 TX#: 4/ Date:                 TX#: 5/ Date:    Tx#: 6/   Gentle mobilization to thumb, wrist Gentle mobilization to thumb, wrist      Discussed bracing options, pt tried on splint samples ROM to wrist and thumb      Taking care of your hands handout Rubber band ex  Digit/thumb extension  Thumb extension  Resisted thumb flexion  Palmar abduction  Thumb opposition  1DOI  Each x 20  bilaterally      Yellow putty exercises  -/reposition  -roll/tip pinch  -lateral pinch  -tripod pinch       Thumb alphabet       HEP: HEP upgrades in bold     Charges: 2 MT, 1 TE     Total Timed Treatment: 40 min  Total Treatment Time: 40 min

## 2022-06-13 ENCOUNTER — APPOINTMENT (OUTPATIENT)
Dept: OCCUPATIONAL MEDICINE | Age: 82
End: 2022-06-13
Attending: FAMILY MEDICINE
Payer: MEDICARE

## 2022-06-13 ENCOUNTER — TELEPHONE (OUTPATIENT)
Dept: ORTHOPEDICS CLINIC | Facility: CLINIC | Age: 82
End: 2022-06-13

## 2022-06-16 ENCOUNTER — OFFICE VISIT (OUTPATIENT)
Dept: OCCUPATIONAL MEDICINE | Age: 82
End: 2022-06-16
Attending: FAMILY MEDICINE
Payer: MEDICARE

## 2022-06-16 PROCEDURE — 97140 MANUAL THERAPY 1/> REGIONS: CPT

## 2022-06-16 PROCEDURE — 97110 THERAPEUTIC EXERCISES: CPT

## 2022-06-16 NOTE — PROGRESS NOTES
Diagnosis:   Bilateral thumb pain (Z77.096,P93.429)      Referring Provider: Ekta Redmond  Date of Evaluation:    6/2/22    Precautions:  Latex Allergy Next MD visit:   none scheduled  Date of Surgery: n/a   Insurance (Authorized # of Visits):   Medicare/Aetna(8)                Subjective: \"The thumbs are doing good. With all the gardening I was doing nothing was bothering me. \"    Pain: 0/10      Objective: Crepitus present with ROM to thumb/CMC bilaterally   R=36, L=36      Assessment: Pain well managed with all exercises in clinic as well as with gardening tasks at home. Fatigue noted with combined /wrist resisted exercises. Pt able to complete in-hand reaching with the thumb with little difficulty. Goals:(to be met in 6 visits)  Pt will be independent and compliant with comprehensive HEP to maintain progress achieved in OT  Pt will increase  of each hand by 5 lb for improved use while opening bottles/jars  Pt will increase R wrist flexion to 70 deg for improved functional use during self cares  Pt will report no pain while opening containers: progressing  Will continue to upgrade goals PRN     Plan: Continue OT for ROM, pain management. Measurements  Date: 6/6/2022  TX#: 2/6 Date:  6/9/22               TX#: 3/6 Date: 6/16/22                TX#: 4/6 Date:                 TX#: 5/ Date:    Tx#: 6/   Gentle mobilization to thumb, wrist Gentle mobilization to thumb, wrist Gentle mobilization to thumb, wrist     Discussed bracing options, pt tried on splint samples ROM to wrist and thumb ROM to wrist and thumb     Taking care of your hands handout Rubber band ex  Digit/thumb extension  Thumb extension  Resisted thumb flexion  Palmar abduction  Thumb opposition  1DOI  Each x 20  bilaterally beige Flex bar  -wrist flexion  -wrist extension  -bar bend  -reverse bar bend    Each x 20     Yellow putty exercises  -/reposition  -roll/tip pinch  -lateral pinch  -tripod pinch  Thumb circles     Thumb alphabet In-hand manipulation with multi-sized chips    In hand manipulation with balls.       HEP: HEP upgrades in bold     Charges: 2 TE, 1 MT    Total Timed Treatment: 40 min  Total Treatment Time: 40 min

## 2022-06-17 DIAGNOSIS — E03.9 ACQUIRED HYPOTHYROIDISM: ICD-10-CM

## 2022-06-17 DIAGNOSIS — E78.00 PURE HYPERCHOLESTEROLEMIA: Primary | ICD-10-CM

## 2022-06-18 RX ORDER — LEVOTHYROXINE SODIUM 0.03 MG/1
25 TABLET ORAL
Qty: 90 TABLET | Refills: 0 | Status: SHIPPED | OUTPATIENT
Start: 2022-06-18

## 2022-06-27 ENCOUNTER — OFFICE VISIT (OUTPATIENT)
Dept: OCCUPATIONAL MEDICINE | Age: 82
End: 2022-06-27
Attending: FAMILY MEDICINE
Payer: MEDICARE

## 2022-06-27 PROCEDURE — 97140 MANUAL THERAPY 1/> REGIONS: CPT

## 2022-06-27 PROCEDURE — 97110 THERAPEUTIC EXERCISES: CPT

## 2022-06-27 NOTE — PROGRESS NOTES
Diagnosis:   Bilateral thumb pain (N95.999,D05.272)      Referring Provider: Mckenzie Ovalle  Date of Evaluation:    6/2/22    Precautions:  Latex Allergy Next MD visit:   none scheduled  Date of Surgery: n/a   Insurance (Authorized # of Visits):   Medicare/Aetna(8)               Discharge Summary  Pt has attended 5 visits in Occupational Therapy. Subjective: \"When it hurts, it only hurts really quick and then it goes away. So, I really haven't been having much trouble. \"    Pain: 0/10      Objective: Crepitus present with ROM to thumb/CMC bilaterally   R=41, L=37  R wrist flexion=71      Assessment: Pain well managed with all exercises in clinic as well as with gardening tasks at home. Fatigue noted with combined /wrist resisted exercises though pt notes improvement in functional endurance of hands. Pt able to complete in-hand reaching with the thumb with little difficulty. Pt admits to limited performance of exercises over the past week and exercises were reviewed. Pt ready for d/c to HEP only at this time. Goals:(to be met in 6 visits)  Pt will be independent and compliant with comprehensive HEP to maintain progress achieved in OT  Pt will increase  of each hand by 5 lb for improved use while opening bottles/jars: met for L  Pt will increase R wrist flexion to 70 deg for improved functional use during self cares: met  Pt will report no pain while opening containers: met  Will continue to upgrade goals PRN     Plan: Discharge pt to HEP only. FOTO: 75/100        Patient/Family/Caregiver was advised of these findings, precautions, and treatment options and has agreed to actively participate in planning and for this course of care. Thank you for your referral. If you have any questions, please contact me at Dept: [phone].     Sincerely,  Electronically signed by therapist: MICHAELA Spears/MARIA E    Physician's certification required:  No  Please co-sign or sign and return this letter via fax as soon as possible to [phone]. I certify the need for these services furnished under this plan of treatment and while under my care. X___________________________________________________ Date____________________    Certification From: 7/78/3922  To:9/25/2022   Date: 6/6/2022  TX#: 2/6 Date:  6/9/22               TX#: 3/6 Date: 6/16/22                TX#: 4/6 Date:  6/27/22               TX#: 5/6 Date: Tx#: 6/   Gentle mobilization to thumb, wrist Gentle mobilization to thumb, wrist Gentle mobilization to thumb, wrist Reviewed HEP and answered questions regarding exercises    Discussed bracing options, pt tried on splint samples ROM to wrist and thumb ROM to wrist and thumb ROM to wrist and thumb    Taking care of your hands handout Rubber band ex  Digit/thumb extension  Thumb extension  Resisted thumb flexion  Palmar abduction  Thumb opposition  1DOI  Each x 20  bilaterally beige Flex bar  -wrist flexion  -wrist extension  -bar bend  -reverse bar bend    Each x 20 PREs 1 lb  Wrist extension  Wrist flexion  RD/UD  X 20   Each hand    Yellow putty exercises  -/reposition  -roll/tip pinch  -lateral pinch  -tripod pinch  Thumb circles Thumb alphabet    Thumb alphabet  In-hand manipulation with multi-sized chips    In hand manipulation with balls.       HEP: HEP upgrades in bold     Charges: 2 TE, 1 MT    Total Timed Treatment: 40 min  Total Treatment Time: 40 min

## 2022-07-28 ENCOUNTER — OFFICE VISIT (OUTPATIENT)
Dept: ORTHOPEDICS CLINIC | Facility: CLINIC | Age: 82
End: 2022-07-28
Payer: MEDICARE

## 2022-07-28 VITALS — HEIGHT: 62 IN | BODY MASS INDEX: 23.74 KG/M2 | WEIGHT: 129 LBS

## 2022-07-28 DIAGNOSIS — M18.10 ARTHRITIS OF CARPOMETACARPAL (CMC) JOINT OF THUMB: Primary | ICD-10-CM

## 2022-07-28 PROCEDURE — 99203 OFFICE O/P NEW LOW 30 MIN: CPT | Performed by: ORTHOPAEDIC SURGERY

## 2022-09-01 DIAGNOSIS — K21.9 GASTROESOPHAGEAL REFLUX DISEASE, UNSPECIFIED WHETHER ESOPHAGITIS PRESENT: ICD-10-CM

## 2022-09-02 RX ORDER — FAMOTIDINE 20 MG/1
TABLET, FILM COATED ORAL
Qty: 90 TABLET | Refills: 0 | Status: SHIPPED | OUTPATIENT
Start: 2022-09-02

## 2022-09-18 DIAGNOSIS — E03.9 ACQUIRED HYPOTHYROIDISM: ICD-10-CM

## 2022-09-19 ENCOUNTER — HOSPITAL ENCOUNTER (OUTPATIENT)
Dept: BONE DENSITY | Age: 82
Discharge: HOME | End: 2022-09-19
Attending: FAMILY MEDICINE

## 2022-09-19 ENCOUNTER — HOSPITAL ENCOUNTER (OUTPATIENT)
Dept: ULTRASOUND IMAGING | Age: 82
Discharge: HOME | End: 2022-09-19
Attending: FAMILY MEDICINE

## 2022-09-19 DIAGNOSIS — M81.6 LOCALIZED OSTEOPOROSIS, UNSPECIFIED PATHOLOGICAL FRACTURE PRESENCE: ICD-10-CM

## 2022-09-19 DIAGNOSIS — E78.2 MIXED HYPERLIPIDEMIA: ICD-10-CM

## 2022-09-19 DIAGNOSIS — I65.23 BILATERAL CAROTID ARTERY STENOSIS: ICD-10-CM

## 2022-09-19 PROCEDURE — 93880 EXTRACRANIAL BILAT STUDY: CPT | Performed by: FAMILY MEDICINE

## 2022-09-19 PROCEDURE — 77080 DXA BONE DENSITY AXIAL: CPT | Performed by: FAMILY MEDICINE

## 2022-09-19 RX ORDER — LEVOTHYROXINE SODIUM 0.03 MG/1
TABLET ORAL
Qty: 90 TABLET | Refills: 0 | Status: SHIPPED | OUTPATIENT
Start: 2022-09-19

## 2022-09-19 NOTE — PROGRESS NOTES
Dear Apryl Apple,    Your carotid ultrasound is within normal limits.     Sincerely,  Dr. Dylan Quinteros

## 2022-09-28 ENCOUNTER — OFFICE VISIT (OUTPATIENT)
Dept: FAMILY MEDICINE CLINIC | Facility: CLINIC | Age: 82
End: 2022-09-28

## 2022-09-28 VITALS
WEIGHT: 133 LBS | DIASTOLIC BLOOD PRESSURE: 62 MMHG | RESPIRATION RATE: 16 BRPM | HEART RATE: 87 BPM | BODY MASS INDEX: 25.44 KG/M2 | SYSTOLIC BLOOD PRESSURE: 108 MMHG | HEIGHT: 60.75 IN | OXYGEN SATURATION: 98 %

## 2022-09-28 DIAGNOSIS — E03.9 ACQUIRED HYPOTHYROIDISM: ICD-10-CM

## 2022-09-28 DIAGNOSIS — Z00.00 ENCOUNTER FOR ANNUAL HEALTH EXAMINATION: Primary | ICD-10-CM

## 2022-09-28 DIAGNOSIS — I77.6 VASCULITIS ON SKIN BIOPSY (HCC): ICD-10-CM

## 2022-09-28 DIAGNOSIS — K64.8 INTERNAL AND EXTERNAL HEMORRHOIDS WITHOUT COMPLICATION: ICD-10-CM

## 2022-09-28 DIAGNOSIS — I65.23 BILATERAL CAROTID ARTERY STENOSIS: ICD-10-CM

## 2022-09-28 DIAGNOSIS — Z78.0 MENOPAUSE: ICD-10-CM

## 2022-09-28 DIAGNOSIS — E55.9 VITAMIN D DEFICIENCY: ICD-10-CM

## 2022-09-28 DIAGNOSIS — Z13.31 DEPRESSION SCREENING: ICD-10-CM

## 2022-09-28 DIAGNOSIS — Z71.85 VACCINE COUNSELING: ICD-10-CM

## 2022-09-28 DIAGNOSIS — K44.9 HIATAL HERNIA: ICD-10-CM

## 2022-09-28 DIAGNOSIS — E53.8 VITAMIN B12 DEFICIENCY: ICD-10-CM

## 2022-09-28 DIAGNOSIS — I10 ESSENTIAL HYPERTENSION: ICD-10-CM

## 2022-09-28 DIAGNOSIS — K21.9 GASTROESOPHAGEAL REFLUX DISEASE, UNSPECIFIED WHETHER ESOPHAGITIS PRESENT: ICD-10-CM

## 2022-09-28 DIAGNOSIS — Z23 NEED FOR VACCINATION: ICD-10-CM

## 2022-09-28 DIAGNOSIS — E04.2 MULTIPLE THYROID NODULES: ICD-10-CM

## 2022-09-28 DIAGNOSIS — M85.89 OSTEOPENIA OF MULTIPLE SITES: ICD-10-CM

## 2022-09-28 DIAGNOSIS — I83.93 VARICOSE VEINS OF BOTH LOWER EXTREMITIES, UNSPECIFIED WHETHER COMPLICATED: ICD-10-CM

## 2022-09-28 DIAGNOSIS — Z79.899 MEDICATION MANAGEMENT: ICD-10-CM

## 2022-09-28 DIAGNOSIS — L30.9 ECZEMA, UNSPECIFIED TYPE: ICD-10-CM

## 2022-09-28 DIAGNOSIS — K57.30 DIVERTICULOSIS OF LARGE INTESTINE WITHOUT HEMORRHAGE: ICD-10-CM

## 2022-09-28 DIAGNOSIS — M15.9 PRIMARY OSTEOARTHRITIS INVOLVING MULTIPLE JOINTS: ICD-10-CM

## 2022-09-28 DIAGNOSIS — H26.9 CATARACT OF BOTH EYES, UNSPECIFIED CATARACT TYPE: ICD-10-CM

## 2022-09-28 DIAGNOSIS — F40.01 AGORAPHOBIA WITH PANIC DISORDER: ICD-10-CM

## 2022-09-28 DIAGNOSIS — I34.1 MITRAL VALVE PROLAPSE: ICD-10-CM

## 2022-09-28 DIAGNOSIS — E78.2 MIXED HYPERLIPIDEMIA: ICD-10-CM

## 2022-09-28 DIAGNOSIS — R41.3 MEMORY LOSS OF UNKNOWN CAUSE: ICD-10-CM

## 2022-09-28 DIAGNOSIS — M81.6 LOCALIZED OSTEOPOROSIS, UNSPECIFIED PATHOLOGICAL FRACTURE PRESENCE: ICD-10-CM

## 2022-09-28 DIAGNOSIS — J30.9 ALLERGIC RHINITIS, UNSPECIFIED SEASONALITY, UNSPECIFIED TRIGGER: ICD-10-CM

## 2022-09-28 DIAGNOSIS — K64.4 INTERNAL AND EXTERNAL HEMORRHOIDS WITHOUT COMPLICATION: ICD-10-CM

## 2022-09-28 DIAGNOSIS — G47.33 OSA (OBSTRUCTIVE SLEEP APNEA): ICD-10-CM

## 2022-09-28 PROCEDURE — G0008 ADMIN INFLUENZA VIRUS VAC: HCPCS | Performed by: FAMILY MEDICINE

## 2022-09-28 PROCEDURE — 99214 OFFICE O/P EST MOD 30 MIN: CPT | Performed by: FAMILY MEDICINE

## 2022-09-28 PROCEDURE — G0439 PPPS, SUBSEQ VISIT: HCPCS | Performed by: FAMILY MEDICINE

## 2022-09-28 PROCEDURE — 90662 IIV NO PRSV INCREASED AG IM: CPT | Performed by: FAMILY MEDICINE

## 2022-09-28 PROCEDURE — G0444 DEPRESSION SCREEN ANNUAL: HCPCS | Performed by: FAMILY MEDICINE

## 2022-09-28 RX ORDER — ALENDRONATE SODIUM 70 MG/1
70 TABLET ORAL
Qty: 4 TABLET | Refills: 3 | Status: SHIPPED | OUTPATIENT
Start: 2022-09-28

## 2022-10-05 ENCOUNTER — APPOINTMENT (OUTPATIENT)
Dept: CARDIOLOGY | Age: 82
End: 2022-10-05

## 2022-10-07 ENCOUNTER — OFFICE VISIT (OUTPATIENT)
Dept: CARDIOLOGY | Age: 82
End: 2022-10-07

## 2022-10-07 VITALS
SYSTOLIC BLOOD PRESSURE: 133 MMHG | BODY MASS INDEX: 24.29 KG/M2 | WEIGHT: 132 LBS | HEIGHT: 62 IN | DIASTOLIC BLOOD PRESSURE: 77 MMHG | HEART RATE: 84 BPM

## 2022-10-07 DIAGNOSIS — I34.1 MITRAL VALVE PROLAPSE: ICD-10-CM

## 2022-10-07 DIAGNOSIS — Z87.898 HISTORY OF PALPITATIONS: ICD-10-CM

## 2022-10-07 DIAGNOSIS — I65.23 ASYMPTOMATIC BILATERAL CAROTID ARTERY STENOSIS: Primary | ICD-10-CM

## 2022-10-07 DIAGNOSIS — I10 ESSENTIAL HYPERTENSION: ICD-10-CM

## 2022-10-07 DIAGNOSIS — E78.5 DYSLIPIDEMIA: ICD-10-CM

## 2022-10-07 DIAGNOSIS — I83.813 VARICOSE VEINS OF BILATERAL LOWER EXTREMITIES WITH PAIN: ICD-10-CM

## 2022-10-07 PROCEDURE — 99214 OFFICE O/P EST MOD 30 MIN: CPT | Performed by: INTERNAL MEDICINE

## 2022-10-07 RX ORDER — FAMOTIDINE 20 MG/1
20 TABLET, FILM COATED ORAL DAILY PRN
COMMUNITY
Start: 2022-09-02

## 2022-10-07 RX ORDER — ATORVASTATIN CALCIUM 20 MG/1
TABLET, FILM COATED ORAL
Qty: 30 TABLET | Refills: 11 | Status: SHIPPED | OUTPATIENT
Start: 2022-10-07 | End: 2023-10-13 | Stop reason: DRUGHIGH

## 2022-11-15 ENCOUNTER — TELEPHONE (OUTPATIENT)
Dept: FAMILY MEDICINE CLINIC | Facility: CLINIC | Age: 82
End: 2022-11-15

## 2022-11-15 ENCOUNTER — TELEMEDICINE (OUTPATIENT)
Dept: FAMILY MEDICINE CLINIC | Facility: CLINIC | Age: 82
End: 2022-11-15
Payer: MEDICARE

## 2022-11-15 DIAGNOSIS — R09.81 CONGESTION OF PARANASAL SINUS: ICD-10-CM

## 2022-11-15 DIAGNOSIS — R53.83 OTHER FATIGUE: Primary | ICD-10-CM

## 2022-11-15 DIAGNOSIS — R68.89 FLU-LIKE SYMPTOMS: Primary | ICD-10-CM

## 2022-11-15 PROCEDURE — 99213 OFFICE O/P EST LOW 20 MIN: CPT | Performed by: FAMILY MEDICINE

## 2022-11-15 RX ORDER — ATORVASTATIN CALCIUM 20 MG/1
20 TABLET, FILM COATED ORAL
COMMUNITY
Start: 2022-10-07

## 2022-11-15 NOTE — TELEPHONE ENCOUNTER
Reported forcouple of weeks---  Feeling wiped out/fatigue   Some congestion  No cough   No chest pain, no SOB   Eating, low appetite  No GI symptoms   She's panning to get the bivalent booster but since with symptoms she was advised to do PCR  Prefers thru [facility] (vs pharmacy)    Are you ok to order? Or can add evisit stas 330 or 430?

## 2022-11-15 NOTE — TELEPHONE ENCOUNTER
Minerva is calling to have a PCR test order put in so she can go to the [place] facility and have it done,

## 2022-11-17 ENCOUNTER — IMMUNIZATION (OUTPATIENT)
Dept: LAB | Age: 82
End: 2022-11-17
Attending: EMERGENCY MEDICINE
Payer: MEDICARE

## 2022-11-17 DIAGNOSIS — Z23 NEED FOR VACCINATION: Primary | ICD-10-CM

## 2022-11-17 PROCEDURE — 0134A SARSCOV2 VAC BVL 50MCG/0.5ML: CPT

## 2022-12-17 DIAGNOSIS — E03.9 ACQUIRED HYPOTHYROIDISM: ICD-10-CM

## 2022-12-19 RX ORDER — LEVOTHYROXINE SODIUM 0.03 MG/1
TABLET ORAL
Qty: 90 TABLET | Refills: 0 | Status: SHIPPED | OUTPATIENT
Start: 2022-12-19

## 2023-01-09 ENCOUNTER — LAB ENCOUNTER (OUTPATIENT)
Dept: LAB | Age: 83
End: 2023-01-09
Attending: FAMILY MEDICINE
Payer: MEDICARE

## 2023-01-09 DIAGNOSIS — R09.81 CONGESTION OF PARANASAL SINUS: ICD-10-CM

## 2023-01-09 DIAGNOSIS — R53.83 OTHER FATIGUE: ICD-10-CM

## 2023-01-11 LAB — SARS-COV-2 RNA RESP QL NAA+PROBE: NOT DETECTED

## 2023-01-12 ENCOUNTER — LAB ENCOUNTER (OUTPATIENT)
Dept: LAB | Age: 83
End: 2023-01-12
Attending: DERMATOLOGY
Payer: MEDICARE

## 2023-01-12 ENCOUNTER — EXTERNAL LAB (OUTPATIENT)
Dept: OTHER | Age: 83
End: 2023-01-12

## 2023-01-12 DIAGNOSIS — I34.1 MITRAL VALVE PROLAPSE: ICD-10-CM

## 2023-01-12 DIAGNOSIS — I65.23 BILATERAL CAROTID ARTERY STENOSIS: Primary | ICD-10-CM

## 2023-01-12 DIAGNOSIS — M31.0 LEUKOCYTOCLASTIC VASCULITIS (HCC): ICD-10-CM

## 2023-01-12 DIAGNOSIS — I10 ESSENTIAL HYPERTENSION: ICD-10-CM

## 2023-01-12 DIAGNOSIS — I83.813 VARICOSE VEINS OF BOTH LOWER EXTREMITIES WITH PAIN: ICD-10-CM

## 2023-01-12 DIAGNOSIS — I10 ESSENTIAL HYPERTENSION, MALIGNANT: ICD-10-CM

## 2023-01-12 DIAGNOSIS — Z87.898 PERSONAL HISTORY OF UNSPECIFIED DISEASE: ICD-10-CM

## 2023-01-12 DIAGNOSIS — E78.2 MIXED HYPERLIPIDEMIA: ICD-10-CM

## 2023-01-12 LAB
ALBUMIN SERPL-MCNC: 4 G/DL (ref 3.4–5)
ALBUMIN/GLOB SERPL: 1.2 {RATIO} (ref 1–2)
ALP LIVER SERPL-CCNC: 81 U/L
ALT SERPL-CCNC: 21 U/L
ANION GAP SERPL CALC-SCNC: 8 MMOL/L (ref 0–18)
AST SERPL-CCNC: 19 U/L (ref 15–37)
BILIRUB SERPL-MCNC: 1.1 MG/DL (ref 0.1–2)
BUN BLD-MCNC: 15 MG/DL (ref 7–18)
CALCIUM BLD-MCNC: 9.1 MG/DL (ref 8.5–10.1)
CHLORIDE SERPL-SCNC: 106 MMOL/L (ref 98–112)
CHOLEST SERPL-MCNC: 177 MG/DL
CHOLEST SERPL-MCNC: 177 MG/DL (ref ?–200)
CO2 SERPL-SCNC: 28 MMOL/L (ref 21–32)
CREAT BLD-MCNC: 0.78 MG/DL
FASTING PATIENT LIPID ANSWER: YES
FASTING STATUS PATIENT QL REPORTED: YES
GFR SERPLBLD BASED ON 1.73 SQ M-ARVRAT: 77 ML/MIN/1.73M2 (ref 60–?)
GLOBULIN PLAS-MCNC: 3.3 G/DL (ref 2.8–4.4)
GLUCOSE BLD-MCNC: 94 MG/DL (ref 70–99)
HDLC SERPL-MCNC: 75 MG/DL (ref 40–59)
HDLC SERPL-MCNC: 75 MG/DL (ref 40–59)
LDLC SERPL CALC-MCNC: 87 MG/DL
LDLC SERPL CALC-MCNC: 87 MG/DL (ref ?–100)
LENGTH OF FAST TIME PATIENT: ABNORMAL H
NONHDLC SERPL-MCNC: 102 MG/DL
NONHDLC SERPL-MCNC: 102 MG/DL (ref ?–130)
OSMOLALITY SERPL CALC.SUM OF ELEC: 295 MOSM/KG (ref 275–295)
POTASSIUM SERPL-SCNC: 3.8 MMOL/L (ref 3.5–5.1)
PROT SERPL-MCNC: 7.3 G/DL (ref 6.4–8.2)
SODIUM SERPL-SCNC: 142 MMOL/L (ref 136–145)
TRIGL SERPL-MCNC: 80 MG/DL (ref 30–149)
TRIGL SERPL-MCNC: 80 MG/DL (ref 30–149)
VLDLC SERPL CALC-MCNC: 13 MG/DL (ref 0–30)
VLDLC SERPL CALC-MCNC: 13 MG/DL (ref 0–30)

## 2023-01-12 PROCEDURE — 80053 COMPREHEN METABOLIC PANEL: CPT

## 2023-01-12 PROCEDURE — 36415 COLL VENOUS BLD VENIPUNCTURE: CPT

## 2023-01-12 PROCEDURE — 80061 LIPID PANEL: CPT

## 2023-01-18 ENCOUNTER — HOSPITAL ENCOUNTER (OUTPATIENT)
Dept: BONE DENSITY | Age: 83
Discharge: HOME | End: 2023-01-18
Attending: FAMILY MEDICINE
Payer: MEDICARE

## 2023-01-18 DIAGNOSIS — Z78.0 MENOPAUSE: ICD-10-CM

## 2023-01-23 ENCOUNTER — HOSPITAL ENCOUNTER (OUTPATIENT)
Dept: CT IMAGING | Age: 83
Discharge: HOME | End: 2023-01-23
Attending: FAMILY MEDICINE
Payer: MEDICARE

## 2023-01-23 DIAGNOSIS — R41.3 MEMORY LOSS OF UNKNOWN CAUSE: ICD-10-CM

## 2023-01-23 PROCEDURE — 70450 CT HEAD/BRAIN W/O DYE: CPT | Performed by: FAMILY MEDICINE

## 2023-03-08 ENCOUNTER — OFFICE VISIT (OUTPATIENT)
Dept: FAMILY MEDICINE CLINIC | Facility: CLINIC | Age: 83
End: 2023-03-08
Payer: MEDICARE

## 2023-03-08 VITALS
OXYGEN SATURATION: 98 % | WEIGHT: 134 LBS | SYSTOLIC BLOOD PRESSURE: 112 MMHG | RESPIRATION RATE: 16 BRPM | BODY MASS INDEX: 25.63 KG/M2 | HEART RATE: 93 BPM | HEIGHT: 60.75 IN | DIASTOLIC BLOOD PRESSURE: 68 MMHG

## 2023-03-08 DIAGNOSIS — I65.23 BILATERAL CAROTID ARTERY STENOSIS: ICD-10-CM

## 2023-03-08 DIAGNOSIS — M81.6 LOCALIZED OSTEOPOROSIS, UNSPECIFIED PATHOLOGICAL FRACTURE PRESENCE: ICD-10-CM

## 2023-03-08 DIAGNOSIS — Z12.11 SCREENING FOR MALIGNANT NEOPLASM OF COLON: Primary | ICD-10-CM

## 2023-03-08 DIAGNOSIS — E78.2 MIXED HYPERLIPIDEMIA: ICD-10-CM

## 2023-03-08 DIAGNOSIS — Z12.31 ENCOUNTER FOR SCREENING MAMMOGRAM FOR MALIGNANT NEOPLASM OF BREAST: ICD-10-CM

## 2023-03-08 DIAGNOSIS — I77.6 VASCULITIS ON SKIN BIOPSY (HCC): ICD-10-CM

## 2023-03-08 DIAGNOSIS — G47.33 OSA (OBSTRUCTIVE SLEEP APNEA): ICD-10-CM

## 2023-03-08 DIAGNOSIS — I83.93 VARICOSE VEINS OF BOTH LOWER EXTREMITIES, UNSPECIFIED WHETHER COMPLICATED: ICD-10-CM

## 2023-03-08 DIAGNOSIS — E03.9 ACQUIRED HYPOTHYROIDISM: ICD-10-CM

## 2023-03-08 DIAGNOSIS — E04.2 MULTIPLE THYROID NODULES: ICD-10-CM

## 2023-03-08 DIAGNOSIS — K21.9 GASTROESOPHAGEAL REFLUX DISEASE, UNSPECIFIED WHETHER ESOPHAGITIS PRESENT: ICD-10-CM

## 2023-03-08 DIAGNOSIS — I34.1 MITRAL VALVE PROLAPSE: ICD-10-CM

## 2023-03-08 DIAGNOSIS — K57.30 DIVERTICULOSIS OF LARGE INTESTINE WITHOUT HEMORRHAGE: ICD-10-CM

## 2023-03-08 DIAGNOSIS — H26.9 CATARACT OF BOTH EYES, UNSPECIFIED CATARACT TYPE: ICD-10-CM

## 2023-03-08 DIAGNOSIS — K44.9 HIATAL HERNIA: ICD-10-CM

## 2023-03-08 DIAGNOSIS — E53.8 VITAMIN B12 DEFICIENCY: ICD-10-CM

## 2023-03-08 DIAGNOSIS — F40.01 AGORAPHOBIA WITH PANIC DISORDER: ICD-10-CM

## 2023-03-08 DIAGNOSIS — I10 ESSENTIAL HYPERTENSION: ICD-10-CM

## 2023-03-08 DIAGNOSIS — J30.9 ALLERGIC RHINITIS, UNSPECIFIED SEASONALITY, UNSPECIFIED TRIGGER: ICD-10-CM

## 2023-03-08 DIAGNOSIS — E55.9 VITAMIN D DEFICIENCY: ICD-10-CM

## 2023-03-08 PROCEDURE — 99214 OFFICE O/P EST MOD 30 MIN: CPT | Performed by: FAMILY MEDICINE

## 2023-03-16 DIAGNOSIS — E03.9 ACQUIRED HYPOTHYROIDISM: ICD-10-CM

## 2023-03-16 RX ORDER — LEVOTHYROXINE SODIUM 0.03 MG/1
TABLET ORAL
Qty: 90 TABLET | Refills: 0 | Status: SHIPPED | OUTPATIENT
Start: 2023-03-16

## 2023-03-23 DIAGNOSIS — K21.9 GASTROESOPHAGEAL REFLUX DISEASE, UNSPECIFIED WHETHER ESOPHAGITIS PRESENT: ICD-10-CM

## 2023-03-23 RX ORDER — FAMOTIDINE 20 MG/1
TABLET, FILM COATED ORAL
Qty: 90 TABLET | Refills: 0 | Status: SHIPPED | OUTPATIENT
Start: 2023-03-23

## 2023-05-18 ENCOUNTER — HOSPITAL ENCOUNTER (OUTPATIENT)
Dept: MAMMOGRAPHY | Age: 83
Discharge: HOME | End: 2023-05-18
Attending: FAMILY MEDICINE
Payer: MEDICARE

## 2023-05-18 DIAGNOSIS — Z12.31 ENCOUNTER FOR SCREENING MAMMOGRAM FOR MALIGNANT NEOPLASM OF BREAST: ICD-10-CM

## 2023-05-18 PROCEDURE — 77063 BREAST TOMOSYNTHESIS BI: CPT | Performed by: FAMILY MEDICINE

## 2023-05-18 PROCEDURE — 77067 SCR MAMMO BI INCL CAD: CPT | Performed by: FAMILY MEDICINE

## 2023-05-24 ENCOUNTER — LAB ENCOUNTER (OUTPATIENT)
Dept: LAB | Age: 83
End: 2023-05-24
Attending: OTOLARYNGOLOGY
Payer: MEDICARE

## 2023-05-24 DIAGNOSIS — E04.1 NONTOXIC UNINODULAR GOITER: ICD-10-CM

## 2023-05-24 DIAGNOSIS — E07.9 DISEASE OF THYROID GLAND: Primary | ICD-10-CM

## 2023-05-24 LAB
T4 FREE SERPL-MCNC: 1.1 NG/DL (ref 0.8–1.7)
TSI SER-ACNC: 1.98 MIU/ML (ref 0.36–3.74)

## 2023-05-24 PROCEDURE — 36415 COLL VENOUS BLD VENIPUNCTURE: CPT

## 2023-05-24 PROCEDURE — 84439 ASSAY OF FREE THYROXINE: CPT

## 2023-05-24 PROCEDURE — 84443 ASSAY THYROID STIM HORMONE: CPT

## 2023-06-01 ENCOUNTER — HOSPITAL ENCOUNTER (OUTPATIENT)
Dept: ULTRASOUND IMAGING | Age: 83
Discharge: HOME | End: 2023-06-01
Attending: OTOLARYNGOLOGY
Payer: MEDICARE

## 2023-06-01 DIAGNOSIS — E04.1 THYROID NODULE: ICD-10-CM

## 2023-06-01 DIAGNOSIS — E07.9 THYROID DYSFUNCTION: ICD-10-CM

## 2023-06-01 PROCEDURE — 76536 US EXAM OF HEAD AND NECK: CPT | Performed by: OTOLARYNGOLOGY

## 2023-06-02 DIAGNOSIS — E03.9 ACQUIRED HYPOTHYROIDISM: ICD-10-CM

## 2023-06-02 RX ORDER — LEVOTHYROXINE SODIUM 0.03 MG/1
TABLET ORAL
Qty: 90 TABLET | Refills: 0 | Status: SHIPPED | OUTPATIENT
Start: 2023-06-02

## 2023-06-29 ENCOUNTER — HOSPITAL ENCOUNTER (OUTPATIENT)
Dept: ULTRASOUND IMAGING | Facility: HOSPITAL | Age: 83
Discharge: HOME | End: 2023-06-29
Attending: OTOLARYNGOLOGY
Payer: MEDICARE

## 2023-06-29 DIAGNOSIS — E04.1 THYROID NODULE: ICD-10-CM

## 2023-06-29 PROCEDURE — 88173 CYTOPATH EVAL FNA REPORT: CPT | Performed by: OTOLARYNGOLOGY

## 2023-06-29 PROCEDURE — 10005 FNA BX W/US GDN 1ST LES: CPT | Performed by: OTOLARYNGOLOGY

## 2023-07-23 DIAGNOSIS — K21.9 GASTROESOPHAGEAL REFLUX DISEASE, UNSPECIFIED WHETHER ESOPHAGITIS PRESENT: ICD-10-CM

## 2023-07-24 RX ORDER — FAMOTIDINE 20 MG/1
TABLET, FILM COATED ORAL
Qty: 90 TABLET | Refills: 0 | Status: SHIPPED | OUTPATIENT
Start: 2023-07-24

## 2023-09-19 ENCOUNTER — TELEPHONE (OUTPATIENT)
Dept: FAMILY MEDICINE CLINIC | Facility: CLINIC | Age: 83
End: 2023-09-19

## 2023-09-19 NOTE — TELEPHONE ENCOUNTER
[facility] calling. Last dexa was 9/19/22, asking if OK for patient to get another dexa this year.

## 2023-09-20 ENCOUNTER — OFFICE VISIT (OUTPATIENT)
Dept: FAMILY MEDICINE CLINIC | Facility: CLINIC | Age: 83
End: 2023-09-20
Payer: MEDICARE

## 2023-09-20 VITALS
BODY MASS INDEX: 26.01 KG/M2 | SYSTOLIC BLOOD PRESSURE: 120 MMHG | RESPIRATION RATE: 16 BRPM | HEART RATE: 86 BPM | HEIGHT: 60.75 IN | DIASTOLIC BLOOD PRESSURE: 72 MMHG | WEIGHT: 136 LBS | OXYGEN SATURATION: 98 %

## 2023-09-20 DIAGNOSIS — E78.2 MIXED HYPERLIPIDEMIA: ICD-10-CM

## 2023-09-20 DIAGNOSIS — E04.2 MULTIPLE THYROID NODULES: ICD-10-CM

## 2023-09-20 DIAGNOSIS — N81.4 UTERINE PROLAPSE: ICD-10-CM

## 2023-09-20 DIAGNOSIS — Z13.31 DEPRESSION SCREENING: ICD-10-CM

## 2023-09-20 DIAGNOSIS — H26.9 CATARACT OF BOTH EYES, UNSPECIFIED CATARACT TYPE: ICD-10-CM

## 2023-09-20 DIAGNOSIS — Z87.898 HISTORY OF PALPITATIONS: ICD-10-CM

## 2023-09-20 DIAGNOSIS — K44.9 HIATAL HERNIA: ICD-10-CM

## 2023-09-20 DIAGNOSIS — E55.9 VITAMIN D DEFICIENCY: ICD-10-CM

## 2023-09-20 DIAGNOSIS — G47.33 OSA (OBSTRUCTIVE SLEEP APNEA): ICD-10-CM

## 2023-09-20 DIAGNOSIS — I83.93 VARICOSE VEINS OF BOTH LOWER EXTREMITIES, UNSPECIFIED WHETHER COMPLICATED: ICD-10-CM

## 2023-09-20 DIAGNOSIS — I34.1 MITRAL VALVE PROLAPSE: ICD-10-CM

## 2023-09-20 DIAGNOSIS — E03.9 ACQUIRED HYPOTHYROIDISM: ICD-10-CM

## 2023-09-20 DIAGNOSIS — I77.6 VASCULITIS ON SKIN BIOPSY (HCC): ICD-10-CM

## 2023-09-20 DIAGNOSIS — M81.6 LOCALIZED OSTEOPOROSIS, UNSPECIFIED PATHOLOGICAL FRACTURE PRESENCE: ICD-10-CM

## 2023-09-20 DIAGNOSIS — F40.01 AGORAPHOBIA WITH PANIC DISORDER: ICD-10-CM

## 2023-09-20 DIAGNOSIS — K57.30 DIVERTICULOSIS OF LARGE INTESTINE WITHOUT HEMORRHAGE: ICD-10-CM

## 2023-09-20 DIAGNOSIS — Z71.85 VACCINE COUNSELING: ICD-10-CM

## 2023-09-20 DIAGNOSIS — K64.8 INTERNAL AND EXTERNAL HEMORRHOIDS WITHOUT COMPLICATION: ICD-10-CM

## 2023-09-20 DIAGNOSIS — M31.0 LEUKOCYTOCLASTIC VASCULITIS (HCC): ICD-10-CM

## 2023-09-20 DIAGNOSIS — K21.9 GASTROESOPHAGEAL REFLUX DISEASE, UNSPECIFIED WHETHER ESOPHAGITIS PRESENT: ICD-10-CM

## 2023-09-20 DIAGNOSIS — K64.4 INTERNAL AND EXTERNAL HEMORRHOIDS WITHOUT COMPLICATION: ICD-10-CM

## 2023-09-20 DIAGNOSIS — J30.9 ALLERGIC RHINITIS, UNSPECIFIED SEASONALITY, UNSPECIFIED TRIGGER: ICD-10-CM

## 2023-09-20 DIAGNOSIS — Z78.0 MENOPAUSE: ICD-10-CM

## 2023-09-20 DIAGNOSIS — E53.8 VITAMIN B12 DEFICIENCY: ICD-10-CM

## 2023-09-20 DIAGNOSIS — Z12.4 SCREENING FOR MALIGNANT NEOPLASM OF CERVIX: ICD-10-CM

## 2023-09-20 DIAGNOSIS — Z01.419 ENCOUNTER FOR GYNECOLOGICAL EXAMINATION WITHOUT ABNORMAL FINDING: ICD-10-CM

## 2023-09-20 DIAGNOSIS — Z00.00 ENCOUNTER FOR ANNUAL HEALTH EXAMINATION: Primary | ICD-10-CM

## 2023-09-20 DIAGNOSIS — Z86.39 HISTORY OF VITAMIN D DEFICIENCY: ICD-10-CM

## 2023-09-20 DIAGNOSIS — L30.9 ECZEMA, UNSPECIFIED TYPE: ICD-10-CM

## 2023-09-20 DIAGNOSIS — Z79.899 MEDICATION MANAGEMENT: ICD-10-CM

## 2023-09-20 DIAGNOSIS — I10 ESSENTIAL HYPERTENSION: ICD-10-CM

## 2023-09-20 DIAGNOSIS — M15.9 PRIMARY OSTEOARTHRITIS INVOLVING MULTIPLE JOINTS: ICD-10-CM

## 2023-09-20 DIAGNOSIS — I65.23 BILATERAL CAROTID ARTERY STENOSIS: ICD-10-CM

## 2023-09-20 LAB
ALBUMIN SERPL-MCNC: 3.8 G/DL (ref 3.4–5)
ALBUMIN/GLOB SERPL: 1.2 {RATIO} (ref 1–2)
ALP LIVER SERPL-CCNC: 89 U/L
ALT SERPL-CCNC: 24 U/L
ANION GAP SERPL CALC-SCNC: 6 MMOL/L (ref 0–18)
AST SERPL-CCNC: 15 U/L (ref 15–37)
BILIRUB SERPL-MCNC: 0.6 MG/DL (ref 0.1–2)
BILIRUB UR QL STRIP.AUTO: NEGATIVE
BUN BLD-MCNC: 15 MG/DL (ref 7–18)
CALCIUM BLD-MCNC: 9.1 MG/DL (ref 8.5–10.1)
CHLORIDE SERPL-SCNC: 109 MMOL/L (ref 98–112)
CHOLEST SERPL-MCNC: 169 MG/DL (ref ?–200)
CLARITY UR REFRACT.AUTO: CLEAR
CO2 SERPL-SCNC: 25 MMOL/L (ref 21–32)
CREAT BLD-MCNC: 0.92 MG/DL
EGFRCR SERPLBLD CKD-EPI 2021: 63 ML/MIN/1.73M2 (ref 60–?)
FASTING PATIENT LIPID ANSWER: NO
FASTING STATUS PATIENT QL REPORTED: NO
GLOBULIN PLAS-MCNC: 3.2 G/DL (ref 2.8–4.4)
GLUCOSE BLD-MCNC: 103 MG/DL (ref 70–99)
GLUCOSE UR STRIP.AUTO-MCNC: NORMAL MG/DL
HDLC SERPL-MCNC: 59 MG/DL (ref 40–59)
KETONES UR STRIP.AUTO-MCNC: NEGATIVE MG/DL
LDLC SERPL CALC-MCNC: 80 MG/DL (ref ?–100)
LEUKOCYTE ESTERASE UR QL STRIP.AUTO: 75
NONHDLC SERPL-MCNC: 110 MG/DL (ref ?–130)
OSMOLALITY SERPL CALC.SUM OF ELEC: 291 MOSM/KG (ref 275–295)
PH UR STRIP.AUTO: 5 [PH] (ref 5–8)
POTASSIUM SERPL-SCNC: 4.3 MMOL/L (ref 3.5–5.1)
PROT SERPL-MCNC: 7 G/DL (ref 6.4–8.2)
PROT UR STRIP.AUTO-MCNC: NEGATIVE MG/DL
SODIUM SERPL-SCNC: 140 MMOL/L (ref 136–145)
SP GR UR STRIP.AUTO: 1.01 (ref 1–1.03)
TRIGL SERPL-MCNC: 181 MG/DL (ref 30–149)
UROBILINOGEN UR STRIP.AUTO-MCNC: NORMAL MG/DL
VIT D+METAB SERPL-MCNC: 33.9 NG/ML (ref 30–100)
VLDLC SERPL CALC-MCNC: 28 MG/DL (ref 0–30)

## 2023-09-20 PROCEDURE — G0444 DEPRESSION SCREEN ANNUAL: HCPCS | Performed by: FAMILY MEDICINE

## 2023-09-20 PROCEDURE — 80061 LIPID PANEL: CPT | Performed by: FAMILY MEDICINE

## 2023-09-20 PROCEDURE — 99214 OFFICE O/P EST MOD 30 MIN: CPT | Performed by: FAMILY MEDICINE

## 2023-09-20 PROCEDURE — 1126F AMNT PAIN NOTED NONE PRSNT: CPT | Performed by: FAMILY MEDICINE

## 2023-09-20 PROCEDURE — 80053 COMPREHEN METABOLIC PANEL: CPT | Performed by: FAMILY MEDICINE

## 2023-09-20 PROCEDURE — 81001 URINALYSIS AUTO W/SCOPE: CPT | Performed by: FAMILY MEDICINE

## 2023-09-20 PROCEDURE — G0101 CA SCREEN;PELVIC/BREAST EXAM: HCPCS | Performed by: FAMILY MEDICINE

## 2023-09-20 PROCEDURE — G0439 PPPS, SUBSEQ VISIT: HCPCS | Performed by: FAMILY MEDICINE

## 2023-09-20 PROCEDURE — 82306 VITAMIN D 25 HYDROXY: CPT | Performed by: FAMILY MEDICINE

## 2023-09-20 PROCEDURE — 88175 CYTOPATH C/V AUTO FLUID REDO: CPT | Performed by: FAMILY MEDICINE

## 2023-09-20 NOTE — TELEPHONE ENCOUNTER
Clarification  You want dexa in 2 years? An order was placed 1/18/23  Thank you     LOV 3/8/23  Dexa is UTD.     9/19/23 Last dexa   CONCLUSION:  Bone mineral density values for lumbar spine, total hip, and left femoral neck are compatible with the diagnosis of osteopenia by WHO definition (T score between -1.0 and -2.5). If therapy is initiated, follow-up study is recommended in 2   years for further evaluation of therapeutic efficacy.

## 2023-09-21 ENCOUNTER — PATIENT MESSAGE (OUTPATIENT)
Dept: FAMILY MEDICINE CLINIC | Facility: CLINIC | Age: 83
End: 2023-09-21

## 2023-09-21 NOTE — TELEPHONE ENCOUNTER
From: Abran Caro  To: Elaina Gomez  Sent: 9/21/2023 11:46 AM CDT  Subject: Question regarding COMP METABOLIC PANEL (14)    Are there any numbers in my blood tests that indicate I should take action to improve any areas? Thank you!

## 2023-09-22 ENCOUNTER — LAB ENCOUNTER (OUTPATIENT)
Dept: LAB | Facility: HOSPITAL | Age: 83
End: 2023-09-22
Attending: FAMILY MEDICINE
Payer: MEDICARE

## 2023-09-22 DIAGNOSIS — N39.0 URINARY TRACT INFECTION WITHOUT HEMATURIA, SITE UNSPECIFIED: ICD-10-CM

## 2023-09-22 PROCEDURE — 87086 URINE CULTURE/COLONY COUNT: CPT

## 2023-09-22 PROCEDURE — 87088 URINE BACTERIA CULTURE: CPT

## 2023-09-22 PROCEDURE — 87186 SC STD MICRODIL/AGAR DIL: CPT

## 2023-09-25 ENCOUNTER — PATIENT MESSAGE (OUTPATIENT)
Dept: FAMILY MEDICINE CLINIC | Facility: CLINIC | Age: 83
End: 2023-09-25

## 2023-09-25 DIAGNOSIS — R31.9 URINARY TRACT INFECTION WITH HEMATURIA, SITE UNSPECIFIED: Primary | ICD-10-CM

## 2023-09-25 DIAGNOSIS — N39.0 URINARY TRACT INFECTION WITH HEMATURIA, SITE UNSPECIFIED: Primary | ICD-10-CM

## 2023-09-25 NOTE — TELEPHONE ENCOUNTER
From: Keya Bran  To: Elaina Gomez  Sent: 9/25/2023 6:58 AM CDT  Subject: Question regarding Dr. Carmelo Serrato. Does the [facility] Urine number\" need to be \"addressed\"? Thank you!     Minerva Hameed

## 2023-10-13 ENCOUNTER — OFFICE VISIT (OUTPATIENT)
Dept: CARDIOLOGY | Age: 83
End: 2023-10-13

## 2023-10-13 VITALS
WEIGHT: 136 LBS | HEIGHT: 62 IN | BODY MASS INDEX: 25.03 KG/M2 | SYSTOLIC BLOOD PRESSURE: 114 MMHG | HEART RATE: 73 BPM | DIASTOLIC BLOOD PRESSURE: 80 MMHG

## 2023-10-13 DIAGNOSIS — I83.813 VARICOSE VEINS OF BILATERAL LOWER EXTREMITIES WITH PAIN: ICD-10-CM

## 2023-10-13 DIAGNOSIS — I34.1 MITRAL VALVE PROLAPSE: ICD-10-CM

## 2023-10-13 DIAGNOSIS — Z87.898 HISTORY OF PALPITATIONS: ICD-10-CM

## 2023-10-13 DIAGNOSIS — I65.23 ASYMPTOMATIC BILATERAL CAROTID ARTERY STENOSIS: Primary | ICD-10-CM

## 2023-10-13 DIAGNOSIS — E78.5 DYSLIPIDEMIA: ICD-10-CM

## 2023-10-13 PROCEDURE — 99214 OFFICE O/P EST MOD 30 MIN: CPT | Performed by: INTERNAL MEDICINE

## 2023-10-13 RX ORDER — ATORVASTATIN CALCIUM 40 MG/1
TABLET, FILM COATED ORAL
Qty: 90 TABLET | Refills: 3 | Status: SHIPPED | OUTPATIENT
Start: 2023-10-13

## 2023-10-13 SDOH — HEALTH STABILITY: PHYSICAL HEALTH: ON AVERAGE, HOW MANY DAYS PER WEEK DO YOU ENGAGE IN MODERATE TO STRENUOUS EXERCISE (LIKE A BRISK WALK)?: 0 DAYS

## 2023-10-13 SDOH — HEALTH STABILITY: PHYSICAL HEALTH: ON AVERAGE, HOW MANY MINUTES DO YOU ENGAGE IN EXERCISE AT THIS LEVEL?: 0 MIN

## 2023-10-13 ASSESSMENT — PATIENT HEALTH QUESTIONNAIRE - PHQ9
SUM OF ALL RESPONSES TO PHQ9 QUESTIONS 1 AND 2: 0
1. LITTLE INTEREST OR PLEASURE IN DOING THINGS: NOT AT ALL
SUM OF ALL RESPONSES TO PHQ9 QUESTIONS 1 AND 2: 0
CLINICAL INTERPRETATION OF PHQ2 SCORE: NO FURTHER SCREENING NEEDED
2. FEELING DOWN, DEPRESSED OR HOPELESS: NOT AT ALL

## 2023-10-14 ENCOUNTER — PATIENT MESSAGE (OUTPATIENT)
Dept: FAMILY MEDICINE CLINIC | Facility: CLINIC | Age: 83
End: 2023-10-14

## 2023-10-16 ENCOUNTER — TELEPHONE (OUTPATIENT)
Dept: CARDIOLOGY | Age: 83
End: 2023-10-16

## 2023-10-16 ENCOUNTER — E-ADVICE (OUTPATIENT)
Dept: CARDIOLOGY | Age: 83
End: 2023-10-16

## 2023-10-16 NOTE — TELEPHONE ENCOUNTER
From: Karl Greene  To: Elaina Gomez  Sent: 10/14/2023 2:26 PM CDT  Subject: Question regarding LIPID PANEL    How can I lower my high numbers?

## 2023-11-07 ENCOUNTER — LAB ENCOUNTER (OUTPATIENT)
Dept: LAB | Age: 83
End: 2023-11-07
Attending: FAMILY MEDICINE
Payer: MEDICARE

## 2023-11-07 DIAGNOSIS — R31.9 URINARY TRACT INFECTION WITH HEMATURIA, SITE UNSPECIFIED: ICD-10-CM

## 2023-11-07 DIAGNOSIS — N39.0 URINARY TRACT INFECTION WITH HEMATURIA, SITE UNSPECIFIED: ICD-10-CM

## 2023-11-07 PROCEDURE — 87186 SC STD MICRODIL/AGAR DIL: CPT

## 2023-11-07 PROCEDURE — 87088 URINE BACTERIA CULTURE: CPT

## 2023-11-07 PROCEDURE — 87086 URINE CULTURE/COLONY COUNT: CPT

## 2023-11-10 RX ORDER — CEPHALEXIN 250 MG/1
250 CAPSULE ORAL 2 TIMES DAILY
Qty: 14 CAPSULE | Refills: 0 | Status: SHIPPED | OUTPATIENT
Start: 2023-11-10 | End: 2023-11-17

## 2023-11-13 ENCOUNTER — PATIENT MESSAGE (OUTPATIENT)
Dept: FAMILY MEDICINE CLINIC | Facility: CLINIC | Age: 83
End: 2023-11-13

## 2023-11-14 NOTE — TELEPHONE ENCOUNTER
From: Atif Adam  To: Elaina Gomez  Sent: 11/13/2023 8:27 PM CST  Subject: Drug for my current Urinary Infection is \"Cephalexin\"; 250 MG CAP ASCE. My pharmacy had an order from you for Nitrofurantoin Monohyd Macro 100 MG C. I picked it up tonight. I've had no contact regarding it from your office. I feel better about continuing with my current prescription. I just wanted to touch base with you. I'm going to try to return it to [facility] and see if I can get my $15.84 back.

## 2023-11-14 NOTE — TELEPHONE ENCOUNTER
Left detailed vm for pt with Dr Nighat Dale recommendations from urine culture results 11/10/23:  Stop macrobid. Keflex 250 mg BID x 7 days, repeat urine culture in 2 weeks. Carmela Gunter

## 2023-11-27 ENCOUNTER — HOSPITAL ENCOUNTER (OUTPATIENT)
Dept: ULTRASOUND IMAGING | Age: 83
Discharge: HOME | End: 2023-11-27
Attending: INTERNAL MEDICINE
Payer: MEDICARE

## 2023-11-27 ENCOUNTER — HOSPITAL ENCOUNTER (OUTPATIENT)
Dept: ULTRASOUND IMAGING | Age: 83
Discharge: HOME | End: 2023-11-27
Attending: PHYSICIAN ASSISTANT
Payer: MEDICARE

## 2023-11-27 DIAGNOSIS — I65.23 ASYMPTOMATIC BILATERAL CAROTID ARTERY STENOSIS: ICD-10-CM

## 2023-11-27 DIAGNOSIS — Z87.898 HISTORY OF PALPITATIONS: ICD-10-CM

## 2023-11-27 DIAGNOSIS — E78.5 DYSLIPIDEMIA: ICD-10-CM

## 2023-11-27 DIAGNOSIS — I83.813 VARICOSE VEINS OF BOTH LOWER EXTREMITIES WITH PAIN: ICD-10-CM

## 2023-11-27 DIAGNOSIS — E04.2 MULTIPLE THYROID NODULES: ICD-10-CM

## 2023-11-27 DIAGNOSIS — I34.1 MITRAL VALVE PROLAPSE: ICD-10-CM

## 2023-11-27 PROCEDURE — 93880 EXTRACRANIAL BILAT STUDY: CPT | Performed by: INTERNAL MEDICINE

## 2023-11-27 PROCEDURE — 76536 US EXAM OF HEAD AND NECK: CPT | Performed by: PHYSICIAN ASSISTANT

## 2024-01-12 ENCOUNTER — APPOINTMENT (OUTPATIENT)
Dept: CARDIOLOGY | Age: 84
End: 2024-01-12

## 2024-01-12 ENCOUNTER — TELEPHONIC VISIT (OUTPATIENT)
Dept: CARDIOLOGY | Age: 84
End: 2024-01-12

## 2024-01-12 DIAGNOSIS — I10 ESSENTIAL HYPERTENSION: ICD-10-CM

## 2024-01-12 DIAGNOSIS — Z87.898 HISTORY OF PALPITATIONS: ICD-10-CM

## 2024-01-12 DIAGNOSIS — I65.23 ASYMPTOMATIC BILATERAL CAROTID ARTERY STENOSIS: Primary | ICD-10-CM

## 2024-01-12 PROCEDURE — 99214 OFFICE O/P EST MOD 30 MIN: CPT | Performed by: INTERNAL MEDICINE

## 2024-03-20 ENCOUNTER — OFFICE VISIT (OUTPATIENT)
Dept: FAMILY MEDICINE CLINIC | Facility: CLINIC | Age: 84
End: 2024-03-20
Payer: MEDICARE

## 2024-03-20 VITALS
HEART RATE: 96 BPM | WEIGHT: 135 LBS | HEIGHT: 60.75 IN | OXYGEN SATURATION: 99 % | RESPIRATION RATE: 16 BRPM | SYSTOLIC BLOOD PRESSURE: 124 MMHG | DIASTOLIC BLOOD PRESSURE: 70 MMHG | BODY MASS INDEX: 25.82 KG/M2

## 2024-03-20 DIAGNOSIS — E53.8 VITAMIN B12 DEFICIENCY: ICD-10-CM

## 2024-03-20 DIAGNOSIS — I65.23 BILATERAL CAROTID ARTERY STENOSIS: ICD-10-CM

## 2024-03-20 DIAGNOSIS — I83.93 VARICOSE VEINS OF BOTH LOWER EXTREMITIES, UNSPECIFIED WHETHER COMPLICATED: ICD-10-CM

## 2024-03-20 DIAGNOSIS — M81.6 LOCALIZED OSTEOPOROSIS, UNSPECIFIED PATHOLOGICAL FRACTURE PRESENCE: ICD-10-CM

## 2024-03-20 DIAGNOSIS — F40.01 AGORAPHOBIA WITH PANIC DISORDER: ICD-10-CM

## 2024-03-20 DIAGNOSIS — K44.9 HIATAL HERNIA: ICD-10-CM

## 2024-03-20 DIAGNOSIS — G47.33 OSA (OBSTRUCTIVE SLEEP APNEA): ICD-10-CM

## 2024-03-20 DIAGNOSIS — L95.9 VASCULITIS ON SKIN BIOPSY: ICD-10-CM

## 2024-03-20 DIAGNOSIS — I10 ESSENTIAL HYPERTENSION: ICD-10-CM

## 2024-03-20 DIAGNOSIS — M15.9 PRIMARY OSTEOARTHRITIS INVOLVING MULTIPLE JOINTS: ICD-10-CM

## 2024-03-20 DIAGNOSIS — H26.9 CATARACT OF BOTH EYES, UNSPECIFIED CATARACT TYPE: ICD-10-CM

## 2024-03-20 DIAGNOSIS — Z12.31 ENCOUNTER FOR SCREENING MAMMOGRAM FOR MALIGNANT NEOPLASM OF BREAST: ICD-10-CM

## 2024-03-20 DIAGNOSIS — E03.9 ACQUIRED HYPOTHYROIDISM: ICD-10-CM

## 2024-03-20 DIAGNOSIS — E78.2 MIXED HYPERLIPIDEMIA: Primary | ICD-10-CM

## 2024-03-20 DIAGNOSIS — I34.1 MITRAL VALVE PROLAPSE: ICD-10-CM

## 2024-03-20 DIAGNOSIS — K21.9 GASTROESOPHAGEAL REFLUX DISEASE, UNSPECIFIED WHETHER ESOPHAGITIS PRESENT: ICD-10-CM

## 2024-03-20 DIAGNOSIS — Z13.0 SCREENING FOR DEFICIENCY ANEMIA: ICD-10-CM

## 2024-03-20 DIAGNOSIS — E55.9 VITAMIN D DEFICIENCY: ICD-10-CM

## 2024-03-20 DIAGNOSIS — Z79.899 MEDICATION MANAGEMENT: ICD-10-CM

## 2024-03-20 DIAGNOSIS — K57.30 DIVERTICULOSIS OF LARGE INTESTINE WITHOUT HEMORRHAGE: ICD-10-CM

## 2024-03-20 DIAGNOSIS — E04.2 MULTIPLE THYROID NODULES: ICD-10-CM

## 2024-03-20 DIAGNOSIS — J30.9 ALLERGIC RHINITIS, UNSPECIFIED SEASONALITY, UNSPECIFIED TRIGGER: ICD-10-CM

## 2024-03-20 DIAGNOSIS — Z71.85 VACCINE COUNSELING: ICD-10-CM

## 2024-03-20 PROCEDURE — 99214 OFFICE O/P EST MOD 30 MIN: CPT | Performed by: FAMILY MEDICINE

## 2024-03-20 PROCEDURE — 99499 UNLISTED E&M SERVICE: CPT | Performed by: FAMILY MEDICINE

## 2024-03-20 RX ORDER — ATORVASTATIN CALCIUM 40 MG/1
40 TABLET, FILM COATED ORAL
COMMUNITY
Start: 2024-02-09

## 2024-03-20 NOTE — PROGRESS NOTES
Minerva Hameed is a 83 year old female.  HPI:   Pt. Is here for med check.    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  HTN -- stable; not currently on meds  Dyslipidemia -- stable on lipitor; no side effects  GERD -- stable on famotadine -- taking only 20 mg at night as needed and no side effects  Osteoporosis -- off alendronate  Hypothyroid -- stable on levothyroxine  Vitamin d def -- has not taken  MINGO-- uses 2 pillows and doing well  Cataract -- stable  Allergies -- stable  Thyroid nodules -- seeing Dr. Stovall  Hiatal hernia and diverticulosis -- stable  Varicose veins -- stable  Vasculitis -- stable  Goes to [facility] -- eye exam annually 2023  MVP -- last echo 12/2020  Last dental exam 11/2020    Meds reviewed  .   Current Outpatient Medications   Medication Sig Dispense Refill    atorvastatin 40 MG Oral Tab Take 1 tablet (40 mg total) by mouth 3 (three) times a week. MONDAY WEDNESDAY AND FRIDAY      valACYclovir 1 G Oral Tab Take 1 tablet (1,000 mg total) by mouth 2 (two) times daily. 10 tablet 0    FAMOTIDINE 20 MG Oral Tab TAKE ONE TABLET BY MOUTH DAILY AS NEEDED 90 tablet 0    LEVOTHYROXINE 25 MCG Oral Tab TAKE ONE TABLET BY MOUTH ONCE DAILY BEFORE BREAKFAST 90 tablet 0    Sodium Chloride-Xylitol (XLEAR SINUS CARE SPRAY NA) by Nasal route. Using as needed         Allergies   Allergen Reactions    Sulfa Antibiotics RASH and OTHER (SEE COMMENTS)     redness    Allergy      Denied Adhesive Tape      Ciprofloxacin RASH    Contrast Dye [Gadolinium Derivatives]      Iodinated Contrast Media      Doxycycline OTHER (SEE COMMENTS)     Abdominal cramps    Lansoprazole PALPITATIONS    Latex RASH    Metoprolol Tartrate UNKNOWN     POWD    Mushrooms     Niacin FACE FLUSHING    Penicillin G RASH    Pravastatin MYALGIA    Simvastatin MYALGIA    Sulfa Drugs Cross Reactors RASH    Valium      Tabs; Sinus Drainage    Diazepam Runny nose      Past Medical History:   Diagnosis Date    Anxiety state,  unspecified 1995 and 1993    Dizziness and giddiness     Dysfunction of eustachian tube     Dyspepsia and other specified disorders of function of stomach     Dysphagia, unspecified(787.20)     Heartburn     MVP (mitral valve prolapse)     Obstructive sleep apnea (adult) (pediatric) ED[facility] PSG-9/14/17    AHI 40, O2 nagi 78%, Oral applaince with no sig change in the patient's MINGO.  New treatment with positonal tx.     Personal history of pneumonia (recurrent)     walking pneumonia    Screening for malignant neoplasm of the cervix 1/27/2012    SUREPATH PAP    Unspecified disorder of thyroid     Unspecified sinusitis (chronic)     Unspecified urinary incontinence       Social History:  Social History     Socioeconomic History    Marital status:    Tobacco Use    Smoking status: Never    Smokeless tobacco: Never   Vaping Use    Vaping Use: Never used   Substance and Sexual Activity    Alcohol use: Yes     Alcohol/week: 0.0 standard drinks of alcohol     Comment: 1-2 drinks/month    Drug use: No   Other Topics Concern    Caffeine Concern No     Comment: none    Exercise No     Comment: active        Results for orders placed or performed in visit on 11/07/23   Urine Culture, Routine [E]    Specimen: Urine, clean catch   Result Value Ref Range    Urine Culture 10,000 - 50,000 CFU/ML Proteus mirabilis (A)        Susceptibility    Proteus mirabilis -  (no method available)     Ampicillin <=2 Sensitive      Cefazolin <=4 Sensitive      Ciprofloxacin <=0.25 Sensitive      Gentamicin <=1 Sensitive      Meropenem <=0.25 Sensitive      Levofloxacin <=0.12 Sensitive      Nitrofurantoin 128 Resistant      Piperacillin + Tazobactam <=4 Sensitive      Trimethoprim/Sulfa <=20 Sensitive        REVIEW OF SYSTEMS:   GENERAL: feels well otherwise  SKIN: denies any unusual skin lesions  EYES:denies blurred vision or double vision; cataract stable  HEENT: denies nasal congestion, sinus pain or ST  LUNGS: denies shortness of breath  with exertion  CARDIOVASCULAR: denies chest pain on exertion  GI: denies abdominal pain,denies heartburn  :  dysuria   MUSCULOSKELETAL: denies back pain  NEURO: denies headaches  PSYCHE: denies depression or anxiety  HEMATOLOGIC: denies hx of anemia  ENDOCRINE: thyroid history  ALL/ASTHMA: denies hx of allergy or asthma    EXAM:   /70 (BP Location: Left arm, Patient Position: Sitting, Cuff Size: adult)   Pulse 96   Resp 16   Ht 5' 0.75\" (1.543 m)   Wt 135 lb (61.2 kg)   LMP 01/01/1992   SpO2 99%   BMI 25.72 kg/m²   GENERAL: well developed, well nourished,in no apparent distress  PSYCHE: normal mood and affect  SKIN: no rashes,no suspicious lesions  NECK: supple,no adenopathy,no bruits, thyroid nodules -- see ENT  LUNGS: clear to auscultation  CARDIO: RRR without murmur  GI: good BS's,no masses, HSM or tenderness  EXTREMITIES: no cyanosis, clubbing or edema; arthritis noted in both hands but worst in her thumbs    ASSESSMENT AND PLAN:     Encounter Diagnoses   Name Primary?    Mixed hyperlipidemia Yes    Essential hypertension     Acquired hypothyroidism     Vitamin D deficiency     Gastroesophageal reflux disease, unspecified whether esophagitis present     Vasculitis on skin biopsy     Multiple thyroid nodules     Localized osteoporosis, unspecified pathological fracture presence     MINGO (obstructive sleep apnea)     Allergic rhinitis, unspecified seasonality, unspecified trigger     Varicose veins of both lower extremities, unspecified whether complicated     Agoraphobia with panic disorder     Vitamin B12 deficiency     Mitral valve prolapse     Bilateral carotid artery stenosis     Primary osteoarthritis involving multiple joints     Screening for deficiency anemia     Encounter for screening mammogram for malignant neoplasm of breast     Cataract of both eyes, unspecified cataract type     Diverticulosis of large intestine without hemorrhage     Hiatal hernia     Medication management     Vaccine  counseling        Orders Placed This Encounter   Procedures    Comp Metabolic Panel (14)    CBC With Differential With Platelet    Lipid Panel    Vitamin D, 25-Hydroxy    Urinalysis, Routine    Free T4, (Free Thyroxine)    Assay, Thyroid Stim Hormone    Vitamin B12       Meds & Refills for this Visit:  Requested Prescriptions      No prescriptions requested or ordered in this encounter       Imaging & Consults:  [facility] EZRA 2D+3D SCREENING BILAT (CPT=77067/00191)       HTN -- stable; not currently on meds  Dyslipidemia -- stable on lipitor; no side effects  GERD -- stable on famotadine -- taking only 20 mg at night prn and no side effects  Osteoporosis -- no meds; would like to monitor  Hypothyroid -- stable on levothyroxine  Vitamin d def -- not taking anything other than MVI  MINGO-- stable on pillows  Cataract -- stable  Allergies -- stable  Thyroid nodules -- Dr. Stovall  Hiatal hernia and diverticulosis/hemorrhoids -- stable  Varicose veins -- stable  Anxiety -- stable  Leukocytoclastic vasculitis-Per dermatology  Vitamin B12 -- advised 1000 mcg daily  Mitral valve prolapse- last echo 12/202 and stable  Bilateral carotid stenosis --  Stable; last US 9/2022  Dexa is UTD.   Mammo UTD.  Advised shingrix and TdaP through the pharmacy.  Advised COVID vaccine.       The patient indicates understanding of these issues and agrees to the plan.  The patient is asked to return in Return in about 6 months (around 9/20/2024) for AWV/Med check.  .

## 2024-03-21 ENCOUNTER — LAB ENCOUNTER (OUTPATIENT)
Dept: LAB | Age: 84
End: 2024-03-21
Attending: FAMILY MEDICINE
Payer: MEDICARE

## 2024-03-21 DIAGNOSIS — R73.9 HYPERGLYCEMIA: ICD-10-CM

## 2024-03-21 DIAGNOSIS — I10 ESSENTIAL HYPERTENSION: ICD-10-CM

## 2024-03-21 DIAGNOSIS — E78.2 MIXED HYPERLIPIDEMIA: ICD-10-CM

## 2024-03-21 DIAGNOSIS — E55.9 VITAMIN D DEFICIENCY: ICD-10-CM

## 2024-03-21 DIAGNOSIS — E03.9 ACQUIRED HYPOTHYROIDISM: ICD-10-CM

## 2024-03-21 DIAGNOSIS — Z13.0 SCREENING FOR DEFICIENCY ANEMIA: ICD-10-CM

## 2024-03-21 DIAGNOSIS — E53.8 VITAMIN B12 DEFICIENCY: ICD-10-CM

## 2024-03-21 LAB
ALBUMIN SERPL-MCNC: 4.1 G/DL (ref 3.4–5)
ALBUMIN/GLOB SERPL: 1.2 {RATIO} (ref 1–2)
ALP LIVER SERPL-CCNC: 88 U/L
ALT SERPL-CCNC: 22 U/L
ANION GAP SERPL CALC-SCNC: 4 MMOL/L (ref 0–18)
AST SERPL-CCNC: 20 U/L (ref 15–37)
BASOPHILS # BLD AUTO: 0.04 X10(3) UL (ref 0–0.2)
BASOPHILS NFR BLD AUTO: 0.6 %
BILIRUB SERPL-MCNC: 0.9 MG/DL (ref 0.1–2)
BILIRUB UR QL STRIP.AUTO: NEGATIVE
BUN BLD-MCNC: 17 MG/DL (ref 9–23)
CALCIUM BLD-MCNC: 9.5 MG/DL (ref 8.5–10.1)
CHLORIDE SERPL-SCNC: 107 MMOL/L (ref 98–112)
CHOLEST SERPL-MCNC: 170 MG/DL (ref ?–200)
CO2 SERPL-SCNC: 28 MMOL/L (ref 21–32)
CREAT BLD-MCNC: 0.85 MG/DL
EGFRCR SERPLBLD CKD-EPI 2021: 69 ML/MIN/1.73M2 (ref 60–?)
EOSINOPHIL # BLD AUTO: 0.08 X10(3) UL (ref 0–0.7)
EOSINOPHIL NFR BLD AUTO: 1.3 %
ERYTHROCYTE [DISTWIDTH] IN BLOOD BY AUTOMATED COUNT: 12.6 %
FASTING PATIENT LIPID ANSWER: NO
FASTING STATUS PATIENT QL REPORTED: NO
GLOBULIN PLAS-MCNC: 3.5 G/DL (ref 2.8–4.4)
GLUCOSE BLD-MCNC: 111 MG/DL (ref 70–99)
GLUCOSE UR STRIP.AUTO-MCNC: NORMAL MG/DL
HCT VFR BLD AUTO: 44 %
HDLC SERPL-MCNC: 63 MG/DL (ref 40–59)
HGB BLD-MCNC: 14.1 G/DL
IMM GRANULOCYTES # BLD AUTO: 0.01 X10(3) UL (ref 0–1)
IMM GRANULOCYTES NFR BLD: 0.2 %
KETONES UR STRIP.AUTO-MCNC: NEGATIVE MG/DL
LDLC SERPL CALC-MCNC: 80 MG/DL (ref ?–100)
LEUKOCYTE ESTERASE UR QL STRIP.AUTO: 500
LYMPHOCYTES # BLD AUTO: 1.89 X10(3) UL (ref 1–4)
LYMPHOCYTES NFR BLD AUTO: 29.8 %
MCH RBC QN AUTO: 28.8 PG (ref 26–34)
MCHC RBC AUTO-ENTMCNC: 32 G/DL (ref 31–37)
MCV RBC AUTO: 89.8 FL
MONOCYTES # BLD AUTO: 0.63 X10(3) UL (ref 0.1–1)
MONOCYTES NFR BLD AUTO: 9.9 %
NEUTROPHILS # BLD AUTO: 3.7 X10 (3) UL (ref 1.5–7.7)
NEUTROPHILS # BLD AUTO: 3.7 X10(3) UL (ref 1.5–7.7)
NEUTROPHILS NFR BLD AUTO: 58.2 %
NONHDLC SERPL-MCNC: 107 MG/DL (ref ?–130)
OSMOLALITY SERPL CALC.SUM OF ELEC: 290 MOSM/KG (ref 275–295)
PH UR STRIP.AUTO: 5 [PH] (ref 5–8)
PLATELET # BLD AUTO: 301 10(3)UL (ref 150–450)
POTASSIUM SERPL-SCNC: 3.9 MMOL/L (ref 3.5–5.1)
PROT SERPL-MCNC: 7.6 G/DL (ref 6.4–8.2)
PROT UR STRIP.AUTO-MCNC: NEGATIVE MG/DL
RBC # BLD AUTO: 4.9 X10(6)UL
RBC UR QL AUTO: NEGATIVE
SODIUM SERPL-SCNC: 139 MMOL/L (ref 136–145)
SP GR UR STRIP.AUTO: 1.02 (ref 1–1.03)
T4 FREE SERPL-MCNC: 0.9 NG/DL (ref 0.8–1.7)
TRIGL SERPL-MCNC: 159 MG/DL (ref 30–149)
TSI SER-ACNC: 1.46 MIU/ML (ref 0.36–3.74)
UROBILINOGEN UR STRIP.AUTO-MCNC: NORMAL MG/DL
VIT B12 SERPL-MCNC: 490 PG/ML (ref 193–986)
VIT D+METAB SERPL-MCNC: 38.1 NG/ML (ref 30–100)
VLDLC SERPL CALC-MCNC: 25 MG/DL (ref 0–30)
WBC # BLD AUTO: 6.4 X10(3) UL (ref 4–11)
WBC #/AREA URNS AUTO: >50 /HPF

## 2024-03-21 PROCEDURE — 85025 COMPLETE CBC W/AUTO DIFF WBC: CPT

## 2024-03-21 PROCEDURE — 82607 VITAMIN B-12: CPT

## 2024-03-21 PROCEDURE — 81001 URINALYSIS AUTO W/SCOPE: CPT

## 2024-03-21 PROCEDURE — 36415 COLL VENOUS BLD VENIPUNCTURE: CPT

## 2024-03-21 PROCEDURE — 84443 ASSAY THYROID STIM HORMONE: CPT

## 2024-03-21 PROCEDURE — 82306 VITAMIN D 25 HYDROXY: CPT

## 2024-03-21 PROCEDURE — 80061 LIPID PANEL: CPT

## 2024-03-21 PROCEDURE — 84439 ASSAY OF FREE THYROXINE: CPT

## 2024-03-21 PROCEDURE — 80053 COMPREHEN METABOLIC PANEL: CPT

## 2024-03-21 PROCEDURE — 83036 HEMOGLOBIN GLYCOSYLATED A1C: CPT

## 2024-03-22 LAB
EST. AVERAGE GLUCOSE BLD GHB EST-MCNC: 151 MG/DL (ref 68–126)
HBA1C MFR BLD: 6.9 % (ref ?–5.7)

## 2024-03-25 ENCOUNTER — LAB ENCOUNTER (OUTPATIENT)
Dept: LAB | Age: 84
End: 2024-03-25
Attending: FAMILY MEDICINE
Payer: MEDICARE

## 2024-03-25 DIAGNOSIS — R82.90 ABNORMAL URINE: ICD-10-CM

## 2024-03-25 PROCEDURE — 87086 URINE CULTURE/COLONY COUNT: CPT

## 2024-03-25 PROCEDURE — 87077 CULTURE AEROBIC IDENTIFY: CPT

## 2024-03-25 PROCEDURE — 87186 SC STD MICRODIL/AGAR DIL: CPT

## 2024-03-27 DIAGNOSIS — N39.0 ACUTE UTI: Primary | ICD-10-CM

## 2024-03-27 RX ORDER — CEPHALEXIN 250 MG/1
250 CAPSULE ORAL 2 TIMES DAILY
Qty: 14 CAPSULE | Refills: 0 | Status: SHIPPED | OUTPATIENT
Start: 2024-03-27 | End: 2024-04-03

## 2024-03-27 NOTE — PROGRESS NOTES
Covering for Dr. Gomez. Urine culture preliminary results with gram negative rods. Prescribed empiric keflex (patient with allergy to penicillins and contraindication to nitrofurantoin due to creatinine clearance). Patient has apparently tolerated keflex for gram negative UTI in past. Keflex sent to patient's pharmacy on file. Please inform patient.     Jorge Shepherd MD, 03/27/24, 2:10 PM

## 2024-04-09 ENCOUNTER — LAB ENCOUNTER (OUTPATIENT)
Dept: LAB | Age: 84
End: 2024-04-09
Attending: FAMILY MEDICINE
Payer: MEDICARE

## 2024-04-09 ENCOUNTER — TELEPHONE (OUTPATIENT)
Dept: FAMILY MEDICINE CLINIC | Facility: CLINIC | Age: 84
End: 2024-04-09

## 2024-04-09 DIAGNOSIS — N39.0 URINARY TRACT INFECTION WITH HEMATURIA, SITE UNSPECIFIED: ICD-10-CM

## 2024-04-09 DIAGNOSIS — R31.9 URINARY TRACT INFECTION WITH HEMATURIA, SITE UNSPECIFIED: ICD-10-CM

## 2024-04-09 PROCEDURE — 87086 URINE CULTURE/COLONY COUNT: CPT

## 2024-04-09 NOTE — TELEPHONE ENCOUNTER
Last OV: 3/20/24 med check                Next OV: 4/30/24  discuss diabetes    Pos Urine culture 3/27/24, prescribed Keflex.  She finished antibiotic on 4/6/24 with resolution of symptoms until today.  She had an episode of burning after urination today that has now subsided.  Pt advised to push fluids, go to [facility] to recheck urine culture today, helped pt find closest [facility], Pt verbalized understanding to contact office if symptoms worsen before culture results.

## 2024-04-09 NOTE — TELEPHONE ENCOUNTER
Pt called because she finished antibiotics on 4/6.   cephalexin (KEFLEX) 250 MG Oral Cap     Pt called because she is feeling \"excruciating\" pain and burning pain when urinating.

## 2024-04-15 ENCOUNTER — TELEPHONE (OUTPATIENT)
Dept: FAMILY MEDICINE CLINIC | Facility: CLINIC | Age: 84
End: 2024-04-15

## 2024-04-15 NOTE — TELEPHONE ENCOUNTER
Pt returned call re lab results. Pt informed of results and provider's advice that urine culture is normal. Pt verbalizes understanding.

## 2024-04-29 RX ORDER — BENZONATATE 100 MG/1
100 CAPSULE ORAL 3 TIMES DAILY
COMMUNITY
Start: 2024-04-27

## 2024-04-29 RX ORDER — AMOXICILLIN AND CLAVULANATE POTASSIUM 875; 125 MG/1; MG/1
875 TABLET, FILM COATED ORAL
COMMUNITY
Start: 2024-04-27 | End: 2024-05-04

## 2024-04-30 ENCOUNTER — OFFICE VISIT (OUTPATIENT)
Dept: FAMILY MEDICINE CLINIC | Facility: CLINIC | Age: 84
End: 2024-04-30
Payer: MEDICARE

## 2024-04-30 VITALS
OXYGEN SATURATION: 96 % | HEART RATE: 82 BPM | BODY MASS INDEX: 26.01 KG/M2 | RESPIRATION RATE: 16 BRPM | HEIGHT: 60.75 IN | WEIGHT: 136 LBS | SYSTOLIC BLOOD PRESSURE: 136 MMHG | DIASTOLIC BLOOD PRESSURE: 74 MMHG

## 2024-04-30 DIAGNOSIS — E53.8 VITAMIN B12 DEFICIENCY: ICD-10-CM

## 2024-04-30 DIAGNOSIS — H26.9 CATARACT OF BOTH EYES, UNSPECIFIED CATARACT TYPE: ICD-10-CM

## 2024-04-30 DIAGNOSIS — K21.9 GASTROESOPHAGEAL REFLUX DISEASE, UNSPECIFIED WHETHER ESOPHAGITIS PRESENT: ICD-10-CM

## 2024-04-30 DIAGNOSIS — I65.23 BILATERAL CAROTID ARTERY STENOSIS: ICD-10-CM

## 2024-04-30 DIAGNOSIS — R22.0 LUMP ON FACE: ICD-10-CM

## 2024-04-30 DIAGNOSIS — Z79.899 MEDICATION MANAGEMENT: ICD-10-CM

## 2024-04-30 DIAGNOSIS — I10 ESSENTIAL HYPERTENSION: ICD-10-CM

## 2024-04-30 DIAGNOSIS — K44.9 HIATAL HERNIA: ICD-10-CM

## 2024-04-30 DIAGNOSIS — G47.33 OSA (OBSTRUCTIVE SLEEP APNEA): ICD-10-CM

## 2024-04-30 DIAGNOSIS — M81.6 LOCALIZED OSTEOPOROSIS, UNSPECIFIED PATHOLOGICAL FRACTURE PRESENCE: ICD-10-CM

## 2024-04-30 DIAGNOSIS — K57.30 DIVERTICULOSIS OF LARGE INTESTINE WITHOUT HEMORRHAGE: ICD-10-CM

## 2024-04-30 DIAGNOSIS — K64.4 INTERNAL AND EXTERNAL HEMORRHOIDS WITHOUT COMPLICATION: ICD-10-CM

## 2024-04-30 DIAGNOSIS — I83.93 VARICOSE VEINS OF BOTH LOWER EXTREMITIES, UNSPECIFIED WHETHER COMPLICATED: ICD-10-CM

## 2024-04-30 DIAGNOSIS — R05.1 ACUTE COUGH: ICD-10-CM

## 2024-04-30 DIAGNOSIS — K64.8 INTERNAL AND EXTERNAL HEMORRHOIDS WITHOUT COMPLICATION: ICD-10-CM

## 2024-04-30 DIAGNOSIS — Z23 NEED FOR VACCINATION: Primary | ICD-10-CM

## 2024-04-30 DIAGNOSIS — F40.01 AGORAPHOBIA WITH PANIC DISORDER: ICD-10-CM

## 2024-04-30 DIAGNOSIS — E11.9 DIABETES MELLITUS WITH NO COMPLICATION (HCC): ICD-10-CM

## 2024-04-30 DIAGNOSIS — E03.9 ACQUIRED HYPOTHYROIDISM: ICD-10-CM

## 2024-04-30 DIAGNOSIS — I34.1 MITRAL VALVE PROLAPSE: ICD-10-CM

## 2024-04-30 DIAGNOSIS — E55.9 VITAMIN D DEFICIENCY: ICD-10-CM

## 2024-04-30 DIAGNOSIS — E04.2 MULTIPLE THYROID NODULES: ICD-10-CM

## 2024-04-30 DIAGNOSIS — M15.9 PRIMARY OSTEOARTHRITIS INVOLVING MULTIPLE JOINTS: ICD-10-CM

## 2024-04-30 DIAGNOSIS — E78.2 MIXED HYPERLIPIDEMIA: ICD-10-CM

## 2024-04-30 DIAGNOSIS — L95.9 VASCULITIS ON SKIN BIOPSY: ICD-10-CM

## 2024-04-30 DIAGNOSIS — Z71.85 VACCINE COUNSELING: ICD-10-CM

## 2024-04-30 DIAGNOSIS — J30.9 ALLERGIC RHINITIS, UNSPECIFIED SEASONALITY, UNSPECIFIED TRIGGER: ICD-10-CM

## 2024-04-30 PROCEDURE — 90677 PCV20 VACCINE IM: CPT | Performed by: FAMILY MEDICINE

## 2024-04-30 PROCEDURE — 99499 UNLISTED E&M SERVICE: CPT | Performed by: FAMILY MEDICINE

## 2024-04-30 PROCEDURE — 99214 OFFICE O/P EST MOD 30 MIN: CPT | Performed by: FAMILY MEDICINE

## 2024-04-30 PROCEDURE — G0009 ADMIN PNEUMOCOCCAL VACCINE: HCPCS | Performed by: FAMILY MEDICINE

## 2024-04-30 NOTE — PROGRESS NOTES
Minerva Hameed is a 83 year old female.  HPI:   Pt. Is here for med check.  Pt. Was in the ER on Sunday for a cough and given medication and it is helping.  Noted a bump on her right forehead and not sure from what and mildly tender.  Was complaining of loose stool but improved.  Aware she needs to discuss her blood work today.      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  HTN -- stable; not currently on meds  Dyslipidemia -- stable on lipitor; no side effects  GERD -- stable on famotadine -- taking only 20 mg at night as needed and no side effects  Osteoporosis -- off alendronate  Hypothyroid -- stable on levothyroxine  Vitamin d def -- has not taken  MINGO-- uses 2 pillows and doing well  Cataract -- stable  Allergies -- stable  Thyroid nodules -- seeing Dr. Stovall  Hiatal hernia and diverticulosis -- stable  Varicose veins -- stable  Vasculitis -- stable  Goes to [facility] -- eye exam annually 2023  MVP -- last echo 12/2020  Last dental exam 11/2020    Meds reviewed  .   Current Outpatient Medications   Medication Sig Dispense Refill    metFORMIN 500 MG Oral Tab Take 1 tablet (500 mg total) by mouth daily with breakfast. 30 tablet 3    amoxicillin clavulanate 875-125 MG Oral Tab Take 1 tablet (875 mg total) by mouth.      benzonatate 100 MG Oral Cap Take 1 capsule (100 mg total) by mouth 3 (three) times daily.      atorvastatin 40 MG Oral Tab Take 1 tablet (40 mg total) by mouth 3 (three) times a week. MONDAY WEDNESDAY AND FRIDAY      valACYclovir 1 G Oral Tab Take 1 tablet (1,000 mg total) by mouth 2 (two) times daily. 10 tablet 0    FAMOTIDINE 20 MG Oral Tab TAKE ONE TABLET BY MOUTH DAILY AS NEEDED 90 tablet 0    LEVOTHYROXINE 25 MCG Oral Tab TAKE ONE TABLET BY MOUTH ONCE DAILY BEFORE BREAKFAST 90 tablet 0    Sodium Chloride-Xylitol (XLEAR SINUS CARE SPRAY NA) by Nasal route. Using as needed         Allergies   Allergen Reactions    Sulfa Antibiotics RASH and OTHER (SEE COMMENTS)     redness     Allergy      Denied Adhesive Tape      Ciprofloxacin RASH    Contrast Dye [Gadolinium Derivatives]      Iodinated Contrast Media      Doxycycline OTHER (SEE COMMENTS)     Abdominal cramps    Lansoprazole PALPITATIONS    Latex RASH    Metoprolol Tartrate UNKNOWN     POWD    Mushrooms     Niacin FACE FLUSHING    Penicillin G RASH    Pravastatin MYALGIA    Simvastatin MYALGIA    Sulfa Drugs Cross Reactors RASH    Valium      Tabs; Sinus Drainage    Diazepam Runny nose    Peanuts Coughing     vomiting      Past Medical History:    Anxiety state, unspecified    Dizziness and giddiness    Dysfunction of eustachian tube    Dyspepsia and other specified disorders of function of stomach    Dysphagia, unspecified(787.20)    Heartburn    MVP (mitral valve prolapse)    Obstructive sleep apnea (adult) (pediatric)    AHI 40, O2 nagi 78%, Oral applaince with no sig change in the patient's MINGO.  New treatment with positonal tx.     Personal history of pneumonia (recurrent)    walking pneumonia    Screening for malignant neoplasm of the cervix    SUREPATH PAP    Unspecified disorder of thyroid    Unspecified sinusitis (chronic)    Unspecified urinary incontinence      Social History:  Social History     Socioeconomic History    Marital status:    Tobacco Use    Smoking status: Never    Smokeless tobacco: Never   Vaping Use    Vaping status: Never Used   Substance and Sexual Activity    Alcohol use: Yes     Alcohol/week: 0.0 standard drinks of alcohol     Comment: 1-2 drinks/month    Drug use: No   Other Topics Concern    Caffeine Concern No     Comment: none    Exercise No     Comment: active     Social Determinants of Health     Physical Activity: High Risk (10/13/2023)    [facility], [facility]    Exercise Vital Sign     On average, how many days per week do you engage in moderate to strenuous exercise (like a brisk walk)?: 0 days     On average, how many  minutes do you engage in exercise at this level?: 0 min    Received from [facility]    Housing Stability        Results for orders placed or performed in visit on 04/09/24   Urine Culture, Routine [E]    Specimen: Urine, clean catch   Result Value Ref Range    Urine Culture No Growth at 18-24 hrs.        REVIEW OF SYSTEMS:   GENERAL: feels well otherwise  SKIN: denies any unusual skin lesions  EYES:denies blurred vision or double vision; cataract stable  HEENT: denies nasal congestion, sinus pain or ST  LUNGS: denies shortness of breath with exertion  CARDIOVASCULAR: denies chest pain on exertion  GI: denies abdominal pain,denies heartburn  :  dysuria   MUSCULOSKELETAL: denies back pain  NEURO: denies headaches  PSYCHE: denies depression or anxiety  HEMATOLOGIC: denies hx of anemia  ENDOCRINE: thyroid history  ALL/ASTHMA: denies hx of allergy or asthma    EXAM:   /74 (BP Location: Left arm, Patient Position: Sitting, Cuff Size: adult)   Pulse 82   Resp 16   Ht 5' 0.75\" (1.543 m)   Wt 136 lb (61.7 kg)   LMP 01/01/1992   SpO2 96%   BMI 25.91 kg/m²   GENERAL: well developed, well nourished,in no apparent distress  PSYCHE: normal mood and affect  SKIN: no rashes,no suspicious lesions; tender bump on right forehead   NECK: supple,no adenopathy,no bruits, thyroid nodules -- see ENT  LUNGS: clear to auscultation  CARDIO: RRR without murmur  GI: good BS's,no masses, HSM or tenderness  EXTREMITIES: no cyanosis, clubbing or edema  Bilateral barefoot skin diabetic exam is normal, visualized feet and the appearance is normal.  Bilateral monofilament/sensation of both feet is normal.  Pulsation pedal pulse exam of both lower legs/feet is normal as well.       ASSESSMENT AND PLAN:     Encounter Diagnoses   Name Primary?    Diabetes mellitus with no complication (HCC)     Essential hypertension     Mixed hyperlipidemia     Acquired hypothyroidism     Vitamin D deficiency     Gastroesophageal  reflux disease, unspecified whether esophagitis present     Vasculitis on skin biopsy     Multiple thyroid nodules     Localized osteoporosis, unspecified pathological fracture presence     MINGO (obstructive sleep apnea)     Allergic rhinitis, unspecified seasonality, unspecified trigger     Varicose veins of both lower extremities, unspecified whether complicated     Agoraphobia with panic disorder     Vitamin B12 deficiency     Mitral valve prolapse     Bilateral carotid artery stenosis     Primary osteoarthritis involving multiple joints     Cataract of both eyes, unspecified cataract type     Hiatal hernia     Internal and external hemorrhoids without complication     Diverticulosis of large intestine without hemorrhage     Medication management     Vaccine counseling     Need for vaccination Yes    Acute cough     Lump on face        Orders Placed This Encounter   Procedures    Comp Metabolic Panel (14)    Lipid Panel    Hemoglobin A1C    Microalb/Creat Ratio, Random Urine    Prevnar 20 (PCV20) [75006]       Meds & Refills for this Visit:  Requested Prescriptions     Signed Prescriptions Disp Refills    metFORMIN 500 MG Oral Tab 30 tablet 3     Sig: Take 1 tablet (500 mg total) by mouth daily with breakfast.       Imaging & Consults:  PCV20 VACCINE FOR INTRAMUSCULAR USE     DM -- new onset -- start metformin 500 mg daily; repeat labs in 3 months; discussed chance of diarrhea on this medication cand cut down on carbs  Lump on face -- monitor; follow up if does not resolve  Cough -- improving; monitor  HTN -- stable; not currently on meds  Dyslipidemia -- stable on lipitor; no side effects  GERD -- stable on famotadine -- taking only 20 mg at night prn and no side effects  Osteoporosis -- no meds; would like to monitor  Hypothyroid -- stable on levothyroxine  Vitamin d def -- not taking anything other than MVI  MINGO-- stable on pillows  Cataract -- stable  Allergies -- stable  Thyroid nodules --   [place]  Hiatal hernia and diverticulosis/hemorrhoids -- stable  Varicose veins -- stable  Anxiety -- stable  Leukocytoclastic vasculitis-Per dermatology  Vitamin B12 -- advised 1000 mcg daily  Mitral valve prolapse- last echo 12/202 and stable  Bilateral carotid stenosis --  Stable; last US 9/2022  Dexa is UTD.   Mammo UTD.  Advised shingrix and TdaP through the pharmacy.  Advised COVID vaccine and RSV next season.  Given Prevnar 20.         The patient indicates understanding of these issues and agrees to the plan.  The patient is asked to return in Return in about 3 months (around 7/30/2024) for med check.  .

## 2024-06-25 ENCOUNTER — LAB ENCOUNTER (OUTPATIENT)
Dept: LAB | Age: 84
End: 2024-06-25
Attending: OTOLARYNGOLOGY

## 2024-06-25 ENCOUNTER — HOSPITAL ENCOUNTER (OUTPATIENT)
Dept: ULTRASOUND IMAGING | Age: 84
Discharge: HOME | End: 2024-06-25
Attending: OTOLARYNGOLOGY

## 2024-06-25 DIAGNOSIS — E07.9 THYROID DYSFUNCTION: ICD-10-CM

## 2024-06-25 DIAGNOSIS — E04.1 THYROID NODULE: ICD-10-CM

## 2024-06-25 DIAGNOSIS — E04.1 NONTOXIC UNINODULAR GOITER: ICD-10-CM

## 2024-06-25 DIAGNOSIS — E07.9 DISEASE OF THYROID GLAND: Primary | ICD-10-CM

## 2024-06-25 LAB
T4 FREE SERPL-MCNC: 0.9 NG/DL (ref 0.8–1.7)
TSI SER-ACNC: 1.7 MIU/ML (ref 0.36–3.74)

## 2024-06-25 PROCEDURE — 84443 ASSAY THYROID STIM HORMONE: CPT

## 2024-06-25 PROCEDURE — 84439 ASSAY OF FREE THYROXINE: CPT

## 2024-06-25 PROCEDURE — 76536 US EXAM OF HEAD AND NECK: CPT | Performed by: OTOLARYNGOLOGY

## 2024-06-25 PROCEDURE — 36415 COLL VENOUS BLD VENIPUNCTURE: CPT

## 2024-08-06 ENCOUNTER — OFFICE VISIT (OUTPATIENT)
Dept: FAMILY MEDICINE CLINIC | Facility: CLINIC | Age: 84
End: 2024-08-06
Payer: MEDICARE

## 2024-08-06 VITALS
BODY MASS INDEX: 25.1 KG/M2 | OXYGEN SATURATION: 96 % | WEIGHT: 131.25 LBS | HEART RATE: 85 BPM | SYSTOLIC BLOOD PRESSURE: 136 MMHG | HEIGHT: 60.75 IN | RESPIRATION RATE: 16 BRPM | DIASTOLIC BLOOD PRESSURE: 72 MMHG

## 2024-08-06 DIAGNOSIS — K21.9 GASTROESOPHAGEAL REFLUX DISEASE, UNSPECIFIED WHETHER ESOPHAGITIS PRESENT: ICD-10-CM

## 2024-08-06 DIAGNOSIS — I83.93 VARICOSE VEINS OF BOTH LOWER EXTREMITIES, UNSPECIFIED WHETHER COMPLICATED: ICD-10-CM

## 2024-08-06 DIAGNOSIS — K64.4 INTERNAL AND EXTERNAL HEMORRHOIDS WITHOUT COMPLICATION: ICD-10-CM

## 2024-08-06 DIAGNOSIS — K64.8 INTERNAL AND EXTERNAL HEMORRHOIDS WITHOUT COMPLICATION: ICD-10-CM

## 2024-08-06 DIAGNOSIS — J30.9 ALLERGIC RHINITIS, UNSPECIFIED SEASONALITY, UNSPECIFIED TRIGGER: ICD-10-CM

## 2024-08-06 DIAGNOSIS — G47.33 OSA (OBSTRUCTIVE SLEEP APNEA): ICD-10-CM

## 2024-08-06 DIAGNOSIS — M31.0 LEUKOCYTOCLASTIC VASCULITIS (HCC): ICD-10-CM

## 2024-08-06 DIAGNOSIS — E55.9 VITAMIN D DEFICIENCY: ICD-10-CM

## 2024-08-06 DIAGNOSIS — I65.23 BILATERAL CAROTID ARTERY STENOSIS: ICD-10-CM

## 2024-08-06 DIAGNOSIS — H26.9 CATARACT OF BOTH EYES, UNSPECIFIED CATARACT TYPE: ICD-10-CM

## 2024-08-06 DIAGNOSIS — F40.01 AGORAPHOBIA WITH PANIC DISORDER: ICD-10-CM

## 2024-08-06 DIAGNOSIS — M15.0 PRIMARY OSTEOARTHRITIS INVOLVING MULTIPLE JOINTS: ICD-10-CM

## 2024-08-06 DIAGNOSIS — E89.0 S/P PARTIAL THYROIDECTOMY: ICD-10-CM

## 2024-08-06 DIAGNOSIS — Z71.85 VACCINE COUNSELING: ICD-10-CM

## 2024-08-06 DIAGNOSIS — Z79.899 MEDICATION MANAGEMENT: ICD-10-CM

## 2024-08-06 DIAGNOSIS — E11.9 DIABETES MELLITUS WITH NO COMPLICATION (HCC): Primary | ICD-10-CM

## 2024-08-06 DIAGNOSIS — I34.1 MITRAL VALVE PROLAPSE: ICD-10-CM

## 2024-08-06 DIAGNOSIS — K57.30 DIVERTICULOSIS OF LARGE INTESTINE WITHOUT HEMORRHAGE: ICD-10-CM

## 2024-08-06 DIAGNOSIS — E03.9 ACQUIRED HYPOTHYROIDISM: ICD-10-CM

## 2024-08-06 DIAGNOSIS — E04.2 MULTIPLE THYROID NODULES: ICD-10-CM

## 2024-08-06 DIAGNOSIS — E53.8 VITAMIN B12 DEFICIENCY: ICD-10-CM

## 2024-08-06 DIAGNOSIS — K44.9 HIATAL HERNIA: ICD-10-CM

## 2024-08-06 DIAGNOSIS — E78.2 MIXED HYPERLIPIDEMIA: ICD-10-CM

## 2024-08-06 DIAGNOSIS — I10 ESSENTIAL HYPERTENSION: ICD-10-CM

## 2024-08-06 DIAGNOSIS — M81.6 LOCALIZED OSTEOPOROSIS, UNSPECIFIED PATHOLOGICAL FRACTURE PRESENCE: ICD-10-CM

## 2024-08-06 LAB
ALBUMIN SERPL-MCNC: 4.6 G/DL (ref 3.2–4.8)
ALBUMIN/GLOB SERPL: 1.8 {RATIO} (ref 1–2)
ALP LIVER SERPL-CCNC: 83 U/L
ALT SERPL-CCNC: 12 U/L
ANION GAP SERPL CALC-SCNC: 4 MMOL/L (ref 0–18)
AST SERPL-CCNC: 20 U/L (ref ?–34)
BILIRUB SERPL-MCNC: 1 MG/DL (ref 0.2–1.1)
BUN BLD-MCNC: 12 MG/DL (ref 9–23)
CALCIUM BLD-MCNC: 9.9 MG/DL (ref 8.7–10.4)
CHLORIDE SERPL-SCNC: 104 MMOL/L (ref 98–112)
CHOLEST SERPL-MCNC: 146 MG/DL (ref ?–200)
CO2 SERPL-SCNC: 29 MMOL/L (ref 21–32)
CREAT BLD-MCNC: 0.74 MG/DL
CREAT UR-SCNC: 43.2 MG/DL
EGFRCR SERPLBLD CKD-EPI 2021: 81 ML/MIN/1.73M2 (ref 60–?)
EST. AVERAGE GLUCOSE BLD GHB EST-MCNC: 126 MG/DL (ref 68–126)
FASTING PATIENT LIPID ANSWER: YES
FASTING STATUS PATIENT QL REPORTED: YES
GLOBULIN PLAS-MCNC: 2.6 G/DL (ref 2–3.5)
GLUCOSE BLD-MCNC: 93 MG/DL (ref 70–99)
HBA1C MFR BLD: 6 % (ref ?–5.7)
HDLC SERPL-MCNC: 55 MG/DL (ref 40–59)
LDLC SERPL CALC-MCNC: 72 MG/DL (ref ?–100)
MICROALBUMIN UR-MCNC: 0.5 MG/DL
MICROALBUMIN/CREAT 24H UR-RTO: 11.6 UG/MG (ref ?–30)
NONHDLC SERPL-MCNC: 91 MG/DL (ref ?–130)
OSMOLALITY SERPL CALC.SUM OF ELEC: 283 MOSM/KG (ref 275–295)
POTASSIUM SERPL-SCNC: 4.1 MMOL/L (ref 3.5–5.1)
PROT SERPL-MCNC: 7.2 G/DL (ref 5.7–8.2)
SODIUM SERPL-SCNC: 137 MMOL/L (ref 136–145)
TRIGL SERPL-MCNC: 106 MG/DL (ref 30–149)
VLDLC SERPL CALC-MCNC: 16 MG/DL (ref 0–30)

## 2024-08-06 PROCEDURE — 80053 COMPREHEN METABOLIC PANEL: CPT | Performed by: FAMILY MEDICINE

## 2024-08-06 PROCEDURE — 99214 OFFICE O/P EST MOD 30 MIN: CPT | Performed by: FAMILY MEDICINE

## 2024-08-06 PROCEDURE — 83036 HEMOGLOBIN GLYCOSYLATED A1C: CPT | Performed by: FAMILY MEDICINE

## 2024-08-06 PROCEDURE — 82043 UR ALBUMIN QUANTITATIVE: CPT | Performed by: FAMILY MEDICINE

## 2024-08-06 PROCEDURE — 99499 UNLISTED E&M SERVICE: CPT | Performed by: FAMILY MEDICINE

## 2024-08-06 PROCEDURE — 80061 LIPID PANEL: CPT | Performed by: FAMILY MEDICINE

## 2024-08-06 PROCEDURE — 82570 ASSAY OF URINE CREATININE: CPT | Performed by: FAMILY MEDICINE

## 2024-08-06 NOTE — PROGRESS NOTES
Minerva Hameed is a 83 year old female.  HPI:   Pt. Is here for med check.    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  HTN -- stable; not currently on meds  Dyslipidemia -- stable on lipitor; no side effects  GERD -- stable on famotadine -- taking only 20 mg at night as needed and no side effects  Osteoporosis -- off alendronate  Hypothyroid -- stable on levothyroxine  Vitamin d def -- has not taken  MINGO-- uses 2 pillows and doing well  Cataract -- stable  Allergies -- stable  Thyroid nodules -- seeing Dr. Stovall  Hiatal hernia and diverticulosis -- stable  Varicose veins -- stable  Vasculitis -- stable  Goes to [facility] -- eye exam annually 2023  MVP -- last echo 12/2020  Last dental exam 11/2020    Meds reviewed  .   Current Outpatient Medications   Medication Sig Dispense Refill    metFORMIN 500 MG Oral Tab Take 1 tablet (500 mg total) by mouth daily with breakfast. 30 tablet 3    atorvastatin 40 MG Oral Tab Take 1 tablet (40 mg total) by mouth 3 (three) times a week. MONDAY WEDNESDAY AND FRIDAY      valACYclovir 1 G Oral Tab Take 1 tablet (1,000 mg total) by mouth 2 (two) times daily. 10 tablet 0    FAMOTIDINE 20 MG Oral Tab TAKE ONE TABLET BY MOUTH DAILY AS NEEDED 90 tablet 0    LEVOTHYROXINE 25 MCG Oral Tab TAKE ONE TABLET BY MOUTH ONCE DAILY BEFORE BREAKFAST 90 tablet 0    Sodium Chloride-Xylitol (XLEAR SINUS CARE SPRAY NA) by Nasal route. Using as needed         Allergies   Allergen Reactions    Sulfa Antibiotics RASH and OTHER (SEE COMMENTS)     redness    Allergy      Denied Adhesive Tape      Ciprofloxacin RASH    Contrast Dye [Gadolinium Derivatives]      Iodinated Contrast Media      Doxycycline OTHER (SEE COMMENTS)     Abdominal cramps    Lansoprazole PALPITATIONS    Latex RASH    Metoprolol Tartrate UNKNOWN     POWD    Mushrooms     Niacin FACE FLUSHING    Penicillin G RASH    Pravastatin MYALGIA    Simvastatin MYALGIA    Sulfa Drugs Cross Reactors RASH    Valium      Tabs; Sinus  Drainage    Diazepam Runny nose    Peanuts Coughing     vomiting      Past Medical History:    Anxiety state, unspecified    Dizziness and giddiness    Dysfunction of eustachian tube    Dyspepsia and other specified disorders of function of stomach    Dysphagia, unspecified(787.20)    Heartburn    MVP (mitral valve prolapse)    Obstructive sleep apnea (adult) (pediatric)    AHI 40, O2 nagi 78%, Oral applaince with no sig change in the patient's MINGO.  New treatment with positonal tx.     Personal history of pneumonia (recurrent)    walking pneumonia    Screening for malignant neoplasm of the cervix    SUREPATH PAP    Unspecified disorder of thyroid    Unspecified sinusitis (chronic)    Unspecified urinary incontinence      Social History:  Social History     Socioeconomic History    Marital status:    Tobacco Use    Smoking status: Never    Smokeless tobacco: Never   Vaping Use    Vaping status: Never Used   Substance and Sexual Activity    Alcohol use: Yes     Alcohol/week: 0.0 standard drinks of alcohol     Comment: 1-2 drinks/month    Drug use: No   Other Topics Concern    Caffeine Concern No     Comment: none    Exercise No     Comment: active     Social Determinants of Health     Physical Activity: High Risk (10/13/2023)    [facility] [employer] [employer]    Exercise Vital Sign     On average, how many days per week do you engage in moderate to strenuous exercise (like a brisk walk)?: 0 days     On average, how many minutes do you engage in exercise at this level?: 0 min    [facility] from [facility]    Housing Stability        Results for orders placed or performed in visit on 06/25/24   TSH and Free T4   Result Value Ref Range    Free T4 0.9 0.8 - 1.7 ng/dL    TSH 1.700 0.358 - 3.740 mIU/mL       REVIEW OF SYSTEMS:   GENERAL: feels well otherwise  SKIN: denies any unusual skin lesions  EYES:denies blurred vision or double vision;  cataract stable  HEENT: denies nasal congestion, sinus pain or ST  LUNGS: denies shortness of breath with exertion  CARDIOVASCULAR: denies chest pain on exertion  GI: denies abdominal pain,denies heartburn  :  dysuria   MUSCULOSKELETAL: denies back pain  NEURO: denies headaches  PSYCHE: denies depression or anxiety  HEMATOLOGIC: denies hx of anemia  ENDOCRINE: thyroid history  ALL/ASTHMA: denies hx of allergy or asthma    EXAM:   /72 (BP Location: Left arm, Patient Position: Sitting, Cuff Size: adult)   Pulse 85   Resp 16   Ht 5' 0.75\" (1.543 m)   Wt 131 lb 4 oz (59.5 kg)   LMP 01/01/1992   SpO2 96%   BMI 25.00 kg/m²   GENERAL: well developed, well nourished,in no apparent distress  PSYCHE: normal mood and affect  SKIN: no rashes,no suspicious lesions  NECK: supple,no adenopathy,no bruits, thyroid nodules -- see ENT  LUNGS: clear to auscultation  CARDIO: RRR without murmur  GI: good BS's,no masses, HSM or tenderness  EXTREMITIES: no cyanosis, clubbing or edema; arthritis noted in both hands but worst in her thumbs    ASSESSMENT AND PLAN:     Encounter Diagnoses   Name Primary?    Diabetes mellitus with no complication (HCC) Yes    Essential hypertension     Leukocytoclastic vasculitis (HCC)     Mixed hyperlipidemia     Acquired hypothyroidism     S/P partial thyroidectomy     Multiple thyroid nodules     Vitamin D deficiency     Vitamin B12 deficiency     Gastroesophageal reflux disease, unspecified whether esophagitis present     Localized osteoporosis, unspecified pathological fracture presence     MINGO (obstructive sleep apnea)     Allergic rhinitis, unspecified seasonality, unspecified trigger     Varicose veins of both lower extremities, unspecified whether complicated     Agoraphobia with panic disorder     Mitral valve prolapse     Bilateral carotid artery stenosis     Primary osteoarthritis involving multiple joints     Cataract of both eyes, unspecified cataract type     Hiatal hernia      Internal and external hemorrhoids without complication     Diverticulosis of large intestine without hemorrhage     Medication management     Vaccine counseling        Orders Placed This Encounter   Procedures    Comp Metabolic Panel (14)    Lipid Panel    Hemoglobin A1C       Meds & Refills for this Visit:  Requested Prescriptions      No prescriptions requested or ordered in this encounter       Imaging & Consults:  None       HTN -- stable; not currently on meds  Dyslipidemia -- stable on lipitor; no side effects  GERD -- stable on famotadine -- taking only 20 mg at night prn and no side effects  Osteoporosis -- no meds; would like to monitor  Hypothyroid -- stable on levothyroxine; per Dr. Stovall  Vitamin d def -- not taking anything other than MVI  MINGO-- stable on pillows  Cataract -- stable  Allergies -- stable  Thyroid nodules -- Dr. Stovall  Hiatal hernia and diverticulosis/hemorrhoids -- stable  Varicose veins -- stable  Anxiety -- stable  Leukocytoclastic vasculitis-Per dermatology  Vitamin B12 -- advised 1000 mcg daily  Mitral valve prolapse- last echo 12/2020 and stable  Bilateral carotid stenosis --  Stable; per Dr. Abebe  Dexa is UTD.   Mammo scheduled.  Advised shingrix and TdaP through the pharmacy.  Dr. Wiley for her eyes 6/2024       The patient indicates understanding of these issues and agrees to the plan.  The patient is asked to return in Return in about 3 months (around 11/6/2024) for med check.  .

## 2024-08-22 ENCOUNTER — HOSPITAL ENCOUNTER (OUTPATIENT)
Dept: MAMMOGRAPHY | Facility: HOSPITAL | Age: 84
Discharge: HOME | End: 2024-08-22
Attending: FAMILY MEDICINE
Payer: MEDICARE

## 2024-08-22 DIAGNOSIS — Z12.31 ENCOUNTER FOR SCREENING MAMMOGRAM FOR MALIGNANT NEOPLASM OF BREAST: ICD-10-CM

## 2024-08-22 PROCEDURE — 77063 BREAST TOMOSYNTHESIS BI: CPT | Performed by: FAMILY MEDICINE

## 2024-08-22 PROCEDURE — 77067 SCR MAMMO BI INCL CAD: CPT | Performed by: FAMILY MEDICINE

## 2024-08-27 ENCOUNTER — HOSPITAL ENCOUNTER (OUTPATIENT)
Dept: MAMMOGRAPHY | Facility: HOSPITAL | Age: 84
Discharge: HOME | End: 2024-08-27
Attending: FAMILY MEDICINE
Payer: MEDICARE

## 2024-08-27 DIAGNOSIS — R92.2 INCONCLUSIVE MAMMOGRAM: ICD-10-CM

## 2024-08-27 PROCEDURE — 77065 DX MAMMO INCL CAD UNI: CPT | Performed by: FAMILY MEDICINE

## 2024-08-27 PROCEDURE — 77061 BREAST TOMOSYNTHESIS UNI: CPT | Performed by: FAMILY MEDICINE

## 2024-09-23 NOTE — TELEPHONE ENCOUNTER
Last office visit: 08/06/24   Protocol: pass    Requested medication(s) are due for refill today: Yes    Requested medication(s) are on the active medication list same strength, form, dose/ sig: Yes    Requested medication(s) are managed by provider: Yes    Patient has already received a courtsey refill: No    NOV: 11/11/24  Asked to Return: 11/06/24

## 2024-09-27 DIAGNOSIS — E11.9 DIABETES MELLITUS WITH NO COMPLICATION (HCC): ICD-10-CM

## 2024-09-30 NOTE — TELEPHONE ENCOUNTER
Last office visit: 08/06/24   Protocol: pass    Requested medication(s) are due for refill today: Yes    Requested medication(s) are on the active medication list same strength, form, dose/ sig: Yes    Requested medication(s) are managed by provider: Yes    Patient has already received a courtsey refill: No    NOV: 11/11/24  Asked to Return: 11/6/2024

## 2024-10-09 ENCOUNTER — TELEPHONE (OUTPATIENT)
Dept: FAMILY MEDICINE CLINIC | Facility: CLINIC | Age: 84
End: 2024-10-09

## 2024-10-09 NOTE — TELEPHONE ENCOUNTER
Diarrhrea X3 days  Felt better  But it feels like starting again after lunch   Denies flu like sx   No N/V , no abdl pain   No recent abx , new products used  No known trigger, same food      Advised If with abdl pain, fever, nausea and vomiting please go to an urgent care or ER (severe diarrhea, blood in the stool and pain)      Otherwise, can try:  -Probiotics (good bacteria)--available as an OTC supplement OR can just add it to your diet~ organic yogurt, fermented milk, tea, vegetables, pickles etc.   -Avoid caffeine or alcohol--it has a laxative effect  -Food diary--monitor for trigger/intolerance/allergies (lactose, gluten)   -Avoid foods that make you gas--beans, cabbage family, carbonated drinks  -Hydrate with electrolytes   -BRAT diet~banana, rice, apple, toast  Follow up if with persistent symptoms with verbalized understanding     Blaine

## 2024-10-11 NOTE — TELEPHONE ENCOUNTER
Condition update  Pt reports she is doing much better  Diarrhea stopped Thursday and she now feels back to normal

## 2024-10-18 ENCOUNTER — OFFICE VISIT (OUTPATIENT)
Dept: FAMILY MEDICINE CLINIC | Facility: CLINIC | Age: 84
End: 2024-10-18
Payer: MEDICARE

## 2024-10-18 VITALS
HEIGHT: 60.75 IN | DIASTOLIC BLOOD PRESSURE: 70 MMHG | TEMPERATURE: 98 F | HEART RATE: 93 BPM | OXYGEN SATURATION: 97 % | SYSTOLIC BLOOD PRESSURE: 116 MMHG | WEIGHT: 132 LBS | BODY MASS INDEX: 25.24 KG/M2 | RESPIRATION RATE: 16 BRPM

## 2024-10-18 DIAGNOSIS — J01.00 ACUTE NON-RECURRENT MAXILLARY SINUSITIS: Primary | ICD-10-CM

## 2024-10-18 DIAGNOSIS — Z79.899 MEDICATION MANAGEMENT: ICD-10-CM

## 2024-10-18 DIAGNOSIS — L85.3 DRY SKIN DERMATITIS: ICD-10-CM

## 2024-10-18 PROCEDURE — 99214 OFFICE O/P EST MOD 30 MIN: CPT | Performed by: FAMILY MEDICINE

## 2024-10-18 RX ORDER — AZITHROMYCIN 250 MG/1
TABLET, FILM COATED ORAL
Qty: 6 TABLET | Refills: 0 | Status: SHIPPED | OUTPATIENT
Start: 2024-10-18 | End: 2024-10-22

## 2024-10-18 NOTE — PROGRESS NOTES
Minerva Hameed is a 84 year old female.  HPI:   Patient complains of having a cough for the past 2 weeks.  She feels it is about the same.  She was in [place] and did hiking while it was cold outside.  She notes that her hands have been red and dry and irritated.  She denies any itching.  She wants to make sure she did not get in contact with anything.  Current Outpatient Medications   Medication Sig Dispense Refill    azithromycin (ZITHROMAX Z-JESSIKA) 250 MG Oral Tab Take 2 tablets (500 mg total) by mouth daily for 1 day, THEN 1 tablet (250 mg total) daily for 4 days. 6 tablet 0    metFORMIN 500 MG Oral Tab Take 1 tablet (500 mg total) by mouth daily with breakfast. 30 tablet 3    atorvastatin 40 MG Oral Tab Take 1 tablet (40 mg total) by mouth 3 (three) times a week. MONDAY WEDNESDAY AND FRIDAY      valACYclovir 1 G Oral Tab Take 1 tablet (1,000 mg total) by mouth 2 (two) times daily. 10 tablet 0    FAMOTIDINE 20 MG Oral Tab TAKE ONE TABLET BY MOUTH DAILY AS NEEDED 90 tablet 0    LEVOTHYROXINE 25 MCG Oral Tab TAKE ONE TABLET BY MOUTH ONCE DAILY BEFORE BREAKFAST 90 tablet 0    Sodium Chloride-Xylitol (XLEAR SINUS CARE SPRAY NA) by Nasal route. Using as needed        No current facility-administered medications for this visit.      Allergies[1]   Past Medical History:    Anxiety state, unspecified    Dizziness and giddiness    Dysfunction of eustachian tube    Dyspepsia and other specified disorders of function of stomach    Dysphagia, unspecified(787.20)    Heartburn    MVP (mitral valve prolapse)    Obstructive sleep apnea (adult) (pediatric)    AHI 40, O2 nagi 78%, Oral applaince with no sig change in the patient's MINGO.  New treatment with positonal tx.     Personal history of pneumonia (recurrent)    walking pneumonia    Screening for malignant neoplasm of the cervix    SUREPATH PAP    Unspecified disorder of thyroid    Unspecified sinusitis (chronic)    Unspecified urinary incontinence      Social  History:  Social History     Socioeconomic History    Marital status:    Tobacco Use    Smoking status: Never    Smokeless tobacco: Never   Vaping Use    Vaping status: Never Used   Substance and Sexual Activity    Alcohol use: Yes     Alcohol/week: 0.0 standard drinks of alcohol     Comment: 1-2 drinks/month    Drug use: No   Other Topics Concern    Caffeine Concern No     Comment: none    Exercise No     Comment: active     Social Drivers of Health     Physical Activity: High Risk (10/13/2023)    [facility] [employer] [employer] [employer]    Exercise Vital Sign     On average, how many days per week do you engage in moderate to strenuous exercise (like a brisk walk)?: 0 days     On average, how many minutes do you engage in exercise at this level?: 0 min    Received [facility]    Housing Stability        Results for orders placed or performed in visit on 08/06/24   Comp Metabolic Panel (14)    Collection Time: 08/06/24 10:25 AM   Result Value Ref Range    Glucose 93 70 - 99 mg/dL    Sodium 137 136 - 145 mmol/L    Potassium 4.1 3.5 - 5.1 mmol/L    Chloride 104 98 - 112 mmol/L    CO2 29.0 21.0 - 32.0 mmol/L    Anion Gap 4 0 - 18 mmol/L    BUN 12 9 - 23 mg/dL    Creatinine 0.74 0.55 - 1.02 mg/dL    Calcium, Total 9.9 8.7 - 10.4 mg/dL    Calculated Osmolality 283 275 - 295 mOsm/kg    eGFR-Cr 81 >=60 mL/min/1.73m2    AST 20 <34 U/L    ALT 12 10 - 49 U/L    Alkaline Phosphatase 83 55 - 142 U/L    Bilirubin, Total 1.0 0.2 - 1.1 mg/dL    Total Protein 7.2 5.7 - 8.2 g/dL    Albumin 4.6 3.2 - 4.8 g/dL    Globulin  2.6 2.0 - 3.5 g/dL    A/G Ratio 1.8 1.0 - 2.0    Patient Fasting for CMP? Yes    Lipid Panel    Collection Time: 08/06/24 10:25 AM   Result Value Ref Range    Cholesterol, Total 146 <200 mg/dL    HDL Cholesterol 55 40 - 59 mg/dL    Triglycerides 106 30 - 149 mg/dL    LDL Cholesterol 72 <100 mg/dL    VLDL 16 0 - 30 mg/dL    Non HDL Chol 91  <130 mg/dL    Patient Fasting for Lipid? Yes    Hemoglobin A1C    Collection Time: 08/06/24 10:25 AM   Result Value Ref Range    HgbA1C 6.0 (H) <5.7 %    Estimated Average Glucose 126 68 - 126 mg/dL   Microalb/Creat Ratio, Random Urine    Collection Time: 08/06/24 10:25 AM   Result Value Ref Range    Microalbumin, Urine 0.50 mg/dL    Creatinine Ur Random 43.20 mg/dL    Malb/Cre Calc 11.6 <=30.0 ug/mg       REVIEW OF SYSTEMS:   GENERAL: feels well otherwise  SKIN: denies any unusual skin lesions  LUNGS: denies shortness of breath with exertion  CARDIOVASCULAR: denies chest pain on exertion  GI: denies abdominal pain,denies heartburn  MUSCULOSKELETAL: denies back pain  EXTREMITIES:  No pain or numbness    EXAM:   /70 (BP Location: Left arm, Patient Position: Sitting, Cuff Size: adult)   Pulse 93   Temp 97.9 °F (36.6 °C) (Temporal)   Resp 16   Ht 5' 0.75\" (1.543 m)   Wt 132 lb (59.9 kg)   LMP 01/01/1992   SpO2 97%   BMI 25.15 kg/m²   GENERAL: well developed, well nourished,in no apparent distress  SKIN: no rashes,no suspicious lesions; dry skin on her hands and macular, red rash well demarcated on her arms bilaterally where her sleeves ended about 1 inch above her wrist  HEENT: atraumatic, normocephalic; Ears -- TM wnl bilaterally; congestion in left sinus  NECK: supple,no adenopathy,no bruits  LUNGS: clear to auscultation  CARDIO: RRR without murmur  GI: soft, good BS's; no HSM  EXTREMITIES: no cyanosis, clubbing or edema;     ASSESSMENT AND PLAN:     Encounter Diagnoses   Name Primary?    Acute non-recurrent maxillary sinusitis Yes    Dry skin dermatitis     Medication management        No orders of the defined types were placed in this encounter.      Meds & Refills for this Visit:  Requested Prescriptions     Signed Prescriptions Disp Refills    azithromycin (ZITHROMAX Z-JESSIKA) 250 MG Oral Tab 6 tablet 0     Sig: Take 2 tablets (500 mg total) by mouth daily for 1 day, THEN 1 tablet (250 mg total) daily  for 4 days.       Imaging & Consults:  None    Advised abx for sinus infection.  Deferred cough med.  Advised saline rinse.  Advised lotion for her hands.  Monitor.    The patient indicates understanding of these issues and agrees to the plan.  Return if symptoms worsen or fail to improve.         [1]   Allergies  Allergen Reactions    Sulfa Antibiotics RASH and OTHER (SEE COMMENTS)     redness    Allergy      Denied Adhesive Tape      Ciprofloxacin RASH    Contrast Dye [Gadolinium Derivatives]      Iodinated Contrast Media      Doxycycline OTHER (SEE COMMENTS)     Abdominal cramps    Lansoprazole PALPITATIONS    Latex RASH    Metoprolol Tartrate UNKNOWN     POWD    Mushrooms     Niacin FACE FLUSHING    Penicillin G RASH    Pravastatin MYALGIA    Simvastatin MYALGIA    Sulfa Drugs Cross Reactors RASH    Valium      Tabs; Sinus Drainage    Diazepam Runny nose    Peanuts Coughing     vomiting

## 2024-10-28 ENCOUNTER — OFFICE VISIT (OUTPATIENT)
Dept: FAMILY MEDICINE CLINIC | Facility: CLINIC | Age: 84
End: 2024-10-28
Payer: MEDICARE

## 2024-10-28 VITALS
BODY MASS INDEX: 25.63 KG/M2 | DIASTOLIC BLOOD PRESSURE: 70 MMHG | HEIGHT: 60.75 IN | WEIGHT: 134 LBS | RESPIRATION RATE: 18 BRPM | OXYGEN SATURATION: 95 % | SYSTOLIC BLOOD PRESSURE: 120 MMHG | HEART RATE: 84 BPM

## 2024-10-28 DIAGNOSIS — L85.3 DRY SKIN DERMATITIS: Primary | ICD-10-CM

## 2024-10-28 NOTE — PROGRESS NOTES
Subjective:   Minerva Hameed is a 84 year old female who presents for Cough (Pt had a cough for a day and a half, but not just occasionally,but now she noticed the tops of her hands are red and feel hard.)     Was seen on 10/18/24 for cough/sinus infection.   Notes from 10/18/24 OV:   Advised abx for sinus infection. (Given zpak)  Deferred cough med.  Advised saline rinse.  Advised lotion for her hands.  Monitor.  The patient indicates understanding of these issues and agrees to the plan.  Return if symptoms worsen or fail to improve    Reports improved cough. Had been constant. Is not keeping her up.     Has been using a regular lotion at home, feels that it is not helping her dry hands.       History/Other:    Chief Complaint Reviewed and Verified  Nursing Notes Reviewed and   Verified  Tobacco Reviewed  Allergies Reviewed  Medications Reviewed    Problem List Reviewed  Medical History Reviewed  Surgical History   Reviewed  Family History Reviewed         Tobacco:  She has never smoked tobacco.    Current Outpatient Medications   Medication Sig Dispense Refill    metFORMIN 500 MG Oral Tab Take 1 tablet (500 mg total) by mouth daily with breakfast. 30 tablet 3    atorvastatin 40 MG Oral Tab Take 1 tablet (40 mg total) by mouth 3 (three) times a week. MONDAY WEDNESDAY AND FRIDAY      valACYclovir 1 G Oral Tab Take 1 tablet (1,000 mg total) by mouth 2 (two) times daily. 10 tablet 0    FAMOTIDINE 20 MG Oral Tab TAKE ONE TABLET BY MOUTH DAILY AS NEEDED 90 tablet 0    LEVOTHYROXINE 25 MCG Oral Tab TAKE ONE TABLET BY MOUTH ONCE DAILY BEFORE BREAKFAST 90 tablet 0    Sodium Chloride-Xylitol (XLEAR SINUS CARE SPRAY NA) by Nasal route. Using as needed            Review of Systems:  Review of Systems   Constitutional: Negative.    HENT: Negative.     Eyes: Negative.    Respiratory:  Positive for cough. Negative for chest tightness and shortness of breath.         Minimal lingering cough at this time.     Cardiovascular:  Negative for chest pain.   Gastrointestinal: Negative.    Endocrine: Negative.    Genitourinary: Negative.    Musculoskeletal: Negative.    Skin:         Dry skin on tops of both hands on knuckles.    Allergic/Immunologic: Negative.    Neurological: Negative.    Hematological: Negative.    Psychiatric/Behavioral: Negative.         No SOB or chest pain  No N/V/D/C      Objective:   /70 (BP Location: Right arm, Patient Position: Sitting, Cuff Size: adult)   Pulse 84   Resp 18   Ht 5' 0.75\" (1.543 m)   Wt 134 lb (60.8 kg)   LMP 01/01/1992   SpO2 95%   BMI 25.53 kg/m²  Estimated body mass index is 25.53 kg/m² as calculated from the following:    Height as of this encounter: 5' 0.75\" (1.543 m).    Weight as of this encounter: 134 lb (60.8 kg).  Physical Exam  Vitals reviewed.   Constitutional:       General: She is not in acute distress.     Appearance: Normal appearance. She is not ill-appearing, toxic-appearing or diaphoretic.   HENT:      Head: Normocephalic and atraumatic.   Eyes:      General: No scleral icterus.        Right eye: No discharge.         Left eye: No discharge.      Conjunctiva/sclera: Conjunctivae normal.   Cardiovascular:      Rate and Rhythm: Normal rate and regular rhythm.      Pulses: Normal pulses.      Heart sounds: Normal heart sounds. No murmur heard.     No friction rub. No gallop.   Pulmonary:      Effort: Pulmonary effort is normal. No respiratory distress.      Breath sounds: Normal breath sounds. No stridor. No wheezing, rhonchi or rales.   Chest:      Chest wall: No tenderness.   Skin:     General: Skin is warm and dry.      Comments: Dry skin (red and flaky) on knuckles on hands with small cuts from dryness.      Neurological:      General: No focal deficit present.      Mental Status: She is alert and oriented to person, place, and time.   Psychiatric:         Mood and Affect: Mood normal.         Behavior: Behavior normal.         Thought Content:  Thought content normal.         Judgment: Judgment normal.         Assessment & Plan:   1. Dry skin dermatitis (Primary)    Can try aquaphor over-the-counter for moisturizing the skin.     Can alternate between aquaphor and hydrocortisone 1% cream. This is also over-the-counter and is a steroid cream to help with   inflammation, redness, and itching.     You can use each of these daily.     Make sure you're drinking enough water daily.     Keep your appointment with Dr Gomez on 11/11/24.       Return in about 2 weeks (around 11/11/2024).    Nayeli Diaz, DYLAN, 10/28/2024, 11:36 AM

## 2024-10-28 NOTE — PATIENT INSTRUCTIONS
Can try aquaphor over-the-counter for moisturizing the skin.     Can alternate between aquaphor and hydrocortisone 1% cream. This is also over-the-counter and is a steroid cream to help with   inflammation, redness, and itching.     You can use each of these daily.     Make sure you're drinking enough water daily.     Future Appointments   Date Time Provider Department Center   11/11/2024  1:00 PM Elaina Gomez DO [facility]

## 2024-11-11 ENCOUNTER — OFFICE VISIT (OUTPATIENT)
Dept: FAMILY MEDICINE CLINIC | Facility: CLINIC | Age: 84
End: 2024-11-11
Payer: MEDICARE

## 2024-11-11 VITALS
RESPIRATION RATE: 16 BRPM | WEIGHT: 133 LBS | OXYGEN SATURATION: 99 % | HEIGHT: 60 IN | BODY MASS INDEX: 26.11 KG/M2 | HEART RATE: 91 BPM | DIASTOLIC BLOOD PRESSURE: 76 MMHG | SYSTOLIC BLOOD PRESSURE: 130 MMHG

## 2024-11-11 DIAGNOSIS — M81.6 LOCALIZED OSTEOPOROSIS, UNSPECIFIED PATHOLOGICAL FRACTURE PRESENCE: ICD-10-CM

## 2024-11-11 DIAGNOSIS — E55.9 VITAMIN D DEFICIENCY: ICD-10-CM

## 2024-11-11 DIAGNOSIS — M31.0 LEUKOCYTOCLASTIC VASCULITIS (HCC): ICD-10-CM

## 2024-11-11 DIAGNOSIS — K21.9 GASTROESOPHAGEAL REFLUX DISEASE, UNSPECIFIED WHETHER ESOPHAGITIS PRESENT: ICD-10-CM

## 2024-11-11 DIAGNOSIS — L30.9 ECZEMA, UNSPECIFIED TYPE: ICD-10-CM

## 2024-11-11 DIAGNOSIS — E04.2 MULTIPLE THYROID NODULES: ICD-10-CM

## 2024-11-11 DIAGNOSIS — K64.4 INTERNAL AND EXTERNAL HEMORRHOIDS WITHOUT COMPLICATION: ICD-10-CM

## 2024-11-11 DIAGNOSIS — I34.1 MITRAL VALVE PROLAPSE: ICD-10-CM

## 2024-11-11 DIAGNOSIS — Z71.85 VACCINE COUNSELING: ICD-10-CM

## 2024-11-11 DIAGNOSIS — I83.93 VARICOSE VEINS OF BOTH LOWER EXTREMITIES, UNSPECIFIED WHETHER COMPLICATED: ICD-10-CM

## 2024-11-11 DIAGNOSIS — E78.2 MIXED HYPERLIPIDEMIA: ICD-10-CM

## 2024-11-11 DIAGNOSIS — E11.9 DIABETES MELLITUS WITH NO COMPLICATION (HCC): ICD-10-CM

## 2024-11-11 DIAGNOSIS — Z00.00 ENCOUNTER FOR ANNUAL HEALTH EXAMINATION: Primary | ICD-10-CM

## 2024-11-11 DIAGNOSIS — G47.33 OSA (OBSTRUCTIVE SLEEP APNEA): ICD-10-CM

## 2024-11-11 DIAGNOSIS — J30.9 ALLERGIC RHINITIS, UNSPECIFIED SEASONALITY, UNSPECIFIED TRIGGER: ICD-10-CM

## 2024-11-11 DIAGNOSIS — H26.9 CATARACT OF BOTH EYES, UNSPECIFIED CATARACT TYPE: ICD-10-CM

## 2024-11-11 DIAGNOSIS — E03.9 ACQUIRED HYPOTHYROIDISM: ICD-10-CM

## 2024-11-11 DIAGNOSIS — I10 ESSENTIAL HYPERTENSION: ICD-10-CM

## 2024-11-11 DIAGNOSIS — K64.8 INTERNAL AND EXTERNAL HEMORRHOIDS WITHOUT COMPLICATION: ICD-10-CM

## 2024-11-11 DIAGNOSIS — M15.0 PRIMARY OSTEOARTHRITIS INVOLVING MULTIPLE JOINTS: ICD-10-CM

## 2024-11-11 DIAGNOSIS — Z78.0 MENOPAUSE: ICD-10-CM

## 2024-11-11 DIAGNOSIS — K44.9 HIATAL HERNIA: ICD-10-CM

## 2024-11-11 DIAGNOSIS — K57.30 DIVERTICULOSIS OF LARGE INTESTINE WITHOUT HEMORRHAGE: ICD-10-CM

## 2024-11-11 DIAGNOSIS — F40.01 AGORAPHOBIA WITH PANIC DISORDER: ICD-10-CM

## 2024-11-11 DIAGNOSIS — I65.23 BILATERAL CAROTID ARTERY STENOSIS: ICD-10-CM

## 2024-11-11 DIAGNOSIS — Z13.31 DEPRESSION SCREENING: ICD-10-CM

## 2024-11-11 DIAGNOSIS — E53.8 VITAMIN B12 DEFICIENCY: ICD-10-CM

## 2024-11-11 DIAGNOSIS — E89.0 S/P PARTIAL THYROIDECTOMY: ICD-10-CM

## 2024-11-11 DIAGNOSIS — Z79.899 MEDICATION MANAGEMENT: ICD-10-CM

## 2024-11-11 NOTE — PROGRESS NOTES
Subjective:   Minerva Hameed is a 84 year old female who presents for a Subsequent Annual Wellness visit (Pt already had Initial Annual Wellness) and scheduled follow up of multiple significant but stable problems.   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  HTN -- stable; not currently on meds  Dyslipidemia -- stable on lipitor; no side effects  GERD -- stable on famotadine -- taking only 20 mg at night as needed and no side effects  Osteoporosis -- off alendronate  Hypothyroid -- stable on levothyroxine  Vitamin d def -- has not taken  MINGO-- uses 2 pillows and doing well  Cataract -- stable  Allergies -- stable  Thyroid nodules -- seeing Dr. Stovall  Hiatal hernia and diverticulosis -- stable  Varicose veins -- stable  Vasculitis -- stable  Goes to [facility] -- eye exam annually 2023  MVP -- last echo 1/2023  Last dental exam 2019    Meds reviewed    History/Other:   Fall Risk Assessment:   She has been screened for Falls and is low risk.      Cognitive Assessment:   Abnormal  What day of the week is this?: Correct  What month is it?: Correct  What year is it?: Correct  Recall \"Ball\": Correct  Recall \"Flag\": Incorrect  Recall \"Tree\": Correct    Functional Ability/Status:   Minerva Hameed has some abnormal functions as listed below:  She has Vision problems based on screening of functional status. She has problems with Memory based on screening of functional status.       Depression Screening (PHQ):  PHQ-2 SCORE: 0  , done 11/11/2024   If you checked off any problems, how difficult have these problems made it for you to do your work, take care of things at home, or get along with other people?: Not difficult at all    Last [place] Suicide Screening on 11/11/2024 was No Risk.     10 minutes spent screening and counseling for depression    Advanced Directives:   She does have a Living Will but we do NOT have it on file in Epic.    She does have a POA but we do NOT have it on file in  Epic.    Patient has Advance Care Planning documents but we do not have a copy in EMR. Discussed Advanced Care Planning with patient and instructed patient to get our office a copy to be scanned into EMR.      Patient Active Problem List   Diagnosis    Hematuria, unspecified    Vitamin D deficiency    Essential hypertension    Allergic rhinitis    Eczema    Agoraphobia with panic disorder    Diverticulosis of large intestine    Hyperlipemia    Esophageal reflux    Menopause    Acquired hypothyroidism    Hiatal hernia    Dyslipidemia    Osteoporosis    Cataract    MINGO (obstructive sleep apnea)    History of vitamin D deficiency    Internal and external prolapsed hemorrhoids    Varicose veins of both lower extremities    Cystocele, midline    Multiple thyroid nodules    Vitamin B12 deficiency    Asymptomatic bilateral carotid artery stenosis    History of palpitations    Mitral valve prolapse    Varicose veins of bilateral lower extremities with pain    Vasculitis on skin biopsy    Leukocytoclastic vasculitis (HCC)    Diabetes mellitus with no complication (HCC)    S/P partial thyroidectomy     Allergies:  She is allergic to sulfa antibiotics, allergy, ciprofloxacin, contrast dye [gadolinium derivatives], doxycycline, lansoprazole, latex, metoprolol tartrate, mushrooms, niacin, penicillin g, pravastatin, simvastatin, sulfa drugs cross reactors, valium, diazepam, peanut-containing drug products, and peanuts.    Current Medications:  Outpatient Medications Marked as Taking for the 11/11/24 encounter (Office Visit) with Elaina Gomez, DO   Medication Sig    metFORMIN 500 MG Oral Tab Take 1 tablet (500 mg total) by mouth daily with breakfast.    atorvastatin 40 MG Oral Tab Take 1 tablet (40 mg total) by mouth 3 (three) times a week. MONDAY WEDNESDAY AND FRIDAY    valACYclovir 1 G Oral Tab Take 1 tablet (1,000 mg total) by mouth 2 (two) times daily.    FAMOTIDINE 20 MG Oral Tab TAKE ONE TABLET BY MOUTH DAILY AS NEEDED     LEVOTHYROXINE 25 MCG Oral Tab TAKE ONE TABLET BY MOUTH ONCE DAILY BEFORE BREAKFAST    Sodium Chloride-Xylitol (XLEAR SINUS CARE SPRAY NA) by Nasal route. Using as needed        Medical History:  She  has a past medical history of Anxiety state, unspecified (1995 and 1993), Dizziness and giddiness, Dysfunction of eustachian tube, Dyspepsia and other specified disorders of function of stomach, Dysphagia, unspecified(787.20), Heartburn, MVP (mitral valve prolapse), Obstructive sleep apnea (adult) (pediatric) (EDW PS[facility]-9/14/17), Personal history of pneumonia (recurrent), Screening for malignant neoplasm of the cervix (1/27/2012), Unspecified disorder of thyroid, Unspecified sinusitis (chronic), and Unspecified urinary incontinence.  Surgical History:  She  has a past surgical history that includes colonoscopy (5/2/2007); upper gi endoscopy,exam (3/29/2010); nasal surg proc unlisted; other surgical history (7/3/2007); other surgical history (1/8/2010); tonsillectomy (1951); thyroidectomy; gastroduodenostomy (11/22/2013); colonoscopy (N/A, 10/4/2017); yobany localization wire 1 site left (cpt=19281) (1985); and yobany localization wire 1 site left (cpt=19281) (1990).   Family History:  Her family history includes Arthritis in her mother; Cancer in her brother and father; Gastro-Intestinal Disorder in her sister; Heart Attack in her mother and sister; Heart Disease in her sister; Heart Surgery in her sister; Lung Disorder in her father; Other in her mother; Stroke in her mother; other in her maternal grandmother.  Social History:  She  reports that she has never smoked. She has never used smokeless tobacco. She reports current alcohol use. She reports that she does not use drugs.    Tobacco:  She has never smoked tobacco.    CAGE Alcohol Screen:   CAGE screening score of 0 on 11/11/2024, showing low risk of alcohol abuse.      Patient Care Team:  Elaina Gomez DO as PCP - General (Family Practice)  Nir Cardoso MD as  Consulting Physician (GASTROENTEROLOGY)  Stephen Stovall MD as Consulting Physician (OTOLARYNGOLOGY)  Luke Joel MD as Consulting Physician (ENDOCRINOLOGY)  Jesús Carbone MD as Consulting Physician (Cardiovascular Diseases)  Geovanna Wise, PT as Physical Therapist  Chrissy Hinojosa PT as Physical Therapist (Physical Therapy)  Erna Alaniz OT as Occupational Therapist (Occupational Therapist)    Review of Systems  GENERAL: feels well otherwise  SKIN: denies any unusual skin lesions  EYES: denies blurred vision or double vision  HEENT: denies nasal congestion, sinus pain or ST  LUNGS: denies shortness of breath with exertion  CARDIOVASCULAR: denies chest pain on exertion  GI: denies abdominal pain, denies heartburn  : denies dysuria, vaginal discharge or itching, no complaint of urinary incontinence   MUSCULOSKELETAL: denies back pain  NEURO: denies headaches  PSYCHE: denies depression or anxiety  HEMATOLOGIC: denies hx of anemia  ENDOCRINE: denies thyroid history  ALL/ASTHMA: denies hx of allergy or asthma    Objective:   Physical Exam  General Appearance:  Alert, cooperative, no distress, appears stated age   Head:  Normocephalic, without obvious abnormality, atraumatic   Eyes:  Conjunctiva/corneas clear, EOM's intact both eyes   Ears:  Normal TM's and external ear canals, both ears   Nose: Nares normal, septum midline,mucosa normal, no drainage or sinus tenderness   Throat: Lips, mucosa, and tongue normal; teeth and gums normal   Neck: Supple, symmetrical, trachea midline, no adenopathy;  thyroid: not enlarged, symmetric, no tenderness/mass/nodules; no carotid bruit or JVD   Back:   Symmetric, no curvature, ROM normal, no CVA tenderness   Lungs:   Clear to auscultation bilaterally, respirations unlabored   Heart:  Regular rate and rhythm, S1 and S2 normal, no murmur, rub, or gallop   Abdomen:   Soft, non-tender, bowel sounds active all four quadrants,  no masses, no organomegaly    Breast/Pelvic: Deferred   Extremities: Extremities normal, atraumatic, no cyanosis or edema   Pulses: 2+ and symmetric   Skin: Skin color, texture, turgor normal, no rashes or lesions   Lymph nodes: Cervical, supraclavicular nodes normal   Neurologic: Normal       /76   Pulse 91   Resp 16   Ht 5' (1.524 m)   Wt 133 lb (60.3 kg)   LMP 01/01/1992   SpO2 99%   BMI 25.97 kg/m²  Estimated body mass index is 25.97 kg/m² as calculated from the following:    Height as of this encounter: 5' (1.524 m).    Weight as of this encounter: 133 lb (60.3 kg).    Medicare Hearing Assessment:   Hearing Screening    Time taken: 11/11/2024  1:08 PM  Entry User: Kamilah Gavin MA  Screening Method: Finger Rub  Finger Rub Result: Pass               Assessment & Plan:   Minerva Hameed is a 84 year old female who presents for a Medicare Assessment.     1. Encounter for annual health examination (Primary)  2. Depression screening  -     Depression Screening (Medicare, Annual) []  3. Diabetes mellitus with no complication (HCC)  -     Detailed, Mod Complex (08189)  4. Essential hypertension  -     Detailed, Mod Complex (43057)  5. Leukocytoclastic vasculitis (HCC)  -     Detailed, Mod Complex (77444)  6. Mixed hyperlipidemia  -     Detailed, Mod Complex (54477)  7. Acquired hypothyroidism  -     Detailed, Mod Complex (04788)  8. S/P partial thyroidectomy  -     Detailed, Mod Complex (02828)  9. Multiple thyroid nodules  -     Detailed, Mod Complex (11565)  10. Vitamin D deficiency  -     Detailed, Mod Complex (97113)  11. Vitamin B12 deficiency  -     Detailed, Mod Complex (05210)  12. Gastroesophageal reflux disease, unspecified whether esophagitis present  -     Detailed, Mod Complex (83955)  13. Localized osteoporosis, unspecified pathological fracture presence  -     Detailed, Mod Complex (48951)  14. MINGO (obstructive sleep apnea)  -     Detailed, Mod Complex (97859)  15. Allergic rhinitis, unspecified  seasonality, unspecified trigger  -     Detailed, Mod Complex (99214)  16. Varicose veins of both lower extremities, unspecified whether complicated  -     Detailed, Mod Complex (99214)  17. Agoraphobia with panic disorder  -     Detailed, Mod Complex (99214)  18. Mitral valve prolapse  -     Detailed, Mod Complex (99214)  19. Bilateral carotid artery stenosis  -     Detailed, Mod Complex (99214)  20. Primary osteoarthritis involving multiple joints  -     Detailed, Mod Complex (99214)  21. Cataract of both eyes, unspecified cataract type  -     Detailed, Mod Complex (99214)  22. Hiatal hernia  -     Detailed, Mod Complex (99214)  23. Internal and external hemorrhoids without complication  -     Detailed, Mod Complex (99214)  24. Diverticulosis of large intestine without hemorrhage  -     Detailed, Mod Complex (99214)  25. Vaccine counseling  -     Detailed, Mod Complex (99214)  26. Eczema, unspecified type  -     Detailed, Mod Complex (99214)  27. Medication management  -     Detailed, Mod Complex (99214)  28. Menopause  -     Detailed, Mod Complex (99214)  -     XR DEXA BONE DENSITOMETRY (CPT=77080); Future; Expected date: 11/11/2024    The patient indicates understanding of these issues and agrees to the plan.  Reinforced healthy diet, lifestyle, and exercise.      1. Encounter for annual health examination  Done today.    2. Depression screening  Done today.  - Depression Screening (Medicare, Annual) []    3. Diabetes mellitus with no complication (HCC)  Stable; CPM  - Detailed, Mod Complex (99214)    4. Essential hypertension  Stable; CPM  - Detailed, Mod Complex (99214)    5. Leukocytoclastic vasculitis (HCC)  Stable; CPM  - Detailed, Mod Complex (99214)    6. Mixed hyperlipidemia  Stable; CPM  - Detailed, Mod Complex (99214)    7. Acquired hypothyroidism  Stable; CPM  - Detailed, Mod Complex (99214)    8. S/P partial thyroidectomy  Stable; CPM  - Detailed, Mod Complex (99214)    9. Multiple thyroid  nodules  Stable; CPM  - Detailed, Mod Complex (07135)    10. Vitamin D deficiency  Stable; CPM  - Detailed, Mod Complex (83060)    11. Vitamin B12 deficiency  Stable; CPM  - Detailed, Mod Complex (58023)    12. Gastroesophageal reflux disease, unspecified whether esophagitis present  Stable; CPM  - Detailed, Mod Complex (35232)    13. Localized osteoporosis, unspecified pathological fracture presence  Stable; advised dexa; last one in 2020  - Detailed, Mod Complex (37661)    14. MINGO (obstructive sleep apnea)  Stable; CPM  - Detailed, Mod Complex (39141)    15. Allergic rhinitis, unspecified seasonality, unspecified trigger  Stable; CPM  - Detailed, Mod Complex (24244)    16. Varicose veins of both lower extremities, unspecified whether complicated  Stable; CPM  - Detailed, Mod Complex (62375)    17. Agoraphobia with panic disorder  Stable; CPM  - Detailed, Mod Complex (87541)    18. Mitral valve prolapse  Stable; CPM  - Detailed, Mod Complex (93378)    19. Bilateral carotid artery stenosis  Stable; CPM  - Detailed, Mod Complex (86799)    20. Primary osteoarthritis involving multiple joints  Stable; CPM  - Detailed, Mod Complex (74061)    21. Cataract of both eyes, unspecified cataract type  Stable; CPM  - Detailed, Mod Complex (48488)    22. Hiatal hernia  Stable; CPM  - Detailed, Mod Complex (50154)    23. Internal and external hemorrhoids without complication  Stable; CPM  - Detailed, Mod Complex (10923)    24. Diverticulosis of large intestine without hemorrhage  Stable; CPM  - Detailed, Mod Complex (29747)    25. Vaccine counseling  Reviewed.  - Detailed, Mod Complex (55408)    26. Eczema, unspecified type  Stable; CPM  - Detailed, Mod Complex (06357)    27. Medication management  Reviewed.  - Detailed, Mod Complex (63688)    28. Menopause  Dexa ordered.  - Detailed, Mod Complex (61349)  - XR DEXA BONE DENSITOMETRY (CPT=77080); Future        Return in 3 months (on 2/11/2025).     Elaina Gomez DO, 11/11/2024      Supplementary Documentation:   General Health:  In the past six months, have you lost more than 10 pounds without trying?: 2 - No  Has your appetite been poor?: No  Type of Diet: Balanced  How does the patient maintain a good energy level?: Other  How would you describe your daily physical activity?: Moderate  How would you describe your current health state?: Good  How do you maintain positive mental well-being?: Social Interaction;Visiting Family  On a scale of 0 to 10, with 0 being no pain and 10 being severe pain, what is your pain level?: 0 - (None)  In the past six months, have you experienced urine leakage?: 0-No  At any time do you feel concerned for the safety/well-being of yourself and/or your children, in your home or elsewhere?: No  Have you had any immunizations at another office such as Influenza, Hepatitis B, Tetanus, or Pneumococcal?: No    Health Maintenance   Topic Date Due    Zoster Vaccines (2 of 3) 02/08/2016    Annual Physical  09/20/2024    COVID-19 Vaccine (5 - 2023-24 season) 11/18/2024 (Originally 9/1/2024)    Influenza Vaccine (1) 06/30/2025 (Originally 10/1/2024)    Colorectal Cancer Screening  01/01/2100 (Originally 8/8/1940)    Diabetes Care A1C  02/06/2025    Diabetes Care Dilated Eye Exam  05/21/2025    Diabetes Care Foot Exam  08/06/2025    Diabetes Care: GFR  08/06/2025    Diabetes Care: Microalb/Creat Ratio  08/06/2025    DEXA Scan  Completed    Annual Depression Screening  Completed    Fall Risk Screening (Annual)  Completed    Pneumococcal Vaccine: 65+ Years  Completed

## 2024-11-12 ENCOUNTER — LAB ENCOUNTER (OUTPATIENT)
Dept: LAB | Facility: HOSPITAL | Age: 84
End: 2024-11-12
Attending: FAMILY MEDICINE
Payer: MEDICARE

## 2024-11-12 DIAGNOSIS — R79.81 ELEVATED CO2 LEVEL: Primary | ICD-10-CM

## 2024-11-12 DIAGNOSIS — E78.2 MIXED HYPERLIPIDEMIA: ICD-10-CM

## 2024-11-12 DIAGNOSIS — E11.9 DIABETES MELLITUS WITH NO COMPLICATION (HCC): ICD-10-CM

## 2024-11-12 LAB
ALBUMIN SERPL-MCNC: 4.3 G/DL (ref 3.2–4.8)
ALBUMIN/GLOB SERPL: 1.3 {RATIO} (ref 1–2)
ALP LIVER SERPL-CCNC: 91 U/L
ALT SERPL-CCNC: 14 U/L
ANION GAP SERPL CALC-SCNC: 0 MMOL/L (ref 0–18)
AST SERPL-CCNC: 17 U/L (ref ?–34)
BILIRUB SERPL-MCNC: 1.1 MG/DL (ref 0.2–1.1)
BUN BLD-MCNC: 15 MG/DL (ref 9–23)
CALCIUM BLD-MCNC: 10.1 MG/DL (ref 8.7–10.4)
CHLORIDE SERPL-SCNC: 104 MMOL/L (ref 98–112)
CHOLEST SERPL-MCNC: 172 MG/DL (ref ?–200)
CO2 SERPL-SCNC: 35 MMOL/L (ref 21–32)
CREAT BLD-MCNC: 0.72 MG/DL
EGFRCR SERPLBLD CKD-EPI 2021: 83 ML/MIN/1.73M2 (ref 60–?)
EST. AVERAGE GLUCOSE BLD GHB EST-MCNC: 128 MG/DL (ref 68–126)
FASTING PATIENT LIPID ANSWER: YES
FASTING STATUS PATIENT QL REPORTED: YES
GLOBULIN PLAS-MCNC: 3.3 G/DL (ref 2–3.5)
GLUCOSE BLD-MCNC: 92 MG/DL (ref 70–99)
HBA1C MFR BLD: 6.1 % (ref ?–5.7)
HDLC SERPL-MCNC: 63 MG/DL (ref 40–59)
LDLC SERPL CALC-MCNC: 90 MG/DL (ref ?–100)
NONHDLC SERPL-MCNC: 109 MG/DL (ref ?–130)
OSMOLALITY SERPL CALC.SUM OF ELEC: 288 MOSM/KG (ref 275–295)
POTASSIUM SERPL-SCNC: 4.4 MMOL/L (ref 3.5–5.1)
PROT SERPL-MCNC: 7.6 G/DL (ref 5.7–8.2)
SODIUM SERPL-SCNC: 139 MMOL/L (ref 136–145)
TRIGL SERPL-MCNC: 105 MG/DL (ref 30–149)
VLDLC SERPL CALC-MCNC: 17 MG/DL (ref 0–30)

## 2024-11-12 PROCEDURE — 83036 HEMOGLOBIN GLYCOSYLATED A1C: CPT

## 2024-11-12 PROCEDURE — 36415 COLL VENOUS BLD VENIPUNCTURE: CPT

## 2024-11-12 PROCEDURE — 80053 COMPREHEN METABOLIC PANEL: CPT

## 2024-11-12 PROCEDURE — 80061 LIPID PANEL: CPT

## 2025-01-06 ENCOUNTER — TELEPHONE (OUTPATIENT)
Dept: FAMILY MEDICINE CLINIC | Facility: CLINIC | Age: 85
End: 2025-01-06

## 2025-01-06 DIAGNOSIS — A60.9 ANOGENITAL HERPES SIMPLEX VIRUS (HSV) INFECTION: ICD-10-CM

## 2025-01-07 RX ORDER — VALACYCLOVIR HYDROCHLORIDE 1 G/1
1000 TABLET, FILM COATED ORAL 2 TIMES DAILY
Qty: 10 TABLET | Refills: 0 | Status: CANCELLED | OUTPATIENT
Start: 2025-01-07

## 2025-01-16 ENCOUNTER — TELEPHONE (OUTPATIENT)
Dept: CARDIOLOGY | Age: 85
End: 2025-01-16

## 2025-01-17 RX ORDER — ATORVASTATIN CALCIUM 40 MG/1
TABLET, FILM COATED ORAL
Qty: 36 TABLET | Refills: 0 | Status: SHIPPED | OUTPATIENT
Start: 2025-01-17

## 2025-01-21 RX ORDER — ATORVASTATIN CALCIUM 40 MG/1
TABLET, FILM COATED ORAL
Qty: 90 TABLET | Refills: 0 | OUTPATIENT
Start: 2025-01-21

## 2025-03-03 ENCOUNTER — OFFICE VISIT (OUTPATIENT)
Dept: FAMILY MEDICINE CLINIC | Facility: CLINIC | Age: 85
End: 2025-03-03
Payer: MEDICARE

## 2025-03-03 VITALS
HEIGHT: 60 IN | OXYGEN SATURATION: 99 % | BODY MASS INDEX: 26.75 KG/M2 | WEIGHT: 136.25 LBS | HEART RATE: 81 BPM | SYSTOLIC BLOOD PRESSURE: 118 MMHG | DIASTOLIC BLOOD PRESSURE: 72 MMHG | RESPIRATION RATE: 16 BRPM

## 2025-03-03 DIAGNOSIS — M15.0 PRIMARY OSTEOARTHRITIS INVOLVING MULTIPLE JOINTS: ICD-10-CM

## 2025-03-03 DIAGNOSIS — R79.81 ELEVATED CO2 LEVEL: ICD-10-CM

## 2025-03-03 DIAGNOSIS — K64.8 INTERNAL AND EXTERNAL HEMORRHOIDS WITHOUT COMPLICATION: ICD-10-CM

## 2025-03-03 DIAGNOSIS — K21.9 GASTROESOPHAGEAL REFLUX DISEASE, UNSPECIFIED WHETHER ESOPHAGITIS PRESENT: ICD-10-CM

## 2025-03-03 DIAGNOSIS — J30.9 ALLERGIC RHINITIS, UNSPECIFIED SEASONALITY, UNSPECIFIED TRIGGER: ICD-10-CM

## 2025-03-03 DIAGNOSIS — Z71.85 VACCINE COUNSELING: ICD-10-CM

## 2025-03-03 DIAGNOSIS — A60.9 ANOGENITAL HERPES SIMPLEX VIRUS (HSV) INFECTION: ICD-10-CM

## 2025-03-03 DIAGNOSIS — E03.9 ACQUIRED HYPOTHYROIDISM: ICD-10-CM

## 2025-03-03 DIAGNOSIS — E53.8 VITAMIN B12 DEFICIENCY: ICD-10-CM

## 2025-03-03 DIAGNOSIS — I34.1 MITRAL VALVE PROLAPSE: ICD-10-CM

## 2025-03-03 DIAGNOSIS — E04.2 MULTIPLE THYROID NODULES: ICD-10-CM

## 2025-03-03 DIAGNOSIS — I10 DIABETES MELLITUS WITH COINCIDENT HYPERTENSION (HCC): Primary | ICD-10-CM

## 2025-03-03 DIAGNOSIS — G47.33 OSA (OBSTRUCTIVE SLEEP APNEA): ICD-10-CM

## 2025-03-03 DIAGNOSIS — F40.01 AGORAPHOBIA WITH PANIC DISORDER: ICD-10-CM

## 2025-03-03 DIAGNOSIS — K44.9 HIATAL HERNIA: ICD-10-CM

## 2025-03-03 DIAGNOSIS — I65.23 BILATERAL CAROTID ARTERY STENOSIS: ICD-10-CM

## 2025-03-03 DIAGNOSIS — E55.9 VITAMIN D DEFICIENCY: ICD-10-CM

## 2025-03-03 DIAGNOSIS — Z79.899 MEDICATION MANAGEMENT: ICD-10-CM

## 2025-03-03 DIAGNOSIS — E11.9 DIABETES MELLITUS WITH COINCIDENT HYPERTENSION (HCC): Primary | ICD-10-CM

## 2025-03-03 DIAGNOSIS — E78.2 MIXED HYPERLIPIDEMIA: ICD-10-CM

## 2025-03-03 DIAGNOSIS — H26.9 CATARACT OF BOTH EYES, UNSPECIFIED CATARACT TYPE: ICD-10-CM

## 2025-03-03 DIAGNOSIS — M31.0 LEUKOCYTOCLASTIC VASCULITIS (HCC): ICD-10-CM

## 2025-03-03 DIAGNOSIS — I83.93 VARICOSE VEINS OF BOTH LOWER EXTREMITIES, UNSPECIFIED WHETHER COMPLICATED: ICD-10-CM

## 2025-03-03 DIAGNOSIS — M81.6 LOCALIZED OSTEOPOROSIS, UNSPECIFIED PATHOLOGICAL FRACTURE PRESENCE: ICD-10-CM

## 2025-03-03 DIAGNOSIS — K57.30 DIVERTICULOSIS OF LARGE INTESTINE WITHOUT HEMORRHAGE: ICD-10-CM

## 2025-03-03 DIAGNOSIS — I10 ESSENTIAL HYPERTENSION: ICD-10-CM

## 2025-03-03 DIAGNOSIS — E89.0 S/P PARTIAL THYROIDECTOMY: ICD-10-CM

## 2025-03-03 DIAGNOSIS — K64.4 INTERNAL AND EXTERNAL HEMORRHOIDS WITHOUT COMPLICATION: ICD-10-CM

## 2025-03-03 LAB
ANION GAP SERPL CALC-SCNC: 0 MMOL/L (ref 0–18)
BUN BLD-MCNC: 19 MG/DL (ref 9–23)
CALCIUM BLD-MCNC: 9.6 MG/DL (ref 8.7–10.6)
CHLORIDE SERPL-SCNC: 106 MMOL/L (ref 98–112)
CO2 SERPL-SCNC: 32 MMOL/L (ref 21–32)
CREAT BLD-MCNC: 0.81 MG/DL
EGFRCR SERPLBLD CKD-EPI 2021: 72 ML/MIN/1.73M2 (ref 60–?)
FASTING STATUS PATIENT QL REPORTED: NO
GLUCOSE BLD-MCNC: 91 MG/DL (ref 70–99)
OSMOLALITY SERPL CALC.SUM OF ELEC: 288 MOSM/KG (ref 275–295)
POTASSIUM SERPL-SCNC: 4.1 MMOL/L (ref 3.5–5.1)
SODIUM SERPL-SCNC: 138 MMOL/L (ref 136–145)

## 2025-03-03 PROCEDURE — 80048 BASIC METABOLIC PNL TOTAL CA: CPT | Performed by: FAMILY MEDICINE

## 2025-03-03 NOTE — PROGRESS NOTES
Minerva Hameed is a 84 year old female.  HPI:   Pt. Is here for med check.    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  HSV -- stopped valtrex for 1-2 weeks and doing well; would like to stop; stating that HSV did not bother her in the past  HTN -- stable; not currently on meds  Dyslipidemia -- stable on lipitor; no side effects  GERD -- stable on famotadine -- taking only 20 mg at night as needed and no side effects  Osteoporosis -- off alendronate  Hypothyroid -- stable on levothyroxine  Vitamin d def -- has not taken  MINGO-- uses 2 pillows and doing well  Cataract -- stable  Allergies -- stable  Thyroid nodules -- seeing Dr. Stovall  Hiatal hernia and diverticulosis -- stable  Varicose veins -- stable  Vasculitis -- stable  Goes to [facility] -- eye exam annually 12/2024  MVP -- last echo 12/2020  Last dental exam 11/2020    Meds reviewed  .   Current Outpatient Medications   Medication Sig Dispense Refill    hydrocortisone 2.5 % External Cream Apply to the affected areas twice a day until smooth 28 g 3    metFORMIN 500 MG Oral Tab Take 1 tablet (500 mg total) by mouth daily with breakfast. 30 tablet 3    atorvastatin 40 MG Oral Tab Take 1 tablet (40 mg total) by mouth 3 (three) times a week. MONDAY WEDNESDAY AND FRIDAY      FAMOTIDINE 20 MG Oral Tab TAKE ONE TABLET BY MOUTH DAILY AS NEEDED 90 tablet 0    LEVOTHYROXINE 25 MCG Oral Tab TAKE ONE TABLET BY MOUTH ONCE DAILY BEFORE BREAKFAST 90 tablet 0    Sodium Chloride-Xylitol (XLEAR SINUS CARE SPRAY NA) by Nasal route. Using as needed       valACYclovir 1 G Oral Tab Take 1 tablet (1,000 mg total) by mouth 2 (two) times daily. (Patient not taking: Reported on 3/3/2025) 10 tablet 0      Allergies   Allergen Reactions    Sulfa Antibiotics RASH and OTHER (SEE COMMENTS)     redness    Allergy      Denied Adhesive Tape      Ciprofloxacin RASH    Contrast Dye [Gadolinium Derivatives]      Iodinated Contrast Media      Doxycycline OTHER (SEE COMMENTS)      Abdominal cramps    Lansoprazole PALPITATIONS    Latex RASH    Metoprolol Tartrate UNKNOWN     POWD    Mushrooms     Niacin FACE FLUSHING    Penicillin G RASH    Pravastatin MYALGIA    Simvastatin MYALGIA    Sulfa Drugs Cross Reactors RASH    Valium      Tabs; Sinus Drainage    Diazepam Runny nose    Peanut-Containing Drug Products Coughing     vomiting    Peanuts Coughing     vomiting      Past Medical History:    Anxiety state, unspecified    Dizziness and giddiness    Dysfunction of eustachian tube    Dyspepsia and other specified disorders of function of stomach    Dysphagia, unspecified(787.20)    Heartburn    MVP (mitral valve prolapse)    Obstructive sleep apnea (adult) (pediatric)    AHI 40, O2 nagi 78%, Oral applaince with no sig change in the patient's MINGO.  New treatment with positonal tx.     Personal history of pneumonia (recurrent)    walking pneumonia    Screening for malignant neoplasm of the cervix    SUREPATH PAP    Unspecified disorder of thyroid    Unspecified sinusitis (chronic)    Unspecified urinary incontinence      Social History:  Social History     Socioeconomic History    Marital status:    Tobacco Use    Smoking status: Never    Smokeless tobacco: Never   Vaping Use    Vaping status: Never Used   Substance and Sexual Activity    Alcohol use: Yes     Alcohol/week: 0.0 standard drinks of alcohol     Comment: 1-2 drinks/month    Drug use: No   Other Topics Concern    Caffeine Concern No     Comment: none    Exercise No     Comment: active     Social Drivers of Health      Received from [facility]    Housing Stability        Results for orders placed or performed in visit on 11/12/24   Comp Metabolic Panel (14)    Collection Time: 11/12/24  9:58 AM   Result Value Ref Range    Glucose 92 70 - 99 mg/dL    Sodium 139 136 - 145 mmol/L    Potassium 4.4 3.5 - 5.1 mmol/L    Chloride 104 98 - 112 mmol/L    CO2 35.0 (H) 21.0 - 32.0 mmol/L    Anion Gap 0 0 - 18 mmol/L     BUN 15 9 - 23 mg/dL    Creatinine 0.72 0.55 - 1.02 mg/dL    Calcium, Total 10.1 8.7 - 10.4 mg/dL    Calculated Osmolality 288 275 - 295 mOsm/kg    eGFR-Cr 83 >=60 mL/min/1.73m2    AST 17 <34 U/L    ALT 14 10 - 49 U/L    Alkaline Phosphatase 91 55 - 142 U/L    Bilirubin, Total 1.1 0.2 - 1.1 mg/dL    Total Protein 7.6 5.7 - 8.2 g/dL    Albumin 4.3 3.2 - 4.8 g/dL    Globulin  3.3 2.0 - 3.5 g/dL    A/G Ratio 1.3 1.0 - 2.0    Patient Fasting for CMP? Yes    Lipid Panel    Collection Time: 11/12/24  9:58 AM   Result Value Ref Range    Cholesterol, Total 172 <200 mg/dL    HDL Cholesterol 63 (H) 40 - 59 mg/dL    Triglycerides 105 30 - 149 mg/dL    LDL Cholesterol 90 <100 mg/dL    VLDL 17 0 - 30 mg/dL    Non HDL Chol 109 <130 mg/dL    Patient Fasting for Lipid? Yes    Hemoglobin A1C    Collection Time: 11/12/24  9:58 AM   Result Value Ref Range    HgbA1C 6.1 (H) <5.7 %    Estimated Average Glucose 128 (H) 68 - 126 mg/dL       REVIEW OF SYSTEMS:   GENERAL: feels well otherwise  SKIN: denies any unusual skin lesions  EYES:denies blurred vision or double vision; cataract stable  HEENT: denies nasal congestion, sinus pain or ST  LUNGS: denies shortness of breath with exertion  CARDIOVASCULAR: denies chest pain on exertion  GI: denies abdominal pain,denies heartburn  :  dysuria   MUSCULOSKELETAL: denies back pain  NEURO: denies headaches  PSYCHE: denies depression or anxiety  HEMATOLOGIC: denies hx of anemia  ENDOCRINE: thyroid history  ALL/ASTHMA: denies hx of allergy or asthma    EXAM:   /72 (BP Location: Left arm, Patient Position: Sitting, Cuff Size: adult)   Pulse 81   Resp 16   Ht 5' (1.524 m)   Wt 136 lb 4 oz (61.8 kg)   LMP 01/01/1992   SpO2 99%   BMI 26.61 kg/m²   GENERAL: well developed, well nourished,in no apparent distress  PSYCHE: normal mood and affect  SKIN: no rashes,no suspicious lesions  NECK: supple,no adenopathy,no bruits, thyroid nodules -- see ENT  LUNGS: clear to auscultation  CARDIO: RRR  without murmur  GI: good BS's,no masses, HSM or tenderness  EXTREMITIES: no cyanosis, clubbing or edema; arthritis noted in both hands but worst in her thumbs  Bilateral barefoot skin diabetic exam is normal, visualized feet and the appearance is normal.  Bilateral monofilament/sensation of both feet is normal.  Pulsation pedal pulse exam of both lower legs/feet is normal as well.       ASSESSMENT AND PLAN:     Encounter Diagnoses   Name Primary?    Diabetes mellitus with coincident hypertension (HCC) Yes    Essential hypertension     Mixed hyperlipidemia     Acquired hypothyroidism     Vitamin D deficiency     Leukocytoclastic vasculitis (HCC)     Vitamin B12 deficiency     S/P partial thyroidectomy     Multiple thyroid nodules     Gastroesophageal reflux disease, unspecified whether esophagitis present     Localized osteoporosis, unspecified pathological fracture presence     MINGO (obstructive sleep apnea)     Allergic rhinitis, unspecified seasonality, unspecified trigger     Varicose veins of both lower extremities, unspecified whether complicated     Agoraphobia with panic disorder     Mitral valve prolapse     Bilateral carotid artery stenosis     Primary osteoarthritis involving multiple joints     Cataract of both eyes, unspecified cataract type     Hiatal hernia     Internal and external hemorrhoids without complication     Diverticulosis of large intestine without hemorrhage     Anogenital herpes simplex virus (HSV) infection     Vaccine counseling     Medication management        Orders Placed This Encounter   Procedures    CBC With Differential With Platelet    Comp Metabolic Panel (14)    Lipid Panel    Vitamin D, 25-Hydroxy    Hemoglobin A1C    Free T4, (Free Thyroxine)    Assay, Thyroid Stim Hormone    Microalb/Creat Ratio, Random Urine    Vitamin B12    Urinalysis, Routine       Meds & Refills for this Visit:  Requested Prescriptions      No prescriptions requested or ordered in this encounter        Imaging & Consults:  None       HTN -- stable; not currently on meds  Dyslipidemia -- stable on lipitor; no side effects  GERD -- stable on famotadine -- taking only 20 mg at night prn and no side effects  Osteoporosis -- no meds; would like to monitor  Hypothyroid -- stable on levothyroxine; per Dr. Stovall  Vitamin d def -- not taking anything other than MVI  MINGO-- stable on pillows  Cataract -- stable  Allergies -- stable  Thyroid nodules -- Dr. Stovall  Hiatal hernia and diverticulosis/hemorrhoids -- stable  Varicose veins -- stable  Anxiety -- stable  Leukocytoclastic vasculitis-Per dermatology  HSV -- stopped valtrex for 2 weeks and will monitor if needs to resume  Vitamin B12 -- advised 1000 mcg daily  Mitral valve prolapse- last echo 12/2020 and stable  Bilateral carotid stenosis --  Stable; per Dr. Abebe  Dexa ordered.  Arvind MARLEY.  Advised shingrix and TdaP through the pharmacy.  Dr. Wiley for her eyes 12/2024       The patient indicates understanding of these issues and agrees to the plan.  The patient is asked to return in Return in about 3 months (around 6/3/2025) for med check 30.  .

## 2025-04-07 ENCOUNTER — TELEPHONE (OUTPATIENT)
Dept: FAMILY MEDICINE CLINIC | Facility: CLINIC | Age: 85
End: 2025-04-07

## 2025-04-07 NOTE — TELEPHONE ENCOUNTER
Received [employer] message on 4/5/25 about patient noting dimmer vision in right eye. Per patient she states her vision has been declining for a while but noted over last day or two that vision has been a little more blurry in right eye. Denies floaters, curtain over vision, or black spots in vision Denies stroke symptoms such as weakness, numbness, slurred speech. Patient overall states that amount of blurriness is not significant enough to interrupt her day or prevent her from doing her usual routine. Advised patient that if vision worsens or noticing any stroke symptoms to report to ER. Otherwise if stable can f/u with her ophthalmologist as soon as possible. Patient endorses understanding and has no further questions at this time.     Jorge Shepherd MD, 04/07/25, 6:59 PM

## 2025-04-28 DIAGNOSIS — E11.9 DIABETES MELLITUS WITH NO COMPLICATION (HCC): ICD-10-CM

## 2025-04-28 NOTE — TELEPHONE ENCOUNTER
Last office visit: 3/3/25   Protocol: pass  Requested medication(s) are due for refill today: yes  Requested medication(s) are on the active medication list same strength, form, dose/ sig:yes  Requested medication(s) are managed by provider: yes  Patient has already received a courtsey refill: no    NOV: 6/18/25    Asked to Return: 6/3/25

## 2025-05-05 DIAGNOSIS — E11.9 DIABETES MELLITUS WITH NO COMPLICATION (HCC): ICD-10-CM

## 2025-05-05 RX ORDER — ATORVASTATIN CALCIUM 40 MG/1
TABLET, FILM COATED ORAL
Qty: 36 TABLET | Refills: 0 | OUTPATIENT
Start: 2025-05-05

## 2025-05-12 RX ORDER — ATORVASTATIN CALCIUM 40 MG/1
TABLET, FILM COATED ORAL
Qty: 36 TABLET | Refills: 0 | OUTPATIENT
Start: 2025-05-12

## 2025-05-31 DIAGNOSIS — E11.9 DIABETES MELLITUS WITH NO COMPLICATION (HCC): ICD-10-CM

## 2025-06-02 NOTE — TELEPHONE ENCOUNTER
Last office visit: 3/3/25   Protocol: pass  Requested medication(s) are due for refill today: yes  Requested medication(s) are on the active medication list same strength, form, dose/ sig: yes  Requested medication(s) are managed by provider: yes  Patient has already received a courtsey refill: no    NOV: 6/18/25    Asked to Return: 6/3/25

## 2025-06-30 DIAGNOSIS — E11.9 DIABETES MELLITUS WITH NO COMPLICATION (HCC): ICD-10-CM

## 2025-07-01 DIAGNOSIS — E11.9 DIABETES MELLITUS WITH NO COMPLICATION (HCC): ICD-10-CM

## 2025-07-01 NOTE — TELEPHONE ENCOUNTER
Last office visit: 03/03/2025   Protocol: PASS    Requested medication(s) are due for refill today: Yes    Requested medication(s) are on the active medication list same strength, form, dose/ sig: Yes    Requested medication(s) are managed by provider: Yes    Patient has already received a courtsey refill: No    NOV: NONE   Asked to Return: 6/3/2025

## 2025-07-03 DIAGNOSIS — E11.9 DIABETES MELLITUS WITH NO COMPLICATION (HCC): ICD-10-CM

## 2025-07-09 DIAGNOSIS — E11.9 DIABETES MELLITUS WITH NO COMPLICATION (HCC): ICD-10-CM

## 2025-07-09 RX ORDER — ATORVASTATIN CALCIUM 40 MG/1
TABLET, FILM COATED ORAL
Qty: 36 TABLET | Refills: 0 | OUTPATIENT
Start: 2025-07-09

## 2025-07-14 RX ORDER — ATORVASTATIN CALCIUM 40 MG/1
TABLET, FILM COATED ORAL
Qty: 36 TABLET | Refills: 0 | OUTPATIENT
Start: 2025-07-14

## 2025-07-27 DIAGNOSIS — E11.9 DIABETES MELLITUS WITH NO COMPLICATION (HCC): ICD-10-CM

## 2025-07-28 NOTE — TELEPHONE ENCOUNTER
Last office visit: 03/03/2025   Protocol: PASS    Requested medication(s) are due for refill today: Yes    Requested medication(s) are on the active medication list same strength, form, dose/ sig: Yes    Requested medication(s) are managed by provider: Yes    Patient has already received a courtsey refill: No    NOV: NONE   Last Labs: 11/12/2024  Asked to Return: 06/03/2025

## 2025-08-05 DIAGNOSIS — E11.9 DIABETES MELLITUS WITH NO COMPLICATION (HCC): ICD-10-CM

## 2025-08-20 RX ORDER — ATORVASTATIN CALCIUM 40 MG/1
TABLET, FILM COATED ORAL
Qty: 36 TABLET | Refills: 0 | OUTPATIENT
Start: 2025-08-20

## 2025-08-25 RX ORDER — ATORVASTATIN CALCIUM 40 MG/1
TABLET, FILM COATED ORAL
Qty: 36 TABLET | Refills: 0 | OUTPATIENT
Start: 2025-08-25

## (undated) DIAGNOSIS — Z01.818 PRE-OP TESTING: ICD-10-CM

## (undated) DIAGNOSIS — E78.2 MIXED HYPERLIPIDEMIA: Primary | ICD-10-CM

## (undated) DIAGNOSIS — M79.644 BILATERAL THUMB PAIN: Primary | ICD-10-CM

## (undated) DIAGNOSIS — E78.2 MIXED HYPERLIPIDEMIA: ICD-10-CM

## (undated) DIAGNOSIS — Z11.59 ENCOUNTER FOR SCREENING FOR OTHER VIRAL DISEASES: Primary | ICD-10-CM

## (undated) DIAGNOSIS — K21.9 GASTROESOPHAGEAL REFLUX DISEASE, UNSPECIFIED WHETHER ESOPHAGITIS PRESENT: Primary | ICD-10-CM

## (undated) DIAGNOSIS — M79.645 BILATERAL THUMB PAIN: Primary | ICD-10-CM

## (undated) NOTE — MR AVS SNAPSHOT
[facility] [address]  [address]  [phone]               Thank you for choosing us for your health care visit with Amarilis Alcazar MD.  We are glad to serve you and happy to provide you wi I have no further follow-up scheduled with this patient at this time. This patient can see me on an as-needed basis. This patient should return urgently for any problems or complications related to my surgical intervention.        Allergies as of Feb 21, office, you can view your past visit information in NEWLINE SOFTWARE by going to Visits < Visit Summaries. NEWLINE SOFTWARE questions? Call [phone] for help. NEWLINE SOFTWARE is NOT to be used for urgent needs. For medical emergencies, dial 911.            Visit EDWARD-EL

## (undated) NOTE — Clinical Note
2017    Patient: Tracee Seo  : 1940 Visit date: 1/3/2017    Dear  Dr. Divina Jenkins, DO,    Thank you for referring Tracee Seo to my practice. Please find my assessment and plan below.         Assessment   Prolapsed internal hemorrhoids, g

## (undated) NOTE — MR AVS SNAPSHOT
[facility], [address] [phone]               Thank you for choosing us for your health care visit with Manas Cole DO.   We are glad to serve you and happy to provide you with this summary Lipid Panel [E]    Complete by: May 16, 2017 (Approximate)    Assoc Dx:  Dyslipidemia [E78.5]           Comp Metabolic Panel (14) [E]    Complete by:   May 16, 2017 (Approximate)    Assoc Dx:  Essential hypertension [I10]           Referral to  for [facility] If you are confident that your benefit plan will not require a referral or authorization, such as [place] Medicaid, please feel free to schedule your appointment immediately.  However, if you are unsure about the requirements for authorization, please wait Prolapsed internal hemorrhoids, grade 3    Rectocele    Encounter for annual health examination    -  Primary    Screening for colon cancer        Depression screening          Instructions and Information about [address] Abdominal aortic aneurysm screening (once between ages 73-68) IPPE only No results found for this or any previous visit.  Limited to patients who meet one of the following criteria:   • Men who are 73-68 years old and have smoked more than 100 cigarettes i Recommend Annually to at least age 76, and as needed after 76 Mammogram,1 Yr due on 01/05/2018 Please get this Mammogram regularly   Immunizations      Influenza  Covered Annually   Orders placed or performed in visit on 10/27/16  -FLU VACC PRSV FREE INC A An information packet, including necessary form from the [employer] 2 website. http://www. idph.state. [place]/public/books/advin.htm  A link to the Acc[employer]ass.  This site has a lot of good information including definit Take 1 tablet by mouth daily. MyChart     Visit [employer]ongSou[employer]  You can access your MyChart to more actively manage your health care and view more details from this visit by going to [email or web address].   If you've recently had a stay a

## (undated) NOTE — MR AVS SNAPSHOT
[facility]  [address] [address] [phone]               Thank you for choosing us for your health care visit with Cecil Daley MD.  We are glad to serve you and happy to provide you with this summary of you Allergy     Denied Adhesive Tape      Contrast Dye [Gadolinium Derivatives]     Iodinated Contrast Media      Doxycycline Other (See Comments)    Abdominal cramps    Lansoprazole Palpitations    Latex Rash    Metoprolol Tartrate Unknown    POWD    Mushroo For medical emergencies, dial 911.            Visit [facility] online at  [email or web address]

## (undated) NOTE — LETTER
Patient Name: Sun Gay  YOB: 1940          MRN number:  [id number]  Date:  6/27/2022  Referring Physician:  Aarti June    Discharge Summary  Initial Functional Outcome Score 72/100  Final Functional Outcome Score 75/100  Number of Visits Attended 5 in Occupational Therapy    Dear Dr. Nellie Smith,    Discharge Summary  Pt has attended 5 visits in Occupational Therapy. Subjective: \"When it hurts, it only hurts really quick and then it goes away. So, I really haven't been having much trouble. \"    Pain: 0/10      Objective: Crepitus present with ROM to thumb/CMC bilaterally   R=41, L=37  R wrist flexion=71      Assessment: Pain well managed with all exercises in clinic as well as with gardening tasks at home. Fatigue noted with combined /wrist resisted exercises though pt notes improvement in functional endurance of hands. Pt able to complete in-hand reaching with the thumb with little difficulty. Pt admits to limited performance of exercises over the past week and exercises were reviewed. Pt ready for d/c to HEP only at this time. Goals:(to be met in 6 visits)  Pt will be independent and compliant with comprehensive HEP to maintain progress achieved in OT  Pt will increase  of each hand by 5 lb for improved use while opening bottles/jars: met for L  Pt will increase R wrist flexion to 70 deg for improved functional use during self cares: met  Pt will report no pain while opening containers: met  Will continue to upgrade goals PRN     Plan: Discharge pt to HEP only. FOTO: 75/100        Patient/Family/Caregiver was advised of these findings, precautions, and treatment options and has agreed to actively participate in planning and for this course of care. Thank you for your referral. If you have any questions, please contact me at Dept: [phone].     Sincerely,  Electronically signed by therapist: MICHAELA Diaz/L    Physician's certification required:  No  Please co-sign or sign and return this letter via fax as soon as possible to [phone]. I certify the need for these services furnished under this plan of treatment and while under my care. X___________________________________________________ Date____________________    Certification From: 8/97/5721  To:9/25/2022 21st Century Cures Act Notice to Patient: Medical documents like this are made available to patients in the interest of transparency. However, be advised this is a medical document and it is intended as jbif-bg-ldre communication between your medical providers. This medical document may contain abbreviations, assessments, medical data, and results or other terms that are unfamiliar. Medical documents are intended to carry relevant information, facts as evident, and the clinical opinion of the practitioner. As such, this medical document may be written in language that appears blunt or direct. You are encouraged to contact your medical provider and/or [facility] if you have any questions about this medical document.

## (undated) NOTE — Clinical Note
2017    Patient: Emilio Carrizales  : 1940 Visit date: 2017    Dear  Dr. Artie Saldivar DO,    Thank you for referring Emilio Carrizales to my practice. Please find my assessment and plan below.         Assessment   Prolapsed internal hemorrhoids,

## (undated) NOTE — MR AVS SNAPSHOT
[facility] [address]  [phone] [phone]               Thank you for choosing us for your health care visit with Army Omar MD.  We are glad to serve you and happy to provide you wi [address] [phone]   [phone]            Jan 31, 2017  2:15 PM   Exam - Established Patient with Abraham Jacques MD   [facility] [address] [facility] [address]  [phone]   [phone] discharge instructions in Scytlhart by going to Visits < Admission Summaries. If you've been to the Emergency Department or your doctor's office, you can view your past visit information in Scytlhart by going to Visits < Visit Summaries. Angry Citizen questions?

## (undated) NOTE — MR AVS SNAPSHOT
[facility], [address] [address] [phone]               Thank you for choosing us for your health care visit with Kathya Palmer DO.   We are glad to serve you and happy to provide you with this summary Pcn [Penicillins] Rash    Peanuts     vomiting      Pravastatin Myalgia    Simvastatin Myalgia    Sulfa Antibiotics Rash    Sulfa Drugs Cross Reactors Rash    Valium     Tabs; Sinus Drainage                Today's Vital Signs     BP Pulse Temp Height Trina Reyez Visits < Visit Summaries. MyChart questions? Call [phone] for help. Semetrichart is NOT to be used for urgent needs. For medical emergencies, dial 911.            Visit [facility] online at  [email or web address]

## (undated) NOTE — LETTER
18    Patient: Kalani Valle  : 1940 Visit date: 2018    Dear  Dr. Keyla Hernandez, DO,    Thank you for referring Kalani Valle to my practice. Please find my assessment and plan below.       Assessment   Internal and external prolapsed hemor does not recall any significant tearing or an episiotomy with her delivery. Physical exam:  The patient is awake, alert, in no acute distress. Her lungs are clear to auscultation bilaterally.   The patient's heart rate is regular with a midsystolic clic

## (undated) NOTE — LETTER
02/18/19            Kevin Lima (Nurse Practitioner) reviewed your phone call and question from 2/15/19. In reviewing your record, she said with that conversation being over a year ago she is not sure what you might have discussed regarding this.

## (undated) NOTE — Clinical Note
2017    Patient: Krystal Jim  : 1940 Visit date: 2017    Dear  Dr. Melissa Delcid, DO,    Thank you for referring Krystal Jim to my practice. Please find my assessment and plan below.         Assessment   Prolapsed internal hemorrhoids,